# Patient Record
Sex: MALE | Race: WHITE | Employment: OTHER | URBAN - METROPOLITAN AREA
[De-identification: names, ages, dates, MRNs, and addresses within clinical notes are randomized per-mention and may not be internally consistent; named-entity substitution may affect disease eponyms.]

---

## 2018-06-13 ENCOUNTER — HOSPITAL ENCOUNTER (OUTPATIENT)
Dept: PREADMISSION TESTING | Age: 78
Discharge: HOME | End: 2018-06-13
Payer: MEDICARE

## 2018-06-13 VITALS — BODY MASS INDEX: 38.8 KG/M2 | HEIGHT: 70 IN | WEIGHT: 271 LBS

## 2018-06-13 LAB
ANION GAP SERPL CALC-SCNC: 10 MMOL/L (ref 3–18)
ATRIAL RATE: 61 BPM
BUN SERPL-MCNC: 26 MG/DL (ref 7–18)
BUN/CREAT SERPL: 13 (ref 12–20)
CALCIUM SERPL-MCNC: 8.5 MG/DL (ref 8.5–10.1)
CALCULATED P AXIS, ECG09: 59 DEGREES
CALCULATED R AXIS, ECG10: 67 DEGREES
CALCULATED T AXIS, ECG11: 44 DEGREES
CHLORIDE SERPL-SCNC: 110 MMOL/L (ref 100–108)
CO2 SERPL-SCNC: 24 MMOL/L (ref 21–32)
CREAT SERPL-MCNC: 2 MG/DL (ref 0.6–1.3)
DIAGNOSIS, 93000: NORMAL
GLUCOSE SERPL-MCNC: 88 MG/DL (ref 74–99)
HCT VFR BLD AUTO: 37.5 % (ref 36–48)
HGB BLD-MCNC: 12 G/DL (ref 13–16)
P-R INTERVAL, ECG05: 192 MS
POTASSIUM SERPL-SCNC: 4.5 MMOL/L (ref 3.5–5.5)
Q-T INTERVAL, ECG07: 440 MS
QRS DURATION, ECG06: 138 MS
QTC CALCULATION (BEZET), ECG08: 442 MS
SODIUM SERPL-SCNC: 144 MMOL/L (ref 136–145)
VENTRICULAR RATE, ECG03: 61 BPM

## 2018-06-13 PROCEDURE — 93005 ELECTROCARDIOGRAM TRACING: CPT

## 2018-06-13 PROCEDURE — 80048 BASIC METABOLIC PNL TOTAL CA: CPT | Performed by: UROLOGY

## 2018-06-13 PROCEDURE — 85018 HEMOGLOBIN: CPT | Performed by: UROLOGY

## 2018-06-13 RX ORDER — LEVOFLOXACIN 5 MG/ML
500 INJECTION, SOLUTION INTRAVENOUS ONCE
Status: CANCELLED | OUTPATIENT
Start: 2018-06-13 | End: 2018-06-13

## 2018-06-13 RX ORDER — SODIUM CHLORIDE, SODIUM LACTATE, POTASSIUM CHLORIDE, CALCIUM CHLORIDE 600; 310; 30; 20 MG/100ML; MG/100ML; MG/100ML; MG/100ML
125 INJECTION, SOLUTION INTRAVENOUS CONTINUOUS
Status: CANCELLED | OUTPATIENT
Start: 2018-06-13

## 2018-06-20 NOTE — H&P
A    Urology [address], .. [address], [address] [facility]  phone: [phone]  fax: [phone]          Patient: Zunilda Akers  YOB: 1940  Date: 06/05/2018 1:30 PM   Visit Type: Office Visit      Assessment/Plan  # Detail Type Description    1. Assessment Enlarged prostate w/ LUTS (N40.1), Symptomatic. Patient Plan Mr. Rohith Akbar has a hx of urinary difficulties he underwent a Greenlight laser of the prostate in 2015 and had done very well. We use 250,000 jewels at that time and removed a large bladder stone. His sx's have recurred I performed a cysto today he has regowth of some prostate tissue. A uroflow study revealed obstructive pattern with a peak flow of 10 ml per sec and a post void residual of over 200ml. I reviewed options with the pt I recommended he undergo a laser of the residual prostatic tissue and he agreed. All risks including pain, infection, bleeding, need for catheter were discussed. He understands and agrees. 2. Assessment Frequency of micturition (R35.0). 3. Assessment Urgency of urination (R39.15). 4. Assessment Urge incontinence (N39.41). 5. Assessment Nocturia (R35.1). This [de-identified]year old male presents for BPH. History of Present Illness:  1. BPH      Onset was gradual. It occurs intermittently. The problem is improving. Associated symptoms include nocturia (3 times per night), urgency and urinary frequency. Pertinent negatives include chills, constipation, fever, pressure and urinary incontinence. Additional information: Pt underwent Greenlight laser of Prostate 8/20/15 using 250k joules and laser removal 1.7cm bladder stone. He underwent BOTOX 9/15/16 which helped. He is here to undergo cystoscopy and uroflow. Nahomi Erickson                   PAST MEDICAL/SURGICAL HISTORY   (Reviewed, updated)    Disease/disorder Onset Date Management Date Comments   Crohn's disease  colonoscopy with biopsy 06/05/2013 Kidney Stone         Lumbar Fusion       hemorrhoidectomy       Prostate biopsy       excision of rectal polyps       lithotripsy       partial colectomy       Green light PVP 08/20/2015      DIAGNOSTICS HISTORY:  Test Ordered Interpretation Result completed   *Echocardiography Trans Complete  see detail EF 0.55 (55%). 01/30/2017   Echo/MUGA - Ejection Fraction 01/30/2017  EF 0.55 (55%) 02/20/2017     Test Ordered Ordering Comments Modifier   *Echocardiography Trans Complete  Mild aortic stenosis    Echo/MUGA - Ejection Fraction 01/30/2017         PROBLEM LIST:  Problem Description Onset Date   Benign essential hypertension 09/21/2012   Vitamin B deficiency 09/21/2012   Vitamin B deficiency 09/21/2012   Crohn's disease 09/21/2012   Vitamin B deficiency 09/21/2012   Vitamin B deficiency 09/21/2012   Benign prostatic hyperplasia 09/21/2012   Anemia 09/21/2012   Chronic renal impairment 09/21/2012     Medication Reconciliation  Medications reconciled today.   Medication Reviewed  Adherence Medication Name Sig Desc Elsewhere Status   taking as directed Fish Oil 1,000 mg capsule  N Verified   taking as directed iron  mg (45 mg iron) tablet,extended release  N Verified   taking as directed Vitamin D3 1,000 unit tablet one po daily Y Verified   taking as directed magnesium oxide 400 mg tablet take 1 tablet by oral route  every 3 days Y Verified   taking as directed Botox 100 unit injection inject by injection route 100 units of botox in 30ml of presertive free saline N Verified   taking as directed cholestyramine (with sugar) 4 gram oral powder TAKE 1 SCOOP BY ORAL ROUTE EVERY DAY DISSOLVED IN 2 TO 6 OUNCES OF WATER OR NONCARBONATED BEVERAGE N Verified   taking as directed METOPROLOL SUCCINATE ER 25 MG TABLET,EXTENDED RELEASE 24 HR TAKE 1.5 TABLET (37.5MG)  BY ORAL ROUTE  EVERY DAY N Verified   taking as directed DOXAZOSIN 4 MG TABLET TAKE 2 TABLETS EVERY DAY FOR ENLARGED PROSTATE WITH LOWER URINARY TRACT SYMPTOMS N Verified   taking as directed ALLOPURINOL 100 MG TABLET TAKE 1 TABLET BY ORAL ROUTE  EVERY DAY N Verified   taking as directed Clarinex 5 mg tablet take 1 tablet by oral route  every day N Verified   taking as directed CYANOCOBALAMIN (VIT B-12) 1,000 MCG/ML INJECTION SOLUTION INJECT 1 MILLILITER BY INTRAMUSCULAR ROUTE  EVERY 3 WEEKS N Verified     Allergies  Ingredient Reaction Medication Name Comment   NO KNOWN ALLERGIES        (Reviewed, no changes.)  Family History:  (Reviewed, updated)  Relationship Family Member Name  Age at Death Condition Onset Age Cause of Death   Father  Y       Father  Y  Cancer  Y   Mother  Y       Mother  Y  Coronary artery disease  Y         Social History:  (Reviewed, updated)  Tobacco use reviewed. Preferred language is Other/not Defined. MARITAL STATUS/FAMILY/SOCIAL SUPPORT  Currently . Tobacco use status: Occasional cigarette smoker. Smoking status: Former smoker. SMOKING STATUS  Use Status Type Smoking Status Usage Per Day Years Used Total Pack Years   yes Cigarette Former smoker          No passive smoke exposure. ALCOHOL  There is no history of alcohol use. consumed rarely. CAFFEINE  The patient uses caffeine: tea and soda. - 2 cups a day. LIFESTYLE  Sedentary activity level. Never exercises. DIET  high calorie. Restorationist/SPIRITUAL  Patient agrees to transfusion. HOME ENVIRONMENT/SAFETY  The home has smoke detectors. Carbon monoxide detector at home. Uses seat belts. Advance Directives:  STATUS  Last reviewed on:     DIRECTIVES  Transfusion: Patient agrees to transfusion. Review of Systems  System Neg/Pos Details   Constitutional Negative Chills and fever. ENMT Negative Ear infections and sore throat. Eyes Negative Blurred vision, double vision and eye pain. Respiratory Negative Asthma, chronic cough, dyspnea and wheezing. Cardio Negative Chest pain.    GI Negative Constipation, decreased appetite, diarrhea, nausea and vomiting.  Positive Nocturia, Urgency, Urinary frequency.  Negative Pressure and urinary incontinence. Endocrine Negative Cold intolerance, heat intolerance, increased thirst and weight loss. Neuro Negative Headache and tremors. Psych Negative Anxiety and depression. Integumentary Negative Itching skin and rash. MS Negative Back pain and joint pain. Hema/Lymph Negative Easy bleeding. Reproductive Negative Sexual dysfunction. Physical Exam  Exam Findings Details   Constitutional Normal Well developed. Neck Exam Normal Inspection - Normal.   Respiratory Normal Inspection - Normal.   Extremity Normal No edema. Neurological Normal Alert and oriented to person, place and time. Cranial nerves intact. No motor or sensory deficits. Psychiatric Normal Oriented to time, place, person and situation. Appropriate mood and affect. Procedures:  Cystoscopy indication:  Bladder. Patient consent:  Consent was obtained. The procedure and risks were explained in detail. Questions were encouraged and answered. The patient was prepped and draped in the usual sterile fashion. Procedure:  A diagnostic cystourethroscopy was performed using a 16 Danish flexible cystoscope  Anesthesia:  Lidocaine 1%  Lidocaine Jelly 2%  ativan 1ml  Patient position:  Supine. Patient response:  Patient tolerated procedure well. Patient was given instructions. Patient was discharged in stable condition. Findings:  Anterior urethra normal in appearance. Prostatic urethra bilobar prostatic enlargement with the median lobe present. Ureteral orifices normal in appearance. Antibiotics:  No antibiotics given. Impression:  Enlarged prostate w/ LUTS N40.1. Uroflow:  Feeling of incomplete bladder emptying R39.14. Consent was obtained. The procedure and risks were explained in detail. Questions were encouraged and answered. Patient was prepped and draped in the usual fashion. Procedure: Total volume: 291ml. Flow time: 54sec. Peak flow: 10ml. Void time: 82sec. Average flow: 5m/sec. Time to peak: 6sec. Sonographic residual urine: 218mL. Time after void: 2 Minutes. Comments:. Physician: Casandra Light MD. Date: 06/05/2018. Time: 2:01 PM.  Post procedure: Instructions: Jessee Sheffield Medications (added, continued, or stopped today)  Started Medication Directions PRN Status PRN Reason Instruction Stopped   12/15/2017 ALLOPURINOL 100 MG TABLET TAKE 1 TABLET BY ORAL ROUTE  EVERY DAY N      09/14/2016 Botox 100 unit injection inject by injection route 100 units of botox in 30ml of presertive free saline N      11/22/2017 cholestyramine (with sugar) 4 gram oral powder TAKE 1 SCOOP BY ORAL ROUTE EVERY DAY DISSOLVED IN 2 TO 6 OUNCES OF WATER OR NONCARBONATED BEVERAGE N      01/15/2018 Clarinex 5 mg tablet take 1 tablet by oral route  every day N      02/21/2018 CYANOCOBALAMIN (VIT B-12) 1,000 MCG/ML INJECTION SOLUTION INJECT 1 MILLILITER BY INTRAMUSCULAR ROUTE  EVERY 3 WEEKS N      12/15/2017 DOXAZOSIN 4 MG TABLET TAKE 2 TABLETS EVERY DAY FOR ENLARGED PROSTATE WITH LOWER URINARY TRACT SYMPTOMS N      06/09/2015 Fish Oil 1,000 mg capsule  N      12/21/2015 iron  mg (45 mg iron) tablet,extended release  N      06/07/2016 magnesium oxide 400 mg tablet take 1 tablet by oral route  every 3 days N      12/15/2017 METOPROLOL SUCCINATE ER 25 MG TABLET,EXTENDED RELEASE 24 HR TAKE 1.5 TABLET (37.5MG)  BY ORAL ROUTE  EVERY DAY N      02/10/2016 Vitamin D3 1,000 unit tablet one po daily N      Completed by:        Maxx Gonzales  06/09/2018 1:52 PM   Document generated by:  Linda Naranjo 06/09/2018 01:52 PM      -----------------------------------------------------------------------------------------------------------    Electronically signed by Casandra Light MD on 06/10/2018 09:33 PM

## 2018-06-21 ENCOUNTER — ANESTHESIA (OUTPATIENT)
Dept: SURGERY | Age: 78
End: 2018-06-21
Payer: MEDICARE

## 2018-06-21 ENCOUNTER — HOSPITAL ENCOUNTER (OUTPATIENT)
Age: 78
Setting detail: OUTPATIENT SURGERY
Discharge: HOME | End: 2018-06-21
Attending: UROLOGY | Admitting: UROLOGY
Payer: MEDICARE

## 2018-06-21 ENCOUNTER — ANESTHESIA EVENT (OUTPATIENT)
Dept: SURGERY | Age: 78
End: 2018-06-21
Payer: MEDICARE

## 2018-06-21 VITALS
SYSTOLIC BLOOD PRESSURE: 136 MMHG | TEMPERATURE: 98.6 F | DIASTOLIC BLOOD PRESSURE: 75 MMHG | HEART RATE: 68 BPM | RESPIRATION RATE: 16 BRPM | WEIGHT: 270 LBS | OXYGEN SATURATION: 98 % | HEIGHT: 71 IN | BODY MASS INDEX: 37.8 KG/M2

## 2018-06-21 PROCEDURE — 74011250636 HC RX REV CODE- 250/636: Performed by: UROLOGY

## 2018-06-21 PROCEDURE — 76210000006 HC OR PH I REC 0.5 TO 1 HR: Performed by: UROLOGY

## 2018-06-21 PROCEDURE — 77030018846 HC SOL IRR STRL H20 ICUM -A: Performed by: UROLOGY

## 2018-06-21 PROCEDURE — 74011250636 HC RX REV CODE- 250/636

## 2018-06-21 PROCEDURE — 76060000032 HC ANESTHESIA 0.5 TO 1 HR: Performed by: UROLOGY

## 2018-06-21 PROCEDURE — 77030020782 HC GWN BAIR PAWS FLX 3M -B: Performed by: UROLOGY

## 2018-06-21 PROCEDURE — 77030018836 HC SOL IRR NACL ICUM -A: Performed by: UROLOGY

## 2018-06-21 PROCEDURE — 74011000272 HC RX REV CODE- 272: Performed by: UROLOGY

## 2018-06-21 PROCEDURE — 76010000160 HC OR TIME 0.5 TO 1 HR INTENSV-TIER 1: Performed by: UROLOGY

## 2018-06-21 PROCEDURE — 74011250637 HC RX REV CODE- 250/637: Performed by: UROLOGY

## 2018-06-21 PROCEDURE — 74011000250 HC RX REV CODE- 250

## 2018-06-21 PROCEDURE — 76210000021 HC REC RM PH II 0.5 TO 1 HR: Performed by: UROLOGY

## 2018-06-21 PROCEDURE — 77030018831 HC SOL IRR H20 BAXT -A: Performed by: UROLOGY

## 2018-06-21 PROCEDURE — 77030012508 HC MSK AIRWY LMA AMBU -A: Performed by: ANESTHESIOLOGY

## 2018-06-21 PROCEDURE — 77030034696 HC CATH URETH FOL 2W BARD -A: Performed by: UROLOGY

## 2018-06-21 RX ORDER — ONDANSETRON 2 MG/ML
INJECTION INTRAMUSCULAR; INTRAVENOUS AS NEEDED
Status: DISCONTINUED | OUTPATIENT
Start: 2018-06-21 | End: 2018-06-21 | Stop reason: HOSPADM

## 2018-06-21 RX ORDER — INSULIN LISPRO 100 [IU]/ML
INJECTION, SOLUTION INTRAVENOUS; SUBCUTANEOUS ONCE
Status: DISCONTINUED | OUTPATIENT
Start: 2018-06-21 | End: 2018-06-21 | Stop reason: HOSPADM

## 2018-06-21 RX ORDER — PROPOFOL 10 MG/ML
INJECTION, EMULSION INTRAVENOUS AS NEEDED
Status: DISCONTINUED | OUTPATIENT
Start: 2018-06-21 | End: 2018-06-21 | Stop reason: HOSPADM

## 2018-06-21 RX ORDER — LEVOFLOXACIN 5 MG/ML
500 INJECTION, SOLUTION INTRAVENOUS ONCE
Status: COMPLETED | OUTPATIENT
Start: 2018-06-21 | End: 2018-06-21

## 2018-06-21 RX ORDER — SODIUM CHLORIDE 0.9 % (FLUSH) 0.9 %
5-10 SYRINGE (ML) INJECTION AS NEEDED
Status: DISCONTINUED | OUTPATIENT
Start: 2018-06-21 | End: 2018-06-21 | Stop reason: HOSPADM

## 2018-06-21 RX ORDER — ATROPA BELLADONNA AND OPIUM 16.2; 3 MG/1; MG/1
SUPPOSITORY RECTAL AS NEEDED
Status: DISCONTINUED | OUTPATIENT
Start: 2018-06-21 | End: 2018-06-21 | Stop reason: HOSPADM

## 2018-06-21 RX ORDER — DEXTROSE 50 % IN WATER (D50W) INTRAVENOUS SYRINGE
25-50 AS NEEDED
Status: DISCONTINUED | OUTPATIENT
Start: 2018-06-21 | End: 2018-06-21 | Stop reason: HOSPADM

## 2018-06-21 RX ORDER — FUROSEMIDE 10 MG/ML
INJECTION INTRAMUSCULAR; INTRAVENOUS AS NEEDED
Status: DISCONTINUED | OUTPATIENT
Start: 2018-06-21 | End: 2018-06-21 | Stop reason: HOSPADM

## 2018-06-21 RX ORDER — FLUMAZENIL 0.1 MG/ML
0.2 INJECTION INTRAVENOUS
Status: DISCONTINUED | OUTPATIENT
Start: 2018-06-21 | End: 2018-06-21 | Stop reason: HOSPADM

## 2018-06-21 RX ORDER — SODIUM CHLORIDE 0.9 % (FLUSH) 0.9 %
5-10 SYRINGE (ML) INJECTION EVERY 8 HOURS
Status: CANCELLED | OUTPATIENT
Start: 2018-06-21

## 2018-06-21 RX ORDER — FENTANYL CITRATE 50 UG/ML
50 INJECTION, SOLUTION INTRAMUSCULAR; INTRAVENOUS
Status: DISCONTINUED | OUTPATIENT
Start: 2018-06-21 | End: 2018-06-21 | Stop reason: HOSPADM

## 2018-06-21 RX ORDER — LIDOCAINE HYDROCHLORIDE 20 MG/ML
INJECTION, SOLUTION EPIDURAL; INFILTRATION; INTRACAUDAL; PERINEURAL AS NEEDED
Status: DISCONTINUED | OUTPATIENT
Start: 2018-06-21 | End: 2018-06-21 | Stop reason: HOSPADM

## 2018-06-21 RX ORDER — SODIUM CHLORIDE, SODIUM LACTATE, POTASSIUM CHLORIDE, CALCIUM CHLORIDE 600; 310; 30; 20 MG/100ML; MG/100ML; MG/100ML; MG/100ML
1000 INJECTION, SOLUTION INTRAVENOUS CONTINUOUS
Status: DISCONTINUED | OUTPATIENT
Start: 2018-06-21 | End: 2018-06-21 | Stop reason: HOSPADM

## 2018-06-21 RX ORDER — SODIUM CHLORIDE, SODIUM LACTATE, POTASSIUM CHLORIDE, CALCIUM CHLORIDE 600; 310; 30; 20 MG/100ML; MG/100ML; MG/100ML; MG/100ML
125 INJECTION, SOLUTION INTRAVENOUS CONTINUOUS
Status: DISCONTINUED | OUTPATIENT
Start: 2018-06-21 | End: 2018-06-21 | Stop reason: HOSPADM

## 2018-06-21 RX ORDER — FENTANYL CITRATE 50 UG/ML
INJECTION, SOLUTION INTRAMUSCULAR; INTRAVENOUS AS NEEDED
Status: DISCONTINUED | OUTPATIENT
Start: 2018-06-21 | End: 2018-06-21 | Stop reason: HOSPADM

## 2018-06-21 RX ORDER — MAGNESIUM SULFATE 100 %
4 CRYSTALS MISCELLANEOUS AS NEEDED
Status: DISCONTINUED | OUTPATIENT
Start: 2018-06-21 | End: 2018-06-21 | Stop reason: HOSPADM

## 2018-06-21 RX ORDER — SODIUM CHLORIDE 0.9 % (FLUSH) 0.9 %
5-10 SYRINGE (ML) INJECTION AS NEEDED
Status: CANCELLED | OUTPATIENT
Start: 2018-06-21

## 2018-06-21 RX ORDER — NALOXONE HYDROCHLORIDE 0.4 MG/ML
0.1 INJECTION, SOLUTION INTRAMUSCULAR; INTRAVENOUS; SUBCUTANEOUS AS NEEDED
Status: DISCONTINUED | OUTPATIENT
Start: 2018-06-21 | End: 2018-06-21 | Stop reason: HOSPADM

## 2018-06-21 RX ORDER — GLYCOPYRROLATE 0.2 MG/ML
INJECTION INTRAMUSCULAR; INTRAVENOUS AS NEEDED
Status: DISCONTINUED | OUTPATIENT
Start: 2018-06-21 | End: 2018-06-21 | Stop reason: HOSPADM

## 2018-06-21 RX ADMIN — LEVOFLOXACIN 500 MG: 5 INJECTION, SOLUTION INTRAVENOUS at 13:40

## 2018-06-21 RX ADMIN — LIDOCAINE HYDROCHLORIDE 80 MG: 20 INJECTION, SOLUTION EPIDURAL; INFILTRATION; INTRACAUDAL; PERINEURAL at 13:46

## 2018-06-21 RX ADMIN — GLYCOPYRROLATE 0.2 MG: 0.2 INJECTION INTRAMUSCULAR; INTRAVENOUS at 13:37

## 2018-06-21 RX ADMIN — SODIUM CHLORIDE, SODIUM LACTATE, POTASSIUM CHLORIDE, AND CALCIUM CHLORIDE: 600; 310; 30; 20 INJECTION, SOLUTION INTRAVENOUS at 14:10

## 2018-06-21 RX ADMIN — FUROSEMIDE 10 MG: 10 INJECTION INTRAMUSCULAR; INTRAVENOUS at 14:03

## 2018-06-21 RX ADMIN — SODIUM CHLORIDE, SODIUM LACTATE, POTASSIUM CHLORIDE, AND CALCIUM CHLORIDE 125 ML/HR: 600; 310; 30; 20 INJECTION, SOLUTION INTRAVENOUS at 12:38

## 2018-06-21 RX ADMIN — PROPOFOL 200 MG: 10 INJECTION, EMULSION INTRAVENOUS at 13:46

## 2018-06-21 RX ADMIN — FENTANYL CITRATE 100 MCG: 50 INJECTION, SOLUTION INTRAMUSCULAR; INTRAVENOUS at 13:46

## 2018-06-21 RX ADMIN — ONDANSETRON 4 MG: 2 INJECTION INTRAMUSCULAR; INTRAVENOUS at 13:48

## 2018-06-21 NOTE — PERIOP NOTES
Pt left stable with Family . Folder with discharge instruction and prescription given. Arm band shredded. IV access discontinue. No further questions. Given instruction on how to empty alcocer. End of PeriOp care.

## 2018-06-21 NOTE — IP AVS SNAPSHOT
[address] [address] 
[phone] Patient: Perez Grove. MRN: [id number] BPQ:7/92/2006 About your hospitalization You were admitted on:  June 21, 2018 You last received care in the:  [facility] PHASE 2 RECOVERY You were discharged on:  June 21, 2018 Why you were hospitalized Your primary diagnosis was:  Not on File Follow-up Information Follow up With Details Comments Contact Info Talib Ellis MD   [address] [facility] [address] [id number] 
[phone] Lyric Shirley MD Call in 1 day(s)  111 [facility] 107 [address]00 [facility] 
[phone] Discharge Orders None A check rodrigo indicates which time of day the medication should be taken. My Medications CONTINUE taking these medications Instructions Each Dose to Equal  
 Morning Noon Evening Bedtime  
 allopurinol 100 mg tablet Commonly known as:  Almaz Horowitz Your last dose was: Your next dose is: Take 100 mg by mouth nightly. Indications: GOUT  
 100 mg  
    
   
   
   
  
 cholestyramine-aspartame 4 gram packet Commonly known as:  Zane Sicard Your last dose was: Your next dose is: Take 4 g by mouth once. 4 g  
    
   
   
   
  
 cyanocobalamin 1,000 mcg/mL injection Commonly known as:  VITAMIN B12 Your last dose was: Your next dose is:    
   
   
 1,000 mcg by IntraMUSCular route every month. 1000 mcg  
    
   
   
   
  
 desloratadine 5 mg tablet Commonly known as:  CLARINEX Your last dose was: Your next dose is: Take 5 mg by mouth nightly. Indications: ALLERGIC RHINITIS  
 5 mg  
    
   
   
   
  
 doxazosin 4 mg tablet Commonly known as:  CARDURA Your last dose was: Your next dose is: Take 4 mg by mouth nightly. Indications: HYPERTENSION  
 4 mg FISH OIL 1,000 mg Cap Generic drug:  omega-3 fatty acids-vitamin e Your last dose was: Your next dose is: Take 1 Cap by mouth daily. Pt. Instructed to stop herbal medications 7 days prior. 1 Cap Iron 325 mg (65 mg iron) tablet Generic drug:  ferrous sulfate Your last dose was: Your next dose is: Take  by mouth Daily (before breakfast). magnesium oxide 400 mg tablet Commonly known as:  MAG-OX Your last dose was: Your next dose is: Take 400 mg by mouth nightly. 400 mg METOPROLOL SUCCINATE PO Your last dose was: Your next dose is: Take 37.5 mg by mouth every evening. 37.5 mg  
    
   
   
   
  
 VITAMIN D3 1,000 unit Cap Generic drug:  cholecalciferol Your last dose was: Your next dose is: Take 1,000 Units by mouth nightly. Indications: VITAMIN D DEFICIENCY  
 1000 Units Discharge Instructions Arely Bhat. Phoenix Juarez M.D. [place] [address] [address] Office: [phone] Fax:    [phone] PROCEDURE: Procedure(s): 
GREENLIGHT LASER OF PROSTATE RESIDUAL TISSUE **SPEC POP** Notify [facility] IMMEDIATELY if any of the following occur: ? You are unable to urinate. Urgency to urinate is not uncommon. ? You find yourself urinating small frequent amounts associated with severe lower abdominal discomfort. ? Bright red blood with clots in the urine. Some reddish urine is not uncommon and should be treated with increasing the amount of fluids you drink. ? Temperature above 101.5° and / or chills. ? You are nauseous and / or vomiting and you cannot hold down any fluids. ? Your pain is not controlled with the pain medication prescribed. Special Considerations: ? Do not drive for at least 24 hours after the procedure and until you are no longer taking narcotic pain medication and you are able to move and react without hesitation. MEDICATIONS: 
Pain   []  Norco®   []  Percocet® []  Dilaudid®    [x]  Tramadol Antibiotics   []  Cipro   []  Keflex    [x] Levaquin   []  Bactrim DS® Urination   []  Vesicare®   []  Flomax Burning   []  Pyridium®   []  Wolfgang Stalls Nausea   []  Zofran®   []  Phenergan® Miscellaneous   []        
 
[] Prescriptions Written on Chart 
 
[] Prescriptions sent Electronically Our office will call you tomorrow to schedule your first follow-up appointment. Please contact [place] [address] Urology at [phone] or go to the nearest Emergency Department / Urgent Care facility for any other medical questions or concerns. DISCHARGE SUMMARY from Nurse PATIENT INSTRUCTIONS: 
 
 
F-face looks uneven A-arms unable to move or move unevenly S-speech slurred or non-existent T-time-call 911 as soon as signs and symptoms begin-DO NOT go Back to bed or wait to see if you get better-TIME IS BRAIN. Warning Signs of HEART ATTACK Call 911 if you have these symptoms: 
? Chest discomfort. Most heart attacks involve discomfort in the center of the chest that lasts more than a few minutes, or that goes away and comes back. It can feel like uncomfortable pressure, squeezing, fullness, or pain. ? Discomfort in other areas of the upper body. Symptoms can include pain or discomfort in one or both arms, the back, neck, jaw, or stomach. ? Shortness of breath with or without chest discomfort. ? Other signs may include breaking out in a cold sweat, nausea, or lightheadedness. Don't wait more than five minutes to call [address]! Fast action can save your life. Calling 911 is almost always the fastest way to get lifesaving treatment. Emergency Medical Services staff can begin treatment when they arrive  up to an hour sooner than if someone gets to the hospital by car. The discharge information has been reviewed with the patient and caregiver. The patient and caregiver verbalized understanding. Discharge medications reviewed with the patient and caregiver and appropriate educational materials and side effects teaching were provided. ____________________________________ Patient armband removed and shredded_______________________________________________________________________________________________ Introducing [facility] & HEALTH SERVICES! [employer] introduces [facility] medical patient portal. Now you can access parts of your medical record, email your doctor's office, and request medication refills online. 1. In your internet browser, go to [email or web address] 2. Click on the First Time User? Click Here link in the Sign In box. You will see the New Member Sign Up page. 3. Enter your [facility] medical Access Code exactly as it appears below. You will not need to use this code after youve completed the sign-up process. If you do not sign up before the expiration date, you must request a new code. · [facility] medical Access Code: [email or web address] Expires: 9/5/2018  5:02 PM 
 
4. Enter the last four digits of your Social Security Number (xxxx) and Date of Birth (mm/dd/yyyy) as indicated and click Submit. You will be taken to the next sign-up page. 5. Create a [employer] ID. This will be your MyChart login ID and cannot be changed, so think of one that is secure and easy to remember. 6. Create a [facility] medical password. You can change your password at any time. 7. Enter your Password Reset Question and Answer. This can be used at a later time if you forget your password. 8. Enter your e-mail address. You will receive e-mail notification when new information is available in [address] 9. Click Sign Up. You can now view and download portions of your medical record. 10. Click the Download Summary menu link to download a portable copy of your medical information. If you have questions, please visit the Frequently Asked Questions section of the [employer] website. Remember, [employer] is NOT to be used for urgent needs. For medical emergencies, dial 911. Now available from your iPhone and Android! Introducing Gus Otto As a [facility] patient, I wanted to make you aware of our electronic visit tool called Gus Danieljuan manuelyamileth. [facility] 24/7 allows you to connect within minutes with a medical provider 24 hours a day, seven days a week via a mobile device or tablet or logging into a secure website from your computer. You can access Gus Farrfin from anywhere in the [place]. A virtual visit might be right for you when you have a simple condition and feel like you just dont want to get out of bed, or cant get away from work for an appointment, when your regular [facility] provider is not available (evenings, weekends or holidays), or when youre out of town and need minor care. Electronic visits cost only $49 and if the [facility] [employer]/7 provider determines a prescription is needed to treat your condition, one can be electronically transmitted to a nearby pharmacy*. Please take a moment to enroll today if you have not already done so. The enrollment process is free and takes just a few minutes. To enroll, please download the [facility] 24/IQcard nida to your tablet or phone, or visit [email or web address] to enroll on your computer.    
And, as an [address] [place] [address] patient with a [employer] account, the results of your visits will be scanned into your electronic medical record and your primary care provider will be able to view the scanned results. We urge you to continue to see your regular [facility] provider for your ongoing medical care. And while your primary care provider may not be the one available when you seek a Gus Farrfin virtual visit, the peace of mind you get from getting a real diagnosis real time can be priceless. For more information on Gus 2Duchejuan manuelfin, view our Frequently Asked Questions (FAQs) at [email or web address]. Sincerely, 
 
Maura Betancourt MD 
Chief Medical Officer [address] *:  certain medications cannot be prescribed via [employer] Providers Seen During Your Hospitalization Provider Specialty Primary office phone Yomaira Rene MD Urology [phone] Your Primary Care Physician (PCP) Primary Care Physician Office Phone Office Fax Serjio [place] [phone] You are allergic to the following No active allergies Recent Documentation Height Weight BMI Smoking Status 1.803 m 122.5 kg 37.66 kg/m2 Former Smoker Emergency Contacts Name Discharge Info Relation Home Work Mobile Valencia Perez [facility] DISCHARGE CAREGIVER [3] Spouse [3] [phone] Patient Belongings The following personal items are in your possession at time of discharge: 
  Dental Appliances: None         Home Medications: None   Jewelry: None  Clothing: Pants, Shirt, Socks, Footwear, Undergarments (and cane in locker #9)    Other Valuables: None Please provide this summary of care documentation to your next provider. Signatures-by signing, you are acknowledging that this After Visit Summary has been reviewed with you and you have received a copy.   
  
 
  
    
    
 Patient Signature: ____________________________________________________________ Date:  ____________________________________________________________  
  
Claudene Born Provider Signature:  ____________________________________________________________ Date:  ____________________________________________________________

## 2018-06-21 NOTE — INTERVAL H&P NOTE
H&P Update:  Adolfo Maxwell. was seen and examined. History and physical has been reviewed. The patient has been examined.  There have been no significant clinical changes since the completion of the originally dated History and Physical.    Signed By: Shashank Willis MD     June 21, 2018 12:18 PM

## 2018-06-21 NOTE — ANESTHESIA PREPROCEDURE EVALUATION
Anesthetic History   No history of anesthetic complications            Review of Systems / Medical History  Patient summary reviewed, nursing notes reviewed and pertinent labs reviewed    Pulmonary  Within defined limits                 Neuro/Psych   Within defined limits           Cardiovascular    Hypertension: well controlled              Exercise tolerance: >4 METS     GI/Hepatic/Renal  Within defined limits              Endo/Other  Within defined limits           Other Findings            Physical Exam    Airway  Mallampati: II  TM Distance: 4 - 6 cm  Neck ROM: normal range of motion   Mouth opening: Normal     Cardiovascular    Rhythm: regular  Rate: normal         Dental      Comments: Missing lower side teeth   Pulmonary  Breath sounds clear to auscultation               Abdominal  GI exam deferred       Other Findings            Anesthetic Plan    ASA: 2  Anesthesia type: general          Induction: Intravenous  Anesthetic plan and risks discussed with: Patient

## 2018-06-21 NOTE — PERIOP NOTES
TRANSFER - IN REPORT:    Verbal report received from ORN on [facility].  being received from OR for routine post - op      Report consisted of patients Situation, Background, Assessment and   Recommendations(SBAR). Information from the following report(s) SBAR was reviewed with the receiving nurse. Opportunity for questions and clarification was provided. Assessment completed upon patients arrival to unit and care assumed.

## 2018-06-21 NOTE — PERIOP NOTES
Reviewed PTA medication list with patient/caregiver and patient/caregiver denies any additional medications. Patient admits to having a responsible adult care for them for at least 24 hours after surgery.     Dual skin assessment completed by Cinthya Jacobson RN and JORDON  [employer]

## 2018-06-21 NOTE — OP NOTES
OPERATIVE NOTE - GREEN LIGHT    Patient: Tasneem Trevino MRN: [id number]  SSN: [id number]    YOB: 1940  Age: [de-identified] y.o. Sex: male      Date of Procedure:  6/21/2018   Preoperative Diagnosis:  BENIGN PROSTATIC HYPERTROPHY W/OBSTRUCTION  Postoperative Diagnosis:  BENIGN PROSTATIC HYPERTROPHY W/OBSTRUCTION    Procedure:  Procedure(s):  GREENLIGHT LASER OF PROSTATE RESIDUAL TISSUE **SPEC POP**  Surgeon:  Surgeon(s) and Role:     * Eduardo Vivas MD - Primary  Anesthesia:  General   Estimated Blood Loss:  Minimal  Specimens:  * No specimens in log *   Implants:  * No implants in log *    Findings:  Regrowth of prostatic tissue from previous Greenlight laser surgery 1610  Complications:  none    Procedure Details:    After informed consent was obtained, the patient was taken to the operating room, and he underwent laryngeal mask anesthesia in the supine position. He was then prepped and draped in the usual surgical fashion after being placed in the dorsal lithotomy position. A 23-Armenian laser cystoscope was inserted with visual obturator per urethra into the bladder. The ureteral orifices were identified. The verumontanum was identified. Next, the visual obturator was exchanged for a laser guide. The greenlight laser fiber was inserted through the laser guide. Lasering was begun at the bladder neck from 5 o'clock to the 7 o'clock position. Once the bladder neck was taken down, my attention then turned towards the right lobe from the bladder neck towards the verumontanum. The left lobe was taken down in a similar fashion. The remainder of the excess prostatic adenoma was removed with the laser. The bladder was redrained, refilled at low-volume. There was no active bleeding. 10 ml of Lasix was given IV to promote post op diuresis. The procedure ended at this point. [id number] joules were used. Both ureteral orifices and the verumontanum were intact at the end of the procedure. The bladder was filled.  The laser was placed on standby and the cystoscope was removed. A 22-Slovenian, 5 mL two-way Gillis catheter was inserted per urethra into the bladder draining clear urine. Ten mL of sterile water was placed in the balloon and the catheter was connected to a leg bag. The patient was then washed off, dried, and placed in the supine position. He was awakened from anesthesia and then transferred to the post anaesthesia care unit.            Eduardo Vivas MD  6/21/2018  2:15 PM

## 2018-06-21 NOTE — ANESTHESIA POSTPROCEDURE EVALUATION
Post-Anesthesia Evaluation & Assessment    Visit Vitals    /65    Pulse (!) 56    Temp 36.6 °C (97.8 °F)    Resp 16    Ht 5' 11\" (1.803 m)    Wt 122.5 kg (270 lb)    SpO2 97%    BMI 37.66 kg/m2       No untreated/active PONV    Post-operative hydration adequate. Adequate post-operative analgesia per PACU discharge criteria    Mental status & level of consciousness: alert and oriented x 3    Respiratory status: patent unassisted airway     No apparent anesthetic complications requiring additional post-anesthetic care    Patient has met all discharge requirements.             Leah Lesch, MD

## 2018-06-21 NOTE — DISCHARGE INSTRUCTIONS
Gary Lo. Paulina Cordero M.D. [facility]  [address], [address] [address], [address] [facility]  Office: [phone]  Fax:    [phone]    PROCEDURE: Procedure(s):  GREENLIGHT LASER OF PROSTATE RESIDUAL TISSUE **SPEC POP**    Notify [facility] IMMEDIATELY if any of the following occur:     You are unable to urinate. Urgency to urinate is not uncommon.  You find yourself urinating small frequent amounts associated with severe lower abdominal discomfort.  Bright red blood with clots in the urine. Some reddish urine is not uncommon and should be treated with increasing the amount of fluids you drink.  Temperature above 101.5° and / or chills.  You are nauseous and / or vomiting and you cannot hold down any fluids.  Your pain is not controlled with the pain medication prescribed. Special Considerations:      Do not drive for at least 24 hours after the procedure and until you are no longer taking narcotic pain medication and you are able to move and react without hesitation. MEDICATIONS:  Pain   []  Norco®   []  Percocet® []  Dilaudid®    [x]  Tramadol   Antibiotics   []  Cipro   []  Keflex    [x] Levaquin   []  Bactrim DS®       Urination   []  Vesicare®   []  Flomax     Burning   []  Pyridium®   []  UribelTM     Nausea   []  Zofran®   []  Phenergan®     Miscellaneous   []           [] Prescriptions Written on Chart    [] Prescriptions sent Electronically           Our office will call you tomorrow to schedule your first follow-up appointment. Please contact [place] [address] [facility] at [phone] or go to the nearest Emergency Department / Urgent Care facility for any other medical questions or concerns.       DISCHARGE SUMMARY from Nurse    PATIENT INSTRUCTIONS:    After general anesthesia or intravenous sedation, for 24 hours or while taking prescription Narcotics:  · Limit your activities  · Do not drive and operate hazardous machinery  · Do not make important personal or business decisions  · Do  not drink alcoholic beverages  · If you have not urinated within 8 hours after discharge, please contact your surgeon on call. Report the following to your surgeon:  · Excessive pain, swelling, redness or odor of or around the surgical area  · Temperature over 100.5  · Nausea and vomiting lasting longer than 4 hours or if unable to take medications  · Any signs of decreased circulation or nerve impairment to extremity: change in color, persistent  numbness, tingling, coldness or increase pain  · Any questions    What to do at Home:  Recommended activity: Ambulate in house,     If you experience any of the following symptoms Chills, Fever, Nausea & Vomiting, pain that is not relieved by Pain medications, excessive blood in the urine please follow up with Dr. Pushpa Jain. *  Please give a list of your current medications to your Primary Care Provider. *  Please update this list whenever your medications are discontinued, doses are      changed, or new medications (including over-the-counter products) are added. *  Please carry medication information at all times in case of emergency situations. These are general instructions for a healthy lifestyle:    No smoking/ No tobacco products/ Avoid exposure to second hand smoke  Surgeon General's Warning:  Quitting smoking now greatly reduces serious risk to your health. Obesity, smoking, and sedentary lifestyle greatly increases your risk for illness    A healthy diet, regular physical exercise & weight monitoring are important for maintaining a healthy lifestyle    You may be retaining fluid if you have a history of heart failure or if you experience any of the following symptoms:  Weight gain of 3 pounds or more overnight or 5 pounds in a week, increased swelling in our hands or feet or shortness of breath while lying flat in bed.   Please call your doctor as soon as you notice any of these symptoms; do not wait until your next office visit. Recognize signs and symptoms of STROKE:    F-face looks uneven    A-arms unable to move or move unevenly    S-speech slurred or non-existent    T-time-call 911 as soon as signs and symptoms begin-DO NOT go       Back to bed or wait to see if you get better-TIME IS BRAIN. Warning Signs of HEART ATTACK     Call 911 if you have these symptoms:   Chest discomfort. Most heart attacks involve discomfort in the center of the chest that lasts more than a few minutes, or that goes away and comes back. It can feel like uncomfortable pressure, squeezing, fullness, or pain.  Discomfort in other areas of the upper body. Symptoms can include pain or discomfort in one or both arms, the back, neck, jaw, or stomach.  Shortness of breath with or without chest discomfort.  Other signs may include breaking out in a cold sweat, nausea, or lightheadedness. Don't wait more than five minutes to call 911 - MINUTES MATTER! Fast action can save your life. Calling 911 is almost always the fastest way to get lifesaving treatment. Emergency Medical Services staff can begin treatment when they arrive -- up to an hour sooner than if someone gets to the hospital by car. The discharge information has been reviewed with the patient and caregiver. The patient and caregiver verbalized understanding. Discharge medications reviewed with the patient and caregiver and appropriate educational materials and side effects teaching were provided.   ____________________________________  Patient armband removed and shredded_______________________________________________________________________________________________

## 2022-01-01 ENCOUNTER — HOSPICE ADMISSION (OUTPATIENT)
Dept: HOSPICE | Facility: HOSPICE | Age: 82
End: 2022-01-01
Payer: MEDICARE

## 2022-01-01 ENCOUNTER — HOME CARE VISIT (OUTPATIENT)
Dept: SCHEDULING | Facility: HOME HEALTH | Age: 82
End: 2022-01-01
Payer: MEDICARE

## 2022-01-01 VITALS
HEART RATE: 83 BPM | RESPIRATION RATE: 20 BRPM | DIASTOLIC BLOOD PRESSURE: 86 MMHG | TEMPERATURE: 97.2 F | SYSTOLIC BLOOD PRESSURE: 135 MMHG

## 2022-01-01 PROCEDURE — 0651 HSPC ROUTINE HOME CARE

## 2022-01-01 PROCEDURE — G0299 HHS/HOSPICE OF RN EA 15 MIN: HCPCS

## 2022-01-01 PROCEDURE — 3331090004 HSPC SERVICE INTENSITY ADD-ON

## 2022-09-06 ENCOUNTER — APPOINTMENT (OUTPATIENT)
Dept: GENERAL RADIOLOGY | Age: 82
End: 2022-09-06
Attending: EMERGENCY MEDICINE
Payer: MEDICARE

## 2022-09-06 ENCOUNTER — HOSPITAL ENCOUNTER (EMERGENCY)
Age: 82
Discharge: HOME | End: 2022-09-06
Attending: EMERGENCY MEDICINE
Payer: MEDICARE

## 2022-09-06 VITALS
DIASTOLIC BLOOD PRESSURE: 76 MMHG | WEIGHT: 250 LBS | OXYGEN SATURATION: 96 % | HEIGHT: 72 IN | SYSTOLIC BLOOD PRESSURE: 172 MMHG | BODY MASS INDEX: 33.86 KG/M2 | RESPIRATION RATE: 13 BRPM | HEART RATE: 74 BPM | TEMPERATURE: 98.5 F

## 2022-09-06 DIAGNOSIS — N18.30 STAGE 3 CHRONIC KIDNEY DISEASE, UNSPECIFIED WHETHER STAGE 3A OR 3B CKD (HCC): ICD-10-CM

## 2022-09-06 DIAGNOSIS — R60.0 PEDAL EDEMA: Primary | ICD-10-CM

## 2022-09-06 DIAGNOSIS — J81.0 ACUTE PULMONARY EDEMA (HCC): ICD-10-CM

## 2022-09-06 LAB
ALBUMIN SERPL-MCNC: 2.7 G/DL (ref 3.4–5)
ALBUMIN/GLOB SERPL: 0.7 {RATIO} (ref 0.8–1.7)
ALP SERPL-CCNC: 88 U/L (ref 45–117)
ALT SERPL-CCNC: 25 U/L (ref 16–61)
ANION GAP SERPL CALC-SCNC: 6 MMOL/L (ref 3–18)
APPEARANCE UR: CLEAR
APTT PPP: 40.5 SEC (ref 23–36.4)
AST SERPL-CCNC: 22 U/L (ref 10–38)
ATRIAL RATE: 73 BPM
BACTERIA URNS QL MICRO: ABNORMAL /HPF
BASOPHILS # BLD: 0 K/UL (ref 0–0.1)
BASOPHILS NFR BLD: 1 % (ref 0–2)
BILIRUB SERPL-MCNC: 1 MG/DL (ref 0.2–1)
BILIRUB UR QL: NEGATIVE
BNP SERPL-MCNC: 1311 PG/ML (ref 0–1800)
BUN SERPL-MCNC: 28 MG/DL (ref 7–18)
BUN/CREAT SERPL: 14 (ref 12–20)
CALCIUM SERPL-MCNC: 8.6 MG/DL (ref 8.5–10.1)
CALCULATED R AXIS, ECG10: 34 DEGREES
CALCULATED T AXIS, ECG11: 6 DEGREES
CHLORIDE SERPL-SCNC: 116 MMOL/L (ref 100–111)
CO2 SERPL-SCNC: 22 MMOL/L (ref 21–32)
COLOR UR: YELLOW
CREAT SERPL-MCNC: 2.02 MG/DL (ref 0.6–1.3)
DIAGNOSIS, 93000: NORMAL
DIFFERENTIAL METHOD BLD: ABNORMAL
EOSINOPHIL # BLD: 0.1 K/UL (ref 0–0.4)
EOSINOPHIL NFR BLD: 5 % (ref 0–5)
EPITH CASTS URNS QL MICRO: ABNORMAL /LPF (ref 0–5)
ERYTHROCYTE [DISTWIDTH] IN BLOOD BY AUTOMATED COUNT: 12.7 % (ref 11.6–14.5)
GLOBULIN SER CALC-MCNC: 3.9 G/DL (ref 2–4)
GLUCOSE SERPL-MCNC: 109 MG/DL (ref 74–99)
GLUCOSE UR STRIP.AUTO-MCNC: NEGATIVE MG/DL
HCT VFR BLD AUTO: 34.3 % (ref 36–48)
HGB BLD-MCNC: 10.8 G/DL (ref 13–16)
HGB UR QL STRIP: ABNORMAL
IMM GRANULOCYTES # BLD AUTO: 0 K/UL (ref 0–0.04)
IMM GRANULOCYTES NFR BLD AUTO: 0 % (ref 0–0.5)
INR PPP: 1.5 (ref 0.8–1.2)
KETONES UR QL STRIP.AUTO: NEGATIVE MG/DL
LEUKOCYTE ESTERASE UR QL STRIP.AUTO: ABNORMAL
LYMPHOCYTES # BLD: 0.5 K/UL (ref 0.9–3.6)
LYMPHOCYTES NFR BLD: 21 % (ref 21–52)
MCH RBC QN AUTO: 33.5 PG (ref 24–34)
MCHC RBC AUTO-ENTMCNC: 31.5 G/DL (ref 31–37)
MCV RBC AUTO: 106.5 FL (ref 78–100)
MONOCYTES # BLD: 0.2 K/UL (ref 0.05–1.2)
MONOCYTES NFR BLD: 9 % (ref 3–10)
NEUTS SEG # BLD: 1.4 K/UL (ref 1.8–8)
NEUTS SEG NFR BLD: 64 % (ref 40–73)
NITRITE UR QL STRIP.AUTO: NEGATIVE
NRBC # BLD: 0 K/UL (ref 0–0.01)
NRBC BLD-RTO: 0 PER 100 WBC
PH UR STRIP: 5.5 [PH] (ref 5–8)
PLATELET # BLD AUTO: 48 K/UL (ref 135–420)
PLATELET COMMENTS,PCOM: ABNORMAL
PMV BLD AUTO: 11.5 FL (ref 9.2–11.8)
POTASSIUM SERPL-SCNC: 3.9 MMOL/L (ref 3.5–5.5)
PROT SERPL-MCNC: 6.6 G/DL (ref 6.4–8.2)
PROT UR STRIP-MCNC: NEGATIVE MG/DL
PROTHROMBIN TIME: 18.4 SEC (ref 11.5–15.2)
Q-T INTERVAL, ECG07: 450 MS
QRS DURATION, ECG06: 138 MS
QTC CALCULATION (BEZET), ECG08: 492 MS
RBC # BLD AUTO: 3.22 M/UL (ref 4.35–5.65)
RBC #/AREA URNS HPF: ABNORMAL /HPF (ref 0–5)
RBC MORPH BLD: ABNORMAL
SODIUM SERPL-SCNC: 144 MMOL/L (ref 136–145)
SP GR UR REFRACTOMETRY: 1.01 (ref 1–1.03)
TROPONIN-HIGH SENSITIVITY: 23 NG/L (ref 0–78)
UROBILINOGEN UR QL STRIP.AUTO: 0.2 EU/DL (ref 0.2–1)
VENTRICULAR RATE, ECG03: 72 BPM
WBC # BLD AUTO: 2.2 K/UL (ref 4.6–13.2)
WBC URNS QL MICRO: ABNORMAL /HPF (ref 0–5)

## 2022-09-06 PROCEDURE — 93005 ELECTROCARDIOGRAM TRACING: CPT

## 2022-09-06 PROCEDURE — 85610 PROTHROMBIN TIME: CPT

## 2022-09-06 PROCEDURE — 99285 EMERGENCY DEPT VISIT HI MDM: CPT

## 2022-09-06 PROCEDURE — 96365 THER/PROPH/DIAG IV INF INIT: CPT

## 2022-09-06 PROCEDURE — 80053 COMPREHEN METABOLIC PANEL: CPT

## 2022-09-06 PROCEDURE — 74011250636 HC RX REV CODE- 250/636: Performed by: EMERGENCY MEDICINE

## 2022-09-06 PROCEDURE — 81001 URINALYSIS AUTO W/SCOPE: CPT

## 2022-09-06 PROCEDURE — P9047 ALBUMIN (HUMAN), 25%, 50ML: HCPCS | Performed by: EMERGENCY MEDICINE

## 2022-09-06 PROCEDURE — 94762 N-INVAS EAR/PLS OXIMTRY CONT: CPT

## 2022-09-06 PROCEDURE — 85025 COMPLETE CBC W/AUTO DIFF WBC: CPT

## 2022-09-06 PROCEDURE — 83880 ASSAY OF NATRIURETIC PEPTIDE: CPT

## 2022-09-06 PROCEDURE — 84484 ASSAY OF TROPONIN QUANT: CPT

## 2022-09-06 PROCEDURE — 71046 X-RAY EXAM CHEST 2 VIEWS: CPT

## 2022-09-06 PROCEDURE — 85730 THROMBOPLASTIN TIME PARTIAL: CPT

## 2022-09-06 PROCEDURE — 74011250637 HC RX REV CODE- 250/637: Performed by: EMERGENCY MEDICINE

## 2022-09-06 PROCEDURE — 96375 TX/PRO/DX INJ NEW DRUG ADDON: CPT

## 2022-09-06 RX ORDER — FUROSEMIDE 10 MG/ML
80 INJECTION INTRAMUSCULAR; INTRAVENOUS
Status: COMPLETED | OUTPATIENT
Start: 2022-09-06 | End: 2022-09-06

## 2022-09-06 RX ORDER — TAMSULOSIN HYDROCHLORIDE 0.4 MG/1
0.4 CAPSULE ORAL
Status: COMPLETED | OUTPATIENT
Start: 2022-09-06 | End: 2022-09-06

## 2022-09-06 RX ORDER — BUMETANIDE 1 MG/1
2 TABLET ORAL 2 TIMES DAILY
Qty: 120 TABLET | Refills: 0 | Status: ON HOLD | OUTPATIENT
Start: 2022-09-06 | End: 2022-10-07

## 2022-09-06 RX ORDER — DOXAZOSIN 4 MG/1
4 TABLET ORAL
Qty: 20 TABLET | Refills: 0 | Status: SHIPPED | OUTPATIENT
Start: 2022-09-06

## 2022-09-06 RX ORDER — BUMETANIDE 1 MG/1
1 TABLET ORAL 2 TIMES DAILY
COMMUNITY
End: 2022-09-06

## 2022-09-06 RX ORDER — ALBUMIN HUMAN 250 G/1000ML
12.5 SOLUTION INTRAVENOUS ONCE
Status: COMPLETED | OUTPATIENT
Start: 2022-09-06 | End: 2022-09-06

## 2022-09-06 RX ADMIN — TAMSULOSIN HYDROCHLORIDE 0.4 MG: 0.4 CAPSULE ORAL at 21:26

## 2022-09-06 RX ADMIN — FUROSEMIDE 80 MG: 10 INJECTION, SOLUTION INTRAMUSCULAR; INTRAVENOUS at 19:43

## 2022-09-06 RX ADMIN — ALBUMIN (HUMAN) 12.5 G: 0.25 INJECTION, SOLUTION INTRAVENOUS at 21:25

## 2022-09-10 NOTE — ED PROVIDER NOTES
EMERGENCY DEPARTMENT HISTORY AND PHYSICAL EXAM    Date: 9/6/2022  Patient Name: Elma Torre. History of Presenting Illness     Chief Complaint   Patient presents with    Foot Swelling           Abdominal Pain         History Provided By: Patient    Additional History (Context):   9:15 PM  Elma Figueroa is a 78 y.o. male with PMHX of hypertension, Crohn's disease with chronic diarrhea, BPH, CKD 3, partial bowel resection who presents to the emergency department C/O of swelling to his feet and body. Patient denies any pain but has noticed a swelling to his findings. He has some dyspnea with exertion but this is extremely is worsening. He has no headache no chest pain or abdominal pain. H she e denies any changes in urinary output or urine cultures. Social History  Denies smoking drinking or drugs. She quit smoking many years ago. Family History  Positive for high blood pressure. PCP: Alicia Song MD    Current Outpatient Medications   Medication Sig Dispense Refill    bumetanide (BUMEX) 1 mg tablet Take 2 Tablets by mouth two (2) times a day for 30 days. 120 Tablet 0    doxazosin (CARDURA) 4 mg tablet Take 1 Tablet by mouth nightly. Indications: enlarged prostate with urination problem, high blood pressure 20 Tablet 0    allopurinol (ZYLOPRIM) 100 mg tablet Take 100 mg by mouth nightly. Indications: GOUT      magnesium oxide (MAG-OX) 400 mg tablet Take 400 mg by mouth nightly. cholecalciferol (VITAMIN D3) 1,000 unit cap Take 1,000 Units by mouth nightly. Indications: VITAMIN D DEFICIENCY      cyanocobalamin (VITAMIN B12) 1,000 mcg/mL injection 1,000 mcg by IntraMUSCular route every month. cholestyramine light (QUESTRAN LITE) 4 gram packet Take 4 g by mouth once. omega-3 fatty acids-vitamin e (FISH OIL) 1,000 mg cap Take 1 Cap by mouth daily. Pt. Instructed to stop herbal medications 7 days prior. desloratadine (CLARINEX) 5 mg tablet Take 5 mg by mouth nightly. Indications: ALLERGIC RHINITIS      METOPROLOL SUCCINATE PO Take 37.5 mg by mouth every evening. ferrous sulfate (IRON) 325 mg (65 mg iron) tablet Take  by mouth Daily (before breakfast). Past History     Past Medical History:  Past Medical History:   Diagnosis Date    Bladder calculus     Chronic kidney disease     H/O kidney stones    Crohn disease ([facility] [address]     Foot drop     left    Hypertension     Kidney calculi        Past Surgical History:  Past Surgical History:   Procedure Laterality Date    HX BACK SURGERY      3 back surgery nola and screw    HX CATARACT REMOVAL Bilateral     HX GI      bowel [id number]    HX GI      hemorrhoidectomy    HX UROLOGICAL      kidney stone removal       Family History:  No family history on file. Social History:  Social History     Tobacco Use    Smoking status: Former     Types: Cigarettes     Quit date: 2000     Years since quittin.0    Smokeless tobacco: Never   Substance Use Topics    Alcohol use: No    Drug use: No       Allergies:  No Known Allergies      Review of Systems   Review of Systems   Constitutional:  Positive for unexpected weight change (wgt gain). Respiratory:  Positive for shortness of breath. Cardiovascular:  Positive for leg swelling. All other systems reviewed and are negative. Physical Exam     Vitals:    22 2149 22 2150 22 2151 22 2152   BP:       Pulse: 75 74 73 74   Resp: 18 18 22 13   Temp:       SpO2: 97% 96% 96% 96%   Weight:       Height:         Physical Exam  Vitals and nursing note reviewed. Constitutional:       General: He is not in acute distress. Appearance: He is well-developed and overweight. He is not diaphoretic. HENT:      Head: Normocephalic and atraumatic. Eyes:      General: No scleral icterus. Extraocular Movements:      Right eye: Normal extraocular motion. Left eye: Normal extraocular motion.       Conjunctiva/sclera: Conjunctivae normal. Pupils: Pupils are equal, round, and reactive to light. Neck:      Trachea: No tracheal deviation. Cardiovascular:      Rate and Rhythm: Normal rate and regular rhythm. Heart sounds: Normal heart sounds. Pulmonary:      Effort: Pulmonary effort is normal. No respiratory distress. Breath sounds: Normal breath sounds. No stridor. Abdominal:      General: Bowel sounds are normal. There is no distension. Palpations: Abdomen is soft. Tenderness: There is no abdominal tenderness. There is no rebound. Musculoskeletal:         General: No tenderness. Normal range of motion. Cervical back: Normal range of motion and neck supple. Right lower le+ Pitting Edema present. Left lower le+ Pitting Edema present. Comments: Grossly unremarkable without abnormalities   Skin:     General: Skin is warm and dry. Capillary Refill: Capillary refill takes less than 2 seconds. Findings: No erythema or rash. Neurological:      Mental Status: He is alert and oriented to person, place, and time. GCS: GCS eye subscore is 4. GCS verbal subscore is 5. GCS motor subscore is 6. Cranial Nerves: No cranial nerve deficit. Motor: No weakness. Psychiatric:         Mood and Affect: Mood normal.         Behavior: Behavior normal.         Thought Content:  Thought content normal.         Judgment: Judgment normal.     Diagnostic Study Results     Labs -  Lab Results         Contains abnormal data URINALYSIS W/ RFLX MICROSCOPIC (Final result)   Component (Lab Inquiry)  Collection Time Result Time COLOR APPRN SPGRU FIDEL PROTU GLUCU KETU BILU BLDU UROU   22 20:56:00 22 21:27:27 YELLOW CLEAR 1.011 5.5 Negative Negative Negative Negative MODERATE Abnormal  0.2       Collection Time Result Time DONNELL LEUKU   22 20:56:00 22 21:27:27 Negative TRACE Abnormal          Final result                           Contains abnormal data URINE MICROSCOPIC ONLY (Final result)   Component (Lab Inquiry)  Collection Time Result Time WBCU RBCU EPSU BACTU   09/06/22 20:56:00 09/06/22 21:27:27 6 to 10 1 to 3 FEW 1+ Abnormal          Final result                           Contains abnormal data CBC WITH AUTOMATED DIFF (Final result)   Component (Lab Inquiry)  Collection Time Result Time WBC RBC HGB HCT MCV MCH MCHC RDW PLT MPLV   09/06/22 18:00:00 09/06/22 19:16:41 2.2 Low  3.22 Low  10.8 Low  34.3 Low  106.5 High  33.5 31.5 12.7 48 Low  11.5   Previous Results   06/13/18 09:45:00 06/13/18 12:25:41   12.0 Low  37.5         09/01/16 13:30:00 09/01/16 17:02:39   13.0 40.0         08/14/15 08:16:00 08/14/15 09:02:40   13.5 39.7         03/24/14 11:15:00 03/24/14 12:18:22   13.5 39.9         02/07/14 09:15:00 02/07/14 12:14:13   13.2 40.1             Collection Time Result Time NRBC ANRBC GRANS LYMPH MONOS EOS BASOS IG ABG ABL   09/06/22 18:00:00 09/06/22 19:16:41 0.0 0.00 64 21 9 5 1 0 1.4 Low  0.5 Low    Previous Results   06/13/18 09:45:00 06/13/18 12:25:41             09/01/16 13:30:00 09/01/16 17:02:39             08/14/15 08:16:00 08/14/15 09:02:40             03/24/14 11:15:00 03/24/14 12:18:22             02/07/14 09:15:00 02/07/14 12:14:13                 Collection Time Result Time ABM NERY ABB AIG DF PCOM RCOM   09/06/22 18:00:00 09/06/22 19:16:41 0.2 0.1 0.0 0.0 AUTOMATED DECREASED PLATELETS NORMOCYTIC, NORMOCHROMIC   Previous Results   06/13/18 09:45:00 06/13/18 12:25:41          09/01/16 13:30:00 09/01/16 17:02:39          08/14/15 08:16:00 08/14/15 09:02:40          03/24/14 11:15:00 03/24/14 12:18:22          02/07/14 09:15:00 02/07/14 12:14:13                Final result                           Contains abnormal data METABOLIC PANEL, COMPREHENSIVE (Final result)   Component (Lab Inquiry)  Collection Time Result Time NA K CL CO2 AGAP GLU BUN CREA BUCR GFRAA   09/06/22 18:00:00 09/06/22 18:51:55 144 3.9 116 High  22 6 109 High  28 High  2.02 High  14 38 Low    Previous Results   06/13/18 09:45:00 06/13/18 12:46:58 144 4.5 110 High  24 10 88 26 High  2.00 High  13 39 Low    09/01/16 13:30:00 09/01/16 17:06:08 144 5.5 108 25 11 128 High  28 High  2.04 High  14 38 Low    08/14/15 08:16:00 08/14/15 09:30:27 143 3.9 110 High  23 10 114 High  23 High  1.84 High  13 46 Low    03/24/14 11:15:00 03/24/14 12:31:39 142 4.5 106 28 8 87 23 High  1.69 High  14 51 Low    02/07/14 09:15:00 02/07/14 12:37:39 145 4.4 110 High  25 10 121 High  19 High  1.68 High  11 Low  51 Low        Collection Time Result Time GFRNA CA TBIL GPT SGOT AP TP ALB GLOB AGRAT   09/06/22 18:00:00 09/06/22 18:51:55 32   (NOTE)   Estimated GFR . .. Low  8.6 1.0 25 22 88 6.6 2.7 Low  3.9 0.7 Low    Previous Results   06/13/18 09:45:00 06/13/18 12:46:58 32   (NOTE)   Estimated GFR . .. Low  8.5           09/01/16 13:30:00 09/01/16 17:06:08 32   (NOTE)   Estimated GFR . .. Low  8.7           08/14/15 08:16:00 08/14/15 09:30:27 38   (NOTE)   Estimated GFR . .. Low  8.7           03/24/14 11:15:00 03/24/14 12:31:39 42   (NOTE) Estimated GFR i... Low  8.8           02/07/14 09:15:00 02/07/14 12:37:39 43   (NOTE) Estimated GFR i... Low  8.6                 Final result                          TROPONIN-HIGH SENSITIVITY (Final result)   Component (Lab Inquiry)  Collection Time Result Time TROPHS   09/06/22 18:00:00 09/06/22 18:54:47 23   A HS troponin value ch. .. Final result                          NT-PRO BNP (Final result)   Component (Lab Inquiry)  Collection Time Result Time PBNP   09/06/22 18:00:00 09/06/22 18:51:55 1,311              For patients. .. Final result                           Contains abnormal data PROTHROMBIN TIME + INR (Final result)   Component (Lab Inquiry)  Collection Time Result Time PTP INR   09/06/22 18:00:00 09/06/22 19:47:48 18.4 High  1.5              INR Therape. ..  High          Final result                           Contains abnormal data PTT (Final result)   Component (Lab Inquiry)  Collection Time Result Time APTT   09/06/22 18:00:00 09/06/22 19:47:48 40.5 High          Final result                         Radiologic Studies -   XR CHEST PA LAT   Final Result      Mild pulmonary edema        CT Results  (Last 48 hours)      None          CXR Results  (Last 48 hours)      None            Medications given in the ED-  Medications   furosemide (LASIX) injection 80 mg (80 mg IntraVENous Given 9/6/22 1943)   albumin human 25% (BUMINATE) solution 12.5 g (0 g IntraVENous IV Completed 9/6/22 2219)   tamsulosin (FLOMAX) capsule 0.4 mg (0.4 mg Oral Given 9/6/22 2126)         Medical Decision Making   I am the first provider for this patient. I reviewed the vital signs, available nursing notes, past medical history, past surgical history, family history and social history. Vital Signs-Reviewed the patient's vital signs. Pulse Oximetry Analysis - 100% on room air    Cardiac Monitor:  Rate: 70 bpm  Rhythm: Sinus rhythm    EKG interpretation: (Preliminary)  5:17 PM   Sinus rhythm with right bundle branch block, nonspecific ST changes, QTC is 400. EKG read by Jewell Jaffe MD      Records Reviewed: NURSING NOTES AND PREVIOUS MEDICAL RECORDS    Provider Notes (Medical Decision Making):   74 generalized body edema, suspicious for anasarca nephrotic syndrome changes in renal function CHF. Also consider PE. On x-ray he has trace no pulmonary edema, was given Lasix with improvement. Interestingly, his blood work shows no change in his chronic condition. However only clarification permission to continue to treat her symptoms recommend outpatient follow-up with Dr. Ashwini Kimbrough    Procedures:  Procedures    ED Course:   7:11 PM: Initial assessment performed. The patients presenting problems have been discussed, and they are in agreement with the care plan formulated and outlined with them. I have encouraged them to ask questions as they arise throughout their visit.     Diagnosis and Disposition       DISCHARGE NOTE:  9: 15 p.m. Carlos Birmingham Jr.'s  results have been reviewed with him. He has been counseled regarding his diagnosis, treatment, and plan. He verbally conveys understanding and agreement of the signs, symptoms, diagnosis, treatment and prognosis and additionally agrees to follow up as discussed. He also agrees with the care-plan and conveys that all of his questions have been answered. I have also provided discharge instructions for him that include: educational information regarding their diagnosis and treatment, and list of reasons why they would want to return to the ED prior to their follow-up appointment, should his condition change. He has been provided with education for proper emergency department utilization. CLINICAL IMPRESSION:    1. Pedal edema    2. Stage 3 chronic kidney disease, unspecified whether stage 3a or 3b CKD ([facility] [address]    3. Acute pulmonary edema (HCC)        PLAN:  1. D/C Home  2. Discharge Medication List as of 9/6/2022  9:19 PM        CONTINUE these medications which have CHANGED    Details   bumetanide (BUMEX) 1 mg tablet Take 2 Tablets by mouth two (2) times a day for 30 days. , Normal, Disp-120 Tablet, R-0      doxazosin (CARDURA) 4 mg tablet Take 1 Tablet by mouth nightly. Indications: enlarged prostate with urination problem, high blood pressure, Normal, Disp-20 Tablet, R-0           CONTINUE these medications which have NOT CHANGED    Details   allopurinol (ZYLOPRIM) 100 mg tablet Take 100 mg by mouth nightly. Indications: GOUT, Historical Med      magnesium oxide (MAG-OX) 400 mg tablet Take 400 mg by mouth nightly., Historical Med      cholecalciferol (VITAMIN D3) 1,000 unit cap Take 1,000 Units by mouth nightly.  Indications: VITAMIN D DEFICIENCY, Historical Med      cyanocobalamin (VITAMIN B12) 1,000 mcg/mL injection 1,000 mcg by IntraMUSCular route every month., Historical Med      cholestyramine light (QUESTRAN LITE) 4 gram packet Take 4 g by mouth once., Historical Med      omega-3 fatty acids-vitamin e (FISH OIL) 1,000 mg cap Take 1 Cap by mouth daily. Pt. Instructed to stop herbal medications 7 days prior. , Historical Med      desloratadine (CLARINEX) 5 mg tablet Take 5 mg by mouth nightly. Indications: ALLERGIC RHINITIS, Historical Med      METOPROLOL SUCCINATE PO Take 37.5 mg by mouth every evening., Historical Med      ferrous sulfate (IRON) 325 mg (65 mg iron) tablet Take  by mouth Daily (before breakfast). , Historical Med           3. Follow-up Information       Follow up With Specialties Details Why [address] [facility], [facility],  Nephrology, Internal Medicine Physician In 1 week  [address] [place] [address]      Stoney Montiel MD Family Medicine In 1 week  [address] [facility]      Bonilla Abreu MD Urology In 1 week  [address]  [phone]            _______________________________    This note was partially transcribed via voice recognition software. Although efforts have been made to catch any discrepancies, it may contain sound alike words, grammatical errors, or nonsensical words.

## 2022-09-15 ENCOUNTER — TRANSCRIBE ORDER (OUTPATIENT)
Dept: REGISTRATION | Age: 82
End: 2022-09-15

## 2022-09-15 ENCOUNTER — HOSPITAL ENCOUNTER (OUTPATIENT)
Dept: LAB | Age: 82
Discharge: HOME | End: 2022-09-15
Payer: MEDICARE

## 2022-09-15 DIAGNOSIS — R06.02 SHORTNESS OF BREATH: Primary | ICD-10-CM

## 2022-09-15 LAB
ALBUMIN SERPL-MCNC: 2.7 G/DL (ref 3.4–5)
ALBUMIN/GLOB SERPL: 0.7 {RATIO} (ref 0.8–1.7)
ALP SERPL-CCNC: 81 U/L (ref 45–117)
ALT SERPL-CCNC: 26 U/L (ref 16–61)
ANION GAP SERPL CALC-SCNC: 6 MMOL/L (ref 3–18)
AST SERPL-CCNC: 28 U/L (ref 10–38)
BILIRUB SERPL-MCNC: 1.2 MG/DL (ref 0.2–1)
BUN SERPL-MCNC: 32 MG/DL (ref 7–18)
BUN/CREAT SERPL: 15 (ref 12–20)
CALCIUM SERPL-MCNC: 8.1 MG/DL (ref 8.5–10.1)
CHLORIDE SERPL-SCNC: 110 MMOL/L (ref 100–111)
CO2 SERPL-SCNC: 26 MMOL/L (ref 21–32)
CREAT SERPL-MCNC: 2.15 MG/DL (ref 0.6–1.3)
GLOBULIN SER CALC-MCNC: 3.8 G/DL (ref 2–4)
GLUCOSE SERPL-MCNC: 129 MG/DL (ref 74–99)
MAGNESIUM SERPL-MCNC: 1.8 MG/DL (ref 1.6–2.6)
POTASSIUM SERPL-SCNC: 3.5 MMOL/L (ref 3.5–5.5)
PROT SERPL-MCNC: 6.5 G/DL (ref 6.4–8.2)
SODIUM SERPL-SCNC: 142 MMOL/L (ref 136–145)

## 2022-09-15 PROCEDURE — 83735 ASSAY OF MAGNESIUM: CPT

## 2022-09-15 PROCEDURE — 36415 COLL VENOUS BLD VENIPUNCTURE: CPT

## 2022-09-15 PROCEDURE — 80053 COMPREHEN METABOLIC PANEL: CPT

## 2022-09-16 LAB
FAX TO INFO,FAXT: NORMAL
FAX TO NUMBER,FAXN: NORMAL

## 2022-10-06 ENCOUNTER — HOSPITAL ENCOUNTER (INPATIENT)
Age: 82
LOS: 1 days | Discharge: HOME | DRG: 071 | End: 2022-10-07
Attending: STUDENT IN AN ORGANIZED HEALTH CARE EDUCATION/TRAINING PROGRAM | Admitting: STUDENT IN AN ORGANIZED HEALTH CARE EDUCATION/TRAINING PROGRAM
Payer: MEDICARE

## 2022-10-06 ENCOUNTER — APPOINTMENT (OUTPATIENT)
Dept: CT IMAGING | Age: 82
DRG: 071 | End: 2022-10-06
Attending: EMERGENCY MEDICINE
Payer: MEDICARE

## 2022-10-06 DIAGNOSIS — R41.82 ALTERED MENTAL STATUS, UNSPECIFIED ALTERED MENTAL STATUS TYPE: Primary | ICD-10-CM

## 2022-10-06 DIAGNOSIS — R60.0 BILATERAL LEG EDEMA: ICD-10-CM

## 2022-10-06 LAB
ANION GAP SERPL CALC-SCNC: 7 MMOL/L (ref 3–18)
BASOPHILS # BLD: 0 K/UL (ref 0–0.1)
BASOPHILS NFR BLD: 0 % (ref 0–2)
BUN SERPL-MCNC: 48 MG/DL (ref 7–18)
BUN/CREAT SERPL: 17 (ref 12–20)
CALCIUM SERPL-MCNC: 8.4 MG/DL (ref 8.5–10.1)
CHLORIDE SERPL-SCNC: 111 MMOL/L (ref 100–111)
CO2 SERPL-SCNC: 25 MMOL/L (ref 21–32)
CREAT SERPL-MCNC: 2.78 MG/DL (ref 0.6–1.3)
DIFFERENTIAL METHOD BLD: ABNORMAL
EOSINOPHIL # BLD: 0 K/UL (ref 0–0.4)
EOSINOPHIL NFR BLD: 0 % (ref 0–5)
ERYTHROCYTE [DISTWIDTH] IN BLOOD BY AUTOMATED COUNT: 13.4 % (ref 11.6–14.5)
GLUCOSE BLD STRIP.AUTO-MCNC: 232 MG/DL (ref 70–110)
GLUCOSE SERPL-MCNC: 243 MG/DL (ref 74–99)
HCT VFR BLD AUTO: 37.2 % (ref 36–48)
HGB BLD-MCNC: 11.9 G/DL (ref 13–16)
IMM GRANULOCYTES # BLD AUTO: 0 K/UL (ref 0–0.04)
IMM GRANULOCYTES NFR BLD AUTO: 1 % (ref 0–0.5)
LYMPHOCYTES # BLD: 0.3 K/UL (ref 0.9–3.6)
LYMPHOCYTES NFR BLD: 14 % (ref 21–52)
MCH RBC QN AUTO: 33.1 PG (ref 24–34)
MCHC RBC AUTO-ENTMCNC: 32 G/DL (ref 31–37)
MCV RBC AUTO: 103.6 FL (ref 78–100)
MONOCYTES # BLD: 0.1 K/UL (ref 0.05–1.2)
MONOCYTES NFR BLD: 4 % (ref 3–10)
NEUTS SEG # BLD: 1.4 K/UL (ref 1.8–8)
NEUTS SEG NFR BLD: 81 % (ref 40–73)
NRBC # BLD: 0 K/UL (ref 0–0.01)
NRBC BLD-RTO: 0 PER 100 WBC
PLATELET # BLD AUTO: 35 K/UL (ref 135–420)
PLATELET COMMENTS,PCOM: ABNORMAL
PMV BLD AUTO: 12.6 FL (ref 9.2–11.8)
POTASSIUM SERPL-SCNC: 3.6 MMOL/L (ref 3.5–5.5)
RBC # BLD AUTO: 3.59 M/UL (ref 4.35–5.65)
RBC MORPH BLD: ABNORMAL
SODIUM SERPL-SCNC: 143 MMOL/L (ref 136–145)
WBC # BLD AUTO: 1.8 K/UL (ref 4.6–13.2)

## 2022-10-06 PROCEDURE — 74011250637 HC RX REV CODE- 250/637: Performed by: FAMILY MEDICINE

## 2022-10-06 PROCEDURE — 70450 CT HEAD/BRAIN W/O DYE: CPT

## 2022-10-06 PROCEDURE — 85025 COMPLETE CBC W/AUTO DIFF WBC: CPT

## 2022-10-06 PROCEDURE — 80048 BASIC METABOLIC PNL TOTAL CA: CPT

## 2022-10-06 PROCEDURE — 65270000046 HC RM TELEMETRY

## 2022-10-06 PROCEDURE — 99285 EMERGENCY DEPT VISIT HI MDM: CPT

## 2022-10-06 PROCEDURE — 74011250636 HC RX REV CODE- 250/636: Performed by: FAMILY MEDICINE

## 2022-10-06 PROCEDURE — 82962 GLUCOSE BLOOD TEST: CPT

## 2022-10-06 PROCEDURE — 77030040361 HC SLV COMPR DVT MDII -B

## 2022-10-06 RX ORDER — PREDNISONE 20 MG/1
TABLET ORAL
COMMUNITY
Start: 2022-10-03

## 2022-10-06 RX ORDER — ONDANSETRON 2 MG/ML
4 INJECTION INTRAMUSCULAR; INTRAVENOUS
Status: DISCONTINUED | OUTPATIENT
Start: 2022-10-06 | End: 2022-10-07 | Stop reason: HOSPADM

## 2022-10-06 RX ORDER — ATORVASTATIN CALCIUM 20 MG/1
40 TABLET, FILM COATED ORAL
Status: DISCONTINUED | OUTPATIENT
Start: 2022-10-06 | End: 2022-10-07 | Stop reason: HOSPADM

## 2022-10-06 RX ORDER — SODIUM CHLORIDE 9 MG/ML
100 INJECTION, SOLUTION INTRAVENOUS CONTINUOUS
Status: DISCONTINUED | OUTPATIENT
Start: 2022-10-06 | End: 2022-10-07 | Stop reason: HOSPADM

## 2022-10-06 RX ORDER — LABETALOL HCL 20 MG/4 ML
5 SYRINGE (ML) INTRAVENOUS
Status: DISCONTINUED | OUTPATIENT
Start: 2022-10-06 | End: 2022-10-07 | Stop reason: HOSPADM

## 2022-10-06 RX ORDER — ACETAMINOPHEN 325 MG/1
650 TABLET ORAL
Status: DISCONTINUED | OUTPATIENT
Start: 2022-10-06 | End: 2022-10-07 | Stop reason: HOSPADM

## 2022-10-06 RX ORDER — POLYETHYLENE GLYCOL 3350 17 G/17G
17 POWDER, FOR SOLUTION ORAL DAILY PRN
Status: DISCONTINUED | OUTPATIENT
Start: 2022-10-06 | End: 2022-10-07 | Stop reason: HOSPADM

## 2022-10-06 RX ORDER — FAMOTIDINE 20 MG/1
20 TABLET, FILM COATED ORAL EVERY 12 HOURS
Status: DISCONTINUED | OUTPATIENT
Start: 2022-10-06 | End: 2022-10-07 | Stop reason: HOSPADM

## 2022-10-06 RX ADMIN — SODIUM CHLORIDE 100 ML/HR: 9 INJECTION, SOLUTION INTRAVENOUS at 20:25

## 2022-10-06 RX ADMIN — ATORVASTATIN CALCIUM 40 MG: 20 TABLET, FILM COATED ORAL at 21:22

## 2022-10-06 RX ADMIN — FAMOTIDINE 20 MG: 20 TABLET, FILM COATED ORAL at 20:25

## 2022-10-06 NOTE — ED PROVIDER NOTES
EMERGENCY DEPARTMENT HISTORY AND PHYSICAL EXAM      Date: 10/6/2022  Patient Name: Wilda Thomas. History of Presenting Illness     Chief Complaint   Patient presents with    Altered mental status       Location/Duration/Severity/Modifying factors   Wilda Gamino is a 78 y.o. male with a PMH Hypertension, Crohn's, CKD presents to the ER with confusion. As per family, patient started having episodes of confusion 7 days ago, went to primary care provider and was diagnosed with a UTI for which he was prescribed Bactrim. Patient completed his course of medications, as per family his confusion improved for a few days and then yesterday evening started having gradually worsening confusion again. Today he went to his primary care provider where a UA was repeated, urine noted to be clear of infection. At that time his primary care provider suggested he come to the ER for evaluation for his altered mental status. As per patient's family members, patient also started prednisone approximately 13 days ago for thrombocytopenia. No recent trauma/falls, no other medical complaints. Altered mental status   Functional status baseline:  [EPIC#1537^NOTE}     There are no other complaints, changes, or physical findings at this time.     PCP: Cali Hayes NP    Current Facility-Administered Medications   Medication Dose Route Frequency Provider Last Rate Last Admin    0.9% sodium chloride infusion  100 mL/hr IntraVENous CONTINUOUS Karol Jackson  mL/hr at 10/06/22 2025 100 mL/hr at 10/06/22 2025    ondansetron (ZOFRAN) injection 4 mg  4 mg IntraVENous Q6H PRN Karol Jackson MD        atorvastatin (LIPITOR) tablet 40 mg  40 mg Oral QHS Karol Jackson MD   40 mg at 10/06/22 2122    acetaminophen (TYLENOL) tablet 650 mg  650 mg Oral Q4H PRN Karol Jackson MD        labetaloL (NORMODYNE;TRANDATE) 20 mg/4 mL (5 mg/mL) injection 5 mg  5 mg IntraVENous Q10MIN PRN Manuel Juli Dasilva MD        polyethylene glycol (MIRALAX) packet 17 g  17 g Oral DAILY PRN Juli Jackson MD        famotidine (PEPCID) tablet 20 mg  20 mg Oral Q12H Juli Jackson MD   20 mg at 10/06/22 2025       Past History     Past Medical History:  Past Medical History:   Diagnosis Date    Bladder calculus     Chronic kidney disease     H/O kidney stones    Crohn disease ([address]     Foot drop     left    Hypertension     Kidney calculi        Past Surgical History:  Past Surgical History:   Procedure Laterality Date    HX BACK SURGERY      3 back surgery nola and screw    HX CATARACT REMOVAL Bilateral     HX GI      bowel [id number]    HX GI      hemorrhoidectomy    HX UROLOGICAL      kidney stone removal       Family History:  History reviewed. No pertinent family history. Social History:  Social History     Tobacco Use    Smoking status: Former     Types: Cigarettes     Quit date: 2000     Years since quittin.1    Smokeless tobacco: Never   Vaping Use    Vaping Use: Never used   Substance Use Topics    Alcohol use: No    Drug use: No       Allergies:  No Known Allergies      Review of Systems     Review of Systems   Unable to perform ROS: Mental status change     Physical Exam     Physical Exam  Vitals and nursing note reviewed. Constitutional:       General: He is not in acute distress. Appearance: Normal appearance. He is well-developed. He is not ill-appearing or toxic-appearing. HENT:      Head: Normocephalic and atraumatic. Right Ear: External ear normal.      Left Ear: External ear normal.      Nose: Nose normal.   Eyes:      Extraocular Movements: Extraocular movements intact. Conjunctiva/sclera: Conjunctivae normal.      Pupils: Pupils are equal, round, and reactive to light. Cardiovascular:      Rate and Rhythm: Regular rhythm. Bradycardia present. Pulses: Normal pulses. Heart sounds: Normal heart sounds.    Pulmonary:      Effort: Pulmonary effort is normal.      Breath sounds: Normal breath sounds. Abdominal:      General: Abdomen is flat. Palpations: Abdomen is soft. Tenderness: There is no abdominal tenderness. There is no guarding. Musculoskeletal:         General: No deformity or signs of injury. Normal range of motion. Cervical back: Neck supple. Skin:     General: Skin is warm and dry. Capillary Refill: Capillary refill takes less than 2 seconds. Neurological:      General: No focal deficit present. Mental Status: He is alert. He is disoriented. GCS: GCS eye subscore is 4. GCS verbal subscore is 5. GCS motor subscore is 6. Cranial Nerves: Cranial nerves 2-12 are intact. Sensory: Sensation is intact. Motor: Motor function is intact. Comments: Patient is alert and oriented to person only. At baseline he usually is alert and oriented x4.    Psychiatric:         Mood and Affect: Mood normal.       Lab and Diagnostic Study Results     Diagnostic Study Results     Labs -     Recent Results (from the past 12 hour(s))   GLUCOSE, POC    Collection Time: 10/06/22  4:44 PM   Result Value Ref Range    Glucose (POC) 232 (H) 70 - 409 mg/dL   METABOLIC PANEL, BASIC    Collection Time: 10/06/22  4:45 PM   Result Value Ref Range    Sodium 143 136 - 145 mmol/L    Potassium 3.6 3.5 - 5.5 mmol/L    Chloride 111 100 - 111 mmol/L    CO2 25 21 - 32 mmol/L    Anion gap 7 3.0 - 18 mmol/L    Glucose 243 (H) 74 - 99 mg/dL    BUN 48 (H) 7.0 - 18 MG/DL    Creatinine 2.78 (H) 0.6 - 1.3 MG/DL    BUN/Creatinine ratio 17 12 - 20      eGFR 22 (L) >60 ml/min/1.73m2    Calcium 8.4 (L) 8.5 - 10.1 MG/DL   CBC WITH AUTOMATED DIFF    Collection Time: 10/06/22  4:45 PM   Result Value Ref Range    WBC 1.8 (L) 4.6 - 13.2 K/uL    RBC 3.59 (L) 4.35 - 5.65 M/uL    HGB 11.9 (L) 13.0 - 16.0 g/dL    HCT 37.2 36.0 - 48.0 %    .6 (H) 78.0 - 100.0 FL    MCH 33.1 24.0 - 34.0 PG    MCHC 32.0 31.0 - 37.0 g/dL    RDW 13.4 11.6 - 14.5 % PLATELET 35 (L) 359 - 420 K/uL    MPV 12.6 (H) 9.2 - 11.8 FL    NRBC 0.0 0  WBC    ABSOLUTE NRBC 0.00 0.00 - 0.01 K/uL    NEUTROPHILS 81 (H) 40 - 73 %    LYMPHOCYTES 14 (L) 21 - 52 %    MONOCYTES 4 3 - 10 %    EOSINOPHILS 0 0 - 5 %    BASOPHILS 0 0 - 2 %    IMMATURE GRANULOCYTES 1 (H) 0.0 - 0.5 %    ABS. NEUTROPHILS 1.4 (L) 1.8 - 8.0 K/UL    ABS. LYMPHOCYTES 0.3 (L) 0.9 - 3.6 K/UL    ABS. MONOCYTES 0.1 0.05 - 1.2 K/UL    ABS. EOSINOPHILS 0.0 0.0 - 0.4 K/UL    ABS. BASOPHILS 0.0 0.0 - 0.1 K/UL    ABS. IMM. GRANS. 0.0 0.00 - 0.04 K/UL    DF AUTOMATED      PLATELET COMMENTS PLATELET COUNT VERIFIED BY SMEAR REVIEW     RBC COMMENTS NORMOCYTIC, NORMOCHROMIC         Radiologic Studies -  CT HEAD WO CONT   Final Result      1. No acute intracranial pathology. Above code S stroke alert results discussed with patient's ER provider 5:03 PM   10/6/2022. MRI BRAIN WO CONT    (Results Pending)     CT Results  (Last 48 hours)                 10/06/22 1656  CT HEAD WO CONT Final result    Impression:      1. No acute intracranial pathology. Above code S stroke alert results discussed with patient's ER provider 5:03 PM   10/6/2022. Narrative:  EXAMINATION:  CT head noncontrast       COMPARISON: None       HISTORY: Confusion       TECHNIQUE: Noncontrasted axial images were obtained through the patient's brain   from the vertex of the skull through the skull base. Bone and soft tissue   windows were reviewed. One or more dose reduction techniques were used on this   CT: automated exposure control, adjustment of the mAs and/or kVp according to   patient size, and iterative reconstruction techniques. The specific techniques   used on this CT exam have been documented in the patient's electronic medical   record.   Digital Imaging and Communications in Medicine (DICOM) format image   data are available to nonaffiliated external healthcare facilities or entities   on a secure, media free, reciprocally searchable basis with patient   authorization for at least a 12-month period after this study. Gilda Burt FINDINGS: Brain architecture is normal. No mass effect, midline shift or   hemorrhage. Ventricles are normal in size, position and configuration. No   abnormal extra-axial fluid collections are seen. Scattered and confluent foci of   decreased attenuation noted of the periventricular white matter, nonspecific,   but most likely sequelae of chronic microvascular disease. No territorial signs   of infarct. The bony calvarium appears intact without acute displaced fracture. The visualized paranasal sinuses and mastoid air cells are aerated. CXR Results  (Last 48 hours)      None            Medications given in the ED-  Medications   0.9% sodium chloride infusion (100 mL/hr IntraVENous New Bag 10/6/22 2025)   ondansetron (ZOFRAN) injection 4 mg (has no administration in time range)   atorvastatin (LIPITOR) tablet 40 mg (40 mg Oral Given 10/6/22 2122)   acetaminophen (TYLENOL) tablet 650 mg (has no administration in time range)   labetaloL (NORMODYNE;TRANDATE) 20 mg/4 mL (5 mg/mL) injection 5 mg (has no administration in time range)   polyethylene glycol (MIRALAX) packet 17 g (has no administration in time range)   famotidine (PEPCID) tablet 20 mg (20 mg Oral Given 10/6/22 2025)           Medical Decision Making and ED Course   - I am the first and primary provider for this patient AND AM THE PRIMARY PROVIDER OF RECORD. - I reviewed the vital signs, available nursing notes, past medical history, past surgical history, family history and social history. - Initial assessment performed. The patients presenting problems have been discussed, and the staff are in agreement with the care plan formulated and outlined with them. I have encouraged them to ask questions as they arise throughout their visit. Vital Signs-Reviewed the patient's vital signs.     Patient Vitals for the past 12 hrs:   Temp Pulse Resp BP SpO2   10/06/22 1936 97.6 °F (36.4 °C) (!) 57 14 (!) 137/53 98 %   10/06/22 1857 97.7 °F (36.5 °C) (!) 54 20 (!) 141/51 99 %   10/06/22 1833 97.7 °F (36.5 °C) (!) 53 12 (!) 144/63 97 %   10/06/22 1751 -- -- -- -- 96 %   10/06/22 1751 -- (!) 56 14 (!) 155/33 96 %   10/06/22 1740 -- (!) 54 12 135/60 99 %   10/06/22 1645 98 °F (36.7 °C) (!) 54 18 116/68 97 %     Records Reviewed: Nursing Notes        Provider Notes (Medical Decision Making):     MDM  Number of Diagnoses or Management Options  Altered mental status, unspecified altered mental status type  Diagnosis management comments: Well-appearing male in no acute distress presents to the ER for gradual change in mental status. Code stroke was called prior to my initial evaluation. Patient's fingerstick glucose was slightly above 200. CT of his head was within normal limits. Patient's change in mental status possibly from steroid psychosis versus infection versus CVA. Teleneurology consult appreciated. No tPA indicated at this time, no aspirin as patient is at an increased risk for bleeding due to his thrombocytopenia. Will admit to the hospitalist service for AMS and CVA work-up, MRI, and medical optimization. ED Course:   5:14 PM Initial assessment performed. The patients presenting problems have been discussed, and they are in agreement with the care plan formulated and outlined with them. I have encouraged them to ask questions as they arise throughout their visit. Consultations:       Consultations: - Tele-Neurology Consultant: Dr. Sol Savage: We have asked for emergent assistance with regard to this patient. We have discussed the acute and any chronic neurologic presentations of this patient with our Neurologist partner as well as the findings on the imaging and labs studies available thus far.   They are recommending admission to telemetry for CVA work-up, will hold aspirin due to thrombocytopenia, no indication for tPA at this time        Procedures and Critical Care       CRITICAL CARE NOTE :  5:14 PM    Critical Care Time:   I have spent 40 minutes of critical care time involved in lab review, consultations with specialist, family decision-making, and documentation. This time does not include time spent on separately billable procedures. During this entire length of time I was immediately available to the patient. Critical Care: The reason for providing this level of medical care for this critically ill patient was due a critical illness that impaired one or more vital organ systems such that there was a high probability of imminent or life threatening deterioration in the patients condition. This care involved high complexity decision making to assess, manipulate, and support vital system functions, to treat this degreee vital organ system failure and to prevent further life threatening deterioration of the patients condition. Jayme Johnson DO        Disposition     Admitted      Diagnosis     CLINICAL IMPRESSION:    1. Altered mental status, unspecified altered mental status type        PLAN:  1. Admit to telemetry  2. Current Discharge Medication List        3. Follow-up Information    None         Attestations:    Jayme Johnson DO    Please note that this dictation was completed with [employer], the computer voice recognition software. Quite often unanticipated grammatical, syntax, homophones, and other interpretive errors are inadvertently transcribed by the computer software. Please disregard these errors. Please excuse any errors that have escaped final proofreading. Thank you.

## 2022-10-06 NOTE — ED NOTES
TRANSFER - OUT REPORT:    Verbal report given to Tele (name) on [facility].  being transferred to [facility](unit) for routine progression of care       Report consisted of patients Situation, Background, Assessment and   Recommendations(SBAR). Information from the following report(s) SBAR, Kardex, ED Summary and Cardiac Rhythm Sinus jose  was reviewed with the receiving nurse. Lines:   Peripheral IV 10/06/22 Left Antecubital (Active)        Opportunity for questions and clarification was provided.       Patient transported with:   Registered Nurse

## 2022-10-06 NOTE — H&P
History & Physical    Patient: Ramez Jensen MRN: [id number]  CSN: [id number]    YOB: 1940  Age: 78 y.o. Sex: male      DOA: 10/6/2022  Primary Care Provider:  Janiya Heath, NP      Assessment/Plan   Ramze Jensen is a 78 y.o. male with a h/o Hypertension, Crohn's, CKD and aortoc stenosis who presents to the ER with confusion. Admitted for acute metabolic encephalopathy and stroke evaluation. Admit to telemetry  Cardiac monitoring  Avoid aspirin due to due to profound thrombocytopenia  Neurochecks per stroke protocol  Permissive hypertension, labetalol as needed blood pressure greater than 200/100  IV normal saline continuous infusion 100 cc/h  Euglycemia with sliding scale insulin  Euthermia with acetaminophen 650 mg every 6 hours as needed fever  Avoid urinary catheters please  Echocardiogram with bubble study  Lipid panel, hemoglobin A1c  SCDs and DVT prophylaxis  Pepcid and GI prophylaxis  PT/OT and SLP consults  MRI brain without contrast  Neurology consulted by ED, Dr. Kandice Kearns following  CT scan head unremarkable  CT angiogram of brain and neck unremarkable    Thrombocytopenia-  Platelet count 95,070  Followed  by hematology as outpatient  Resume home dose of prednisone 80 mg daily  Daily CBC  Monitor for signs of bleeding    History of aortic stenosis-  Followed by Dr. Nereyda Pathak as outpatient  Bumex, home medication    CKD-  Creatinine 2.78, on 9/15/2022 was 2.1  Avoid nephrotoxic agents, renally dose medications as appropriate  Strict I's and O's, daily weights  Daily BMP  Followed by Dr. Brenna Roblero as OP    History of Crohn's disease-  Supportive care    DVT and GI prophylaxis ordered      Patient Active Problem List   Diagnosis Code    Altered mental status G66.74    Acute metabolic encephalopathy R26.08    Thrombocytopenia ([facility] [address] D69.6    Aortic stenosis I35.0    CKD (chronic kidney disease) N18.9       Estimated length of stay :3-4 days    CC:  Altered mental status       HPI:     Roger Howard Michelle Jimenez. is a 78 y.o. male with a h/o Hypertension, Crohn's, CKD and aortoc stenosis who presents to the ER with confusion. As per family, patient started having episodes of confusion 7 days ago, went to his hematologist and was diagnosed with a UTI for which he was prescribed Bactrim. Patient completed his course of medications, as per family , his confusion improved for a few days and then yesterday evening started having gradually worsening confusion again. Today he went to his primary care provider where a UA was repeated, urine noted to be clear of infection. At that time his primary care provider suggested he come to the ER for evaluation for his altered mental status. As per patient's family members, patient also started prednisone approximately 13 days ago for thrombocytopenia. No recent trauma/falls, no other medical complaints. Past Medical History:   Diagnosis Date    Bladder calculus     Chronic kidney disease     H/O kidney stones    Crohn disease ([address]     Foot drop     left    Hypertension     Kidney calculi        Past Surgical History:   Procedure Laterality Date    HX BACK SURGERY      3 back surgery nola and screw    HX CATARACT REMOVAL Bilateral     HX GI      bowel [id number]    HX GI      hemorrhoidectomy    HX UROLOGICAL      kidney stone removal       History reviewed. No pertinent family history.     Social History     Socioeconomic History    Marital status:      Spouse name: Shelby Sibley   Tobacco Use    Smoking status: Former     Types: Cigarettes     Quit date: 2000     Years since quittin.1    Smokeless tobacco: Never   Vaping Use    Vaping Use: Never used   Substance and Sexual Activity    Alcohol use: No    Drug use: No    Sexual activity: Never   Other Topics Concern     Service Yes     Comment: [employer] yrs [employer]    Blood Transfusions No    Caffeine Concern No    Occupational Exposure No    Hobby Hazards No    Sleep Concern No    Stress Concern No Weight Concern No    Special Diet No    Back Care No    Exercise Yes    Bike Helmet Yes    Seat Belt Yes    Self-Exams No       Prior to Admission medications    Medication Sig Start Date End Date Taking? Authorizing Provider   predniSONE (DELTASONE) 20 mg tablet TAKE 4 TABLETS BY MOUTH DAILY. TAKE WITH FOOD TO PREVENT STOMACH UPSET 10/3/22   Other, MD Yaritza   bumetanide (BUMEX) 1 mg tablet Take 2 Tablets by mouth two (2) times a day for 30 days. 9/6/22 10/6/22  Mariana Wilkerson MD   doxazosin (CARDURA) 4 mg tablet Take 1 Tablet by mouth nightly. Indications: enlarged prostate with urination problem, high blood pressure 22   Mariana Wilkerson MD   magnesium oxide (MAG-OX) 400 mg tablet Take 400 mg by mouth nightly. Provider, Historical   cholestyramine light (QUESTRAN LITE) 4 gram packet Take 4 g by mouth once. Provider, Historical   desloratadine (CLARINEX) 5 mg tablet Take 5 mg by mouth nightly. Indications: ALLERGIC RHINITIS    Provider, Historical   METOPROLOL SUCCINATE PO Take 25 mg by mouth every evening. Provider, Historical   ferrous sulfate 325 mg (65 mg iron) tablet Take  by mouth Daily (before breakfast). Provider, Historical       No Known Allergies    Review of Systems  UTO due to mental state      Physical Exam:     Physical Exam:  Visit Vitals  BP (!) 154/75 (BP 1 Location: Right upper arm, BP Patient Position: Sitting)   Pulse (!) 57   Temp 97.9 °F (36.6 °C)   Resp 17   Ht 6' (1.829 m)   Wt 113.4 kg (250 lb)   SpO2 99%   BMI 33.91 kg/m²      O2 Device: None (Room air)    Temp (24hrs), Av.8 °F (36.6 °C), Min:97.6 °F (36.4 °C), Max:98 °F (36.7 °C)    10/06 1901 - 10/07 0700  In: 351.7 [P.O.:150; I.V.:201.7]  Out: 200 [Urine:200]   No intake/output data recorded. General:  Awake, cooperative, no distress, alert and oriented to self, otherwise very confused   Head:  Normocephalic, without obvious abnormality, atraumatic.    Eyes:  Conjunctivae/corneas clear, sclera anicteric, PERRL, EOMs intact. Nose: Nares normal. No drainage or sinus tenderness. Throat: Lips, mucosa, and tongue normal.    Neck: Supple, symmetrical, trachea midline, no adenopathy. Lungs:   Clear to auscultation bilaterally. Heart:  Regular rate and rhythm, S1, S2 normal, no murmur, click, rub or gallop. Abdomen: Soft, non-tender. Bowel sounds normal. No masses,  No organomegaly. Extremities: Extremities normal, atraumatic, no cyanosis or edema. Capillary refill normal.   Pulses: 2+ and symmetric all extremities. Skin: Skin color pink, turgor normal. No rashes or lesions   Neurologic: CNII-XII intact. No focal motor or sensory deficit. Labs Reviewed:    Lab results reviewed. For significant abnormal values and values requiring intervention, see assessment and plan.   Recent Results (from the past 24 hour(s))   GLUCOSE, POC    Collection Time: 10/06/22  4:44 PM   Result Value Ref Range    Glucose (POC) 232 (H) 70 - 575 mg/dL   METABOLIC PANEL, BASIC    Collection Time: 10/06/22  4:45 PM   Result Value Ref Range    Sodium 143 136 - 145 mmol/L    Potassium 3.6 3.5 - 5.5 mmol/L    Chloride 111 100 - 111 mmol/L    CO2 25 21 - 32 mmol/L    Anion gap 7 3.0 - 18 mmol/L    Glucose 243 (H) 74 - 99 mg/dL    BUN 48 (H) 7.0 - 18 MG/DL    Creatinine 2.78 (H) 0.6 - 1.3 MG/DL    BUN/Creatinine ratio 17 12 - 20      eGFR 22 (L) >60 ml/min/1.73m2    Calcium 8.4 (L) 8.5 - 10.1 MG/DL   CBC WITH AUTOMATED DIFF    Collection Time: 10/06/22  4:45 PM   Result Value Ref Range    WBC 1.8 (L) 4.6 - 13.2 K/uL    RBC 3.59 (L) 4.35 - 5.65 M/uL    HGB 11.9 (L) 13.0 - 16.0 g/dL    HCT 37.2 36.0 - 48.0 %    .6 (H) 78.0 - 100.0 FL    MCH 33.1 24.0 - 34.0 PG    MCHC 32.0 31.0 - 37.0 g/dL    RDW 13.4 11.6 - 14.5 %    PLATELET 35 (L) 140 - 420 K/uL    MPV 12.6 (H) 9.2 - 11.8 FL    NRBC 0.0 0  WBC    ABSOLUTE NRBC 0.00 0.00 - 0.01 K/uL    NEUTROPHILS 81 (H) 40 - 73 %    LYMPHOCYTES 14 (L) 21 - 52 %    MONOCYTES 4 3 - 10 %    EOSINOPHILS 0 0 - 5 %    BASOPHILS 0 0 - 2 %    IMMATURE GRANULOCYTES 1 (H) 0.0 - 0.5 %    ABS. NEUTROPHILS 1.4 (L) 1.8 - 8.0 K/UL    ABS. LYMPHOCYTES 0.3 (L) 0.9 - 3.6 K/UL    ABS. MONOCYTES 0.1 0.05 - 1.2 K/UL    ABS. EOSINOPHILS 0.0 0.0 - 0.4 K/UL    ABS. BASOPHILS 0.0 0.0 - 0.1 K/UL    ABS. IMM.  GRANS. 0.0 0.00 - 0.04 K/UL    DF AUTOMATED      PLATELET COMMENTS PLATELET COUNT VERIFIED BY SMEAR REVIEW     RBC COMMENTS NORMOCYTIC, NORMOCHROMIC         Procedures/imaging: see electronic medical records for all procedures/Xrays and details which were not copied into this note but were reviewed prior to creation of Plan        CC: Joann Celeste NP

## 2022-10-06 NOTE — ED TRIAGE NOTES
Per wife he woke this AM and is altered \" We took him to his PCP and she sent us here to have a CT Scan. He is very unsteady on his feet and does follow directions for the most part. \"

## 2022-10-07 ENCOUNTER — APPOINTMENT (OUTPATIENT)
Dept: NON INVASIVE DIAGNOSTICS | Age: 82
DRG: 071 | End: 2022-10-07
Attending: FAMILY MEDICINE
Payer: MEDICARE

## 2022-10-07 ENCOUNTER — APPOINTMENT (OUTPATIENT)
Dept: MRI IMAGING | Age: 82
DRG: 071 | End: 2022-10-07
Attending: FAMILY MEDICINE
Payer: MEDICARE

## 2022-10-07 VITALS
RESPIRATION RATE: 16 BRPM | OXYGEN SATURATION: 97 % | HEIGHT: 72 IN | SYSTOLIC BLOOD PRESSURE: 132 MMHG | BODY MASS INDEX: 36.7 KG/M2 | TEMPERATURE: 97.7 F | WEIGHT: 271 LBS | DIASTOLIC BLOOD PRESSURE: 62 MMHG | HEART RATE: 61 BPM

## 2022-10-07 PROBLEM — G93.41 ACUTE METABOLIC ENCEPHALOPATHY: Status: ACTIVE | Noted: 2022-01-01

## 2022-10-07 PROBLEM — I35.0 AORTIC STENOSIS: Status: ACTIVE | Noted: 2022-10-07

## 2022-10-07 PROBLEM — N17.9 ACUTE KIDNEY INJURY SUPERIMPOSED ON CHRONIC KIDNEY DISEASE (HCC): Status: ACTIVE | Noted: 2022-10-07

## 2022-10-07 PROBLEM — N18.9 ACUTE KIDNEY INJURY SUPERIMPOSED ON CHRONIC KIDNEY DISEASE (HCC): Status: ACTIVE | Noted: 2022-10-07

## 2022-10-07 PROBLEM — N18.9 CKD (CHRONIC KIDNEY DISEASE): Status: ACTIVE | Noted: 2022-01-01

## 2022-10-07 PROBLEM — D69.6 THROMBOCYTOPENIA (HCC): Status: ACTIVE | Noted: 2022-10-07

## 2022-10-07 LAB
AMPHET UR QL SCN: NEGATIVE
ANION GAP SERPL CALC-SCNC: 7 MMOL/L (ref 3–18)
APPEARANCE UR: CLEAR
BARBITURATES UR QL SCN: NEGATIVE
BENZODIAZ UR QL: NEGATIVE
BILIRUB UR QL: NEGATIVE
BUN SERPL-MCNC: 47 MG/DL (ref 7–18)
BUN/CREAT SERPL: 18 (ref 12–20)
CALCIUM SERPL-MCNC: 8.4 MG/DL (ref 8.5–10.1)
CANNABINOIDS UR QL SCN: NEGATIVE
CHLORIDE SERPL-SCNC: 112 MMOL/L (ref 100–111)
CHOLEST SERPL-MCNC: 102 MG/DL
CO2 SERPL-SCNC: 25 MMOL/L (ref 21–32)
COCAINE UR QL SCN: NEGATIVE
COLOR UR: YELLOW
CREAT SERPL-MCNC: 2.57 MG/DL (ref 0.6–1.3)
ECHO AO ROOT DIAM: 3.9 CM
ECHO AO ROOT INDEX: 1.61 CM/M2
ECHO AV AREA PEAK VELOCITY: 1.3 CM2
ECHO AV AREA VTI: 1.5 CM2
ECHO AV AREA/BSA PEAK VELOCITY: 0.5 CM2/M2
ECHO AV AREA/BSA VTI: 0.6 CM2/M2
ECHO AV MEAN GRADIENT: 30 MMHG
ECHO AV MEAN VELOCITY: 2.6 M/S
ECHO AV PEAK GRADIENT: 49 MMHG
ECHO AV PEAK VELOCITY: 3.5 M/S
ECHO AV VELOCITY RATIO: 0.34
ECHO AV VTI: 85.7 CM
ECHO LA DIAMETER INDEX: 1.65 CM/M2
ECHO LA DIAMETER: 4 CM
ECHO LA TO AORTIC ROOT RATIO: 1.03
ECHO LA VOL 2C: 82 ML (ref 18–58)
ECHO LA VOL 4C: 128 ML (ref 18–58)
ECHO LA VOL BP: 105 ML (ref 18–58)
ECHO LA VOL/BSA BIPLANE: 43 ML/M2 (ref 16–34)
ECHO LA VOLUME AREA LENGTH: 109 ML
ECHO LA VOLUME INDEX A2C: 34 ML/M2 (ref 16–34)
ECHO LA VOLUME INDEX A4C: 53 ML/M2 (ref 16–34)
ECHO LA VOLUME INDEX AREA LENGTH: 45 ML/M2 (ref 16–34)
ECHO LV E' LATERAL VELOCITY: 8 CM/S
ECHO LV E' SEPTAL VELOCITY: 6 CM/S
ECHO LV EDV A2C: 137 ML
ECHO LV EDV A4C: 121 ML
ECHO LV EDV BP: 130 ML (ref 67–155)
ECHO LV EDV INDEX A4C: 50 ML/M2
ECHO LV EDV INDEX BP: 54 ML/M2
ECHO LV EDV NDEX A2C: 57 ML/M2
ECHO LV EJECTION FRACTION A2C: 70 %
ECHO LV EJECTION FRACTION A4C: 74 %
ECHO LV EJECTION FRACTION BIPLANE: 72 % (ref 55–100)
ECHO LV ESV A2C: 41 ML
ECHO LV ESV A4C: 32 ML
ECHO LV ESV BP: 36 ML (ref 22–58)
ECHO LV ESV INDEX A2C: 17 ML/M2
ECHO LV ESV INDEX A4C: 13 ML/M2
ECHO LV ESV INDEX BP: 15 ML/M2
ECHO LV FRACTIONAL SHORTENING: 29 % (ref 28–44)
ECHO LV INTERNAL DIMENSION DIASTOLE INDEX: 2.11 CM/M2
ECHO LV INTERNAL DIMENSION DIASTOLIC: 5.1 CM (ref 4.2–5.9)
ECHO LV INTERNAL DIMENSION SYSTOLIC INDEX: 1.49 CM/M2
ECHO LV INTERNAL DIMENSION SYSTOLIC: 3.6 CM
ECHO LV IVSD: 1.4 CM (ref 0.6–1)
ECHO LV MASS 2D: 255.5 G (ref 88–224)
ECHO LV MASS INDEX 2D: 105.6 G/M2 (ref 49–115)
ECHO LV POSTERIOR WALL DIASTOLIC: 1.1 CM (ref 0.6–1)
ECHO LV RELATIVE WALL THICKNESS RATIO: 0.43
ECHO LVOT AREA: 3.8 CM2
ECHO LVOT AV VTI INDEX: 0.38
ECHO LVOT DIAM: 2.2 CM
ECHO LVOT MEAN GRADIENT: 3 MMHG
ECHO LVOT PEAK GRADIENT: 5 MMHG
ECHO LVOT PEAK VELOCITY: 1.2 M/S
ECHO LVOT STROKE VOLUME INDEX: 51.7 ML/M2
ECHO LVOT SV: 125 ML
ECHO LVOT VTI: 32.9 CM
ECHO MV A VELOCITY: 1.18 M/S
ECHO MV E DECELERATION TIME (DT): 438.2 MS
ECHO MV E VELOCITY: 1.37 M/S
ECHO MV E/A RATIO: 1.16
ECHO MV E/E' LATERAL: 17.13
ECHO MV E/E' RATIO (AVERAGED): 19.98
ECHO MV E/E' SEPTAL: 22.83
ECHO PV ACCELERATION TIME (AT): 111.3 MS
ECHO PV MAX VELOCITY: 1.1 M/S
ECHO PV PEAK GRADIENT: 5 MMHG
ECHO RV FREE WALL PEAK S': 13 CM/S
ECHO RV INTERNAL DIMENSION: 4.1 CM
ECHO RV TAPSE: 2.1 CM (ref 1.7–?)
ECHO TV REGURGITANT MAX VELOCITY: 2.44 M/S
ECHO TV REGURGITANT PEAK GRADIENT: 24 MMHG
ERYTHROCYTE [DISTWIDTH] IN BLOOD BY AUTOMATED COUNT: 13.3 % (ref 11.6–14.5)
EST. AVERAGE GLUCOSE BLD GHB EST-MCNC: 114 MG/DL
GLUCOSE SERPL-MCNC: 181 MG/DL (ref 74–99)
GLUCOSE UR STRIP.AUTO-MCNC: NEGATIVE MG/DL
HBA1C MFR BLD: 5.6 % (ref 4.2–5.6)
HCT VFR BLD AUTO: 36.8 % (ref 36–48)
HDLC SERPL-MCNC: 42 MG/DL (ref 40–60)
HDLC SERPL: 2.4 {RATIO} (ref 0–5)
HDSCOM,HDSCOM: NORMAL
HGB BLD-MCNC: 12.2 G/DL (ref 13–16)
HGB UR QL STRIP: NEGATIVE
KETONES UR QL STRIP.AUTO: NEGATIVE MG/DL
LDLC SERPL CALC-MCNC: 47.6 MG/DL (ref 0–100)
LEUKOCYTE ESTERASE UR QL STRIP.AUTO: NEGATIVE
LIPID PROFILE,FLP: NORMAL
MCH RBC QN AUTO: 34.2 PG (ref 24–34)
MCHC RBC AUTO-ENTMCNC: 33.2 G/DL (ref 31–37)
MCV RBC AUTO: 103.1 FL (ref 78–100)
METHADONE UR QL: NEGATIVE
NITRITE UR QL STRIP.AUTO: NEGATIVE
NRBC # BLD: 0 K/UL (ref 0–0.01)
NRBC BLD-RTO: 0 PER 100 WBC
OPIATES UR QL: NEGATIVE
PCP UR QL: NEGATIVE
PH UR STRIP: 6 [PH] (ref 5–8)
PLATELET # BLD AUTO: 33 K/UL (ref 135–420)
PMV BLD AUTO: 11.5 FL (ref 9.2–11.8)
POTASSIUM SERPL-SCNC: 3.8 MMOL/L (ref 3.5–5.5)
PROT UR STRIP-MCNC: NEGATIVE MG/DL
RBC # BLD AUTO: 3.57 M/UL (ref 4.35–5.65)
SODIUM SERPL-SCNC: 144 MMOL/L (ref 136–145)
SP GR UR REFRACTOMETRY: 1.01 (ref 1–1.03)
TRIGL SERPL-MCNC: 62 MG/DL (ref ?–150)
UROBILINOGEN UR QL STRIP.AUTO: 0.2 EU/DL (ref 0.2–1)
VLDLC SERPL CALC-MCNC: 12.4 MG/DL
WBC # BLD AUTO: 2 K/UL (ref 4.6–13.2)

## 2022-10-07 PROCEDURE — 97116 GAIT TRAINING THERAPY: CPT

## 2022-10-07 PROCEDURE — 92526 ORAL FUNCTION THERAPY: CPT

## 2022-10-07 PROCEDURE — 74011250636 HC RX REV CODE- 250/636: Performed by: FAMILY MEDICINE

## 2022-10-07 PROCEDURE — 85027 COMPLETE CBC AUTOMATED: CPT

## 2022-10-07 PROCEDURE — 93306 TTE W/DOPPLER COMPLETE: CPT

## 2022-10-07 PROCEDURE — 92610 EVALUATE SWALLOWING FUNCTION: CPT

## 2022-10-07 PROCEDURE — 74011250637 HC RX REV CODE- 250/637: Performed by: FAMILY MEDICINE

## 2022-10-07 PROCEDURE — 80061 LIPID PANEL: CPT

## 2022-10-07 PROCEDURE — 80048 BASIC METABOLIC PNL TOTAL CA: CPT

## 2022-10-07 PROCEDURE — 36415 COLL VENOUS BLD VENIPUNCTURE: CPT

## 2022-10-07 PROCEDURE — 80307 DRUG TEST PRSMV CHEM ANLYZR: CPT

## 2022-10-07 PROCEDURE — 74011636637 HC RX REV CODE- 636/637: Performed by: FAMILY MEDICINE

## 2022-10-07 PROCEDURE — 74011000250 HC RX REV CODE- 250: Performed by: FAMILY MEDICINE

## 2022-10-07 PROCEDURE — 97530 THERAPEUTIC ACTIVITIES: CPT

## 2022-10-07 PROCEDURE — 81003 URINALYSIS AUTO W/O SCOPE: CPT

## 2022-10-07 PROCEDURE — 83036 HEMOGLOBIN GLYCOSYLATED A1C: CPT

## 2022-10-07 PROCEDURE — 97162 PT EVAL MOD COMPLEX 30 MIN: CPT

## 2022-10-07 PROCEDURE — 70551 MRI BRAIN STEM W/O DYE: CPT

## 2022-10-07 RX ORDER — BUMETANIDE 1 MG/1
2 TABLET ORAL 2 TIMES DAILY
Qty: 1 TABLET | Refills: 0 | Status: SHIPPED
Start: 2022-10-07 | End: 2022-11-06

## 2022-10-07 RX ORDER — PREDNISONE 20 MG/1
80 TABLET ORAL
Status: DISCONTINUED | OUTPATIENT
Start: 2022-10-07 | End: 2022-10-07 | Stop reason: HOSPADM

## 2022-10-07 RX ORDER — BUMETANIDE 0.25 MG/ML
1 INJECTION INTRAMUSCULAR; INTRAVENOUS EVERY 12 HOURS
Status: DISCONTINUED | OUTPATIENT
Start: 2022-10-07 | End: 2022-10-07 | Stop reason: HOSPADM

## 2022-10-07 RX ADMIN — BUMETANIDE 1 MG: 0.25 INJECTION INTRAMUSCULAR; INTRAVENOUS at 01:19

## 2022-10-07 RX ADMIN — BUMETANIDE 1 MG: 0.25 INJECTION INTRAMUSCULAR; INTRAVENOUS at 09:31

## 2022-10-07 RX ADMIN — FAMOTIDINE 20 MG: 20 TABLET, FILM COATED ORAL at 09:32

## 2022-10-07 RX ADMIN — SODIUM CHLORIDE 100 ML/HR: 9 INJECTION, SOLUTION INTRAVENOUS at 06:22

## 2022-10-07 RX ADMIN — PREDNISONE 80 MG: 20 TABLET ORAL at 09:32

## 2022-10-07 NOTE — PROGRESS NOTES
Occupational Therapy Evaluation Attempt     OT eval orders received. Chart reviewed. Attempted Occupational Therapy Evaluation, however, patient unable to be seen due to:  []  Nausea/vomiting  []  Eating  []  Pain  []  Patient too lethargic  [x]  Off Unit for testing/procedure  []  Dialysis treatment in progress  []  Telemetry Results  []  Other:      Will f/u later as patient's schedule allows.   Mariella Turner, OTR/L

## 2022-10-07 NOTE — DISCHARGE INSTRUCTIONS
DISCHARGE SUMMARY from Nurse    PATIENT INSTRUCTIONS:    After general anesthesia or intravenous sedation, for 24 hours or while taking prescription Narcotics:  Limit your activities  Do not drive and operate hazardous machinery  Do not make important personal or business decisions  Do  not drink alcoholic beverages  If you have not urinated within 8 hours after discharge, please contact your surgeon on call. Report the following to your surgeon:  Excessive pain, swelling, redness or odor of or around the surgical area  Temperature over 100.5  Nausea and vomiting lasting longer than 4 hours or if unable to take medications  Any signs of decreased circulation or nerve impairment to extremity: change in color, persistent  numbness, tingling, coldness or increase pain  Any questions    What to do at Home:  Recommended activity: Activity as tolerated,     If you experience any of the following symptoms , please follow up with . *  Please give a list of your current medications to your Primary Care Provider. *  Please update this list whenever your medications are discontinued, doses are      changed, or new medications (including over-the-counter products) are added. *  Please carry medication information at all times in case of emergency situations. These are general instructions for a healthy lifestyle:    No smoking/ No tobacco products/ Avoid exposure to second hand smoke  Surgeon General's Warning:  Quitting smoking now greatly reduces serious risk to your health.     Obesity, smoking, and sedentary lifestyle greatly increases your risk for illness    A healthy diet, regular physical exercise & weight monitoring are important for maintaining a healthy lifestyle    You may be retaining fluid if you have a history of heart failure or if you experience any of the following symptoms:  Weight gain of 3 pounds or more overnight or 5 pounds in a week, increased swelling in our hands or feet or shortness of breath while lying flat in bed. Please call your doctor as soon as you notice any of these symptoms; do not wait until your next office visit. The discharge information has been reviewed with the patient and spouse. The patient and spouse verbalized understanding. Discharge medications reviewed with the patient and spouse and appropriate educational materials and side effects teaching were provided. ___________________________________________________________________________________________________________________________________    Patient armband removed and shredded.

## 2022-10-07 NOTE — PROGRESS NOTES
Brain MRI is negative for a stroke. No further work up is indicated from neuro perspective. I will sign off. Let me know if I can be of any assistance.     Leopoldo Memos

## 2022-10-07 NOTE — PROGRESS NOTES
Physician Progress Note      Francisco J Hanks  [facility] #:                  [id number]  :                       1940  ADMIT DATE:       10/6/2022 4:37 PM  [address] [place] DATE:  RESPONDING  PROVIDER #:        Britton Espinoza MD        QUERY TEXT:    Stage of Chronic Kidney Disease: Please provide further specificity, if known. Clinical indicators include: ckd, bun, creatinine, bun/creatinine, ckd (chronic kidney disease, chronic kidney disease  Options provided:  -- Chronic kidney disease stage 1  -- Chronic kidney disease stage 2  -- Chronic kidney disease stage 3  -- Chronic kidney disease stage 3a  -- Chronic kidney disease stage 3b  -- Chronic kidney disease stage 4  -- Chronic kidney disease stage 5  -- Chronic kidney disease stage 5, requiring dialysis  -- End stage renal disease  -- Other - I will add my own diagnosis  -- Disagree - Not applicable / Not valid  -- Disagree - Clinically Unable to determine / Unknown        PROVIDER RESPONSE TEXT:    The patient has chronic kidney disease stage 3.       Electronically signed by:  Britton Espinoza MD 10/7/2022 10:04 AM

## 2022-10-07 NOTE — PROGRESS NOTES
D/C Plan: CM anticipates HH versus Home with family assistance    CM notes patient cleared of possible stroke and discharged. Reason for Admission:  acute metabolic encephalopathy                     RUR Score:   15%                  Plan for utilizing home health:   possible depending upon recommendations from therapy       PCP: First and Last name:  Gallo Kim NP     Name of Practice:    Are you a current patient: Yes/No:    Approximate date of last visit:    Can you participate in a virtual visit with your PCP:                     Current Advanced Directive/Advance Care Plan: Full Code      Healthcare Decision Maker:   Click here to complete [address] including selection of the Healthcare Decision Maker Relationship (ie \"Primary\")             Primary Decision Maker: Luiza Rodriguez - Spouse - [phone]                  Transition of Care Plan:   Home with family assistance and physician follow up       CM spoke with patient and spouse at bedside. Patient verified facesheet and PCP. Patient states he is independent at home and does have a walker. The patient states he drives himself and his wife will be able to give him a ride home. Wife is at bedside and confirmed she could give him a ride. The patient denies being active with [facility] [address] and has not been to a SNF. Care Management Interventions  PCP Verified by CM: Yes  Last Visit to PCP: 10/06/22  Mode of Transport at Discharge:  Other (see comment) (family to transport)  Discharge Durable Medical Equipment: No  Health Maintenance Reviewed: Yes  Physical Therapy Consult: Yes  Occupational Therapy Consult: Yes  Speech Therapy Consult: Yes  Support Systems: Spouse/Significant Other  Confirm Follow Up Transport: Family  The Plan for Transition of Care is Related to the Following Treatment Goals : home with family assistance  The Patient and/or Patient Representative was Provided with a Choice of Provider and Agrees with the Discharge Plan?: Yes  Discharge Location  Patient Expects to be Discharged to[de-identified] Home with family assistance

## 2022-10-07 NOTE — PROGRESS NOTES
Hospitalist Progress Note    Patient: Diane Penny MRN: [id number]  CSN: [id number]    YOB: 1940  Age: 78 y.o. Sex: male    DOA: 10/6/2022 LOS:  LOS: 1 day          Chief Complaint:    acute metabolic encephalopathy    Assessment/Plan     Diane Penny is a 78 y.o. male with a h/o Hypertension, Crohn's, CKD and aortoc stenosis who presents to the ER with confusion.   Admitted for acute metabolic encephalopathy and stroke evaluation    Toxic/metabolic encephalopathy -  Possibly related to recent UTI  Has waxing and waning characteristics   Improving mental status   No stroke found, MRI brain negative   Appreciate neurology expertise -Dr. Yadira Beard      CT scan head unremarkable  CT angiogram of brain and neck unremarkable     Thrombocytopenia-  Platelet count 20,464  Followed  by hematology as outpatient  Resume home dose of prednisone 80 mg daily  Daily CBC  Monitor for signs of bleeding     History of aortic stenosis-  Followed by Dr. Paulo Milligan as outpatient  Bumex, home medication     NAPOLEON superimposed on CKD-  Improving      History of Crohn's disease-  Supportive care      Disposition : DC home   Patient Active Problem List   Diagnosis Code    Altered mental status D45.66    Acute metabolic encephalopathy D81.39    Thrombocytopenia (HCC) D69.6    Aortic stenosis I35.0    CKD (chronic kidney disease) N18.9    Acute kidney injury superimposed on chronic kidney disease ([facility] [address] N17.9, N18.9       Subjective:    Much less confused   Significant improvement in mental status overnight     Review of systems:    Constitutional: denies fevers, chills, myalgias  Respiratory: denies SOB, cough  Cardiovascular: denies chest pain, palpitations  Gastrointestinal: denies nausea, vomiting, diarrhea      Vital signs/Intake and Output:  Visit Vitals  /62 (BP 1 Location: Left upper arm, BP Patient Position: Lying)   Pulse 61   Temp 97.7 °F (36.5 °C)   Resp 16   Ht 6' (1.829 m)   Wt 122.9 kg (271 lb)   SpO2 97% BMI 36.75 kg/m²     Current Shift:  10/07 0701 - 10/07 1900  In: -   Out: 5213 [CPLCN:0961]  Last three shifts:  10/05 1901 - 10/07 0700  In: 1200 [P.O.:150; I.V.:1050]  Out: 1050 [Urine:1050]    Exam:    General: Well developed, alert, NAD, OX3  Head/Neck: NCAT, supple, No masses, No lymphadenopathy  CVS:Regular rate and rhythm, no M/R/G, S1/S2 heard, no thrill  Lungs:Clear to auscultation bilaterally, no wheezes, rhonchi, or rales  Abdomen: Soft, Nontender, No distention, Normal Bowel sounds, No hepatomegaly  Extremities: No C/C/E, pulses palpable 2+  Skin:normal texture and turgor, no rashes, no lesions  Neuro:grossly normal , follows commands  Psych:appropriate                Labs: Results:       Chemistry Recent Labs     10/07/22  0345 10/06/22  1645   * 243*    143   K 3.8 3.6   * 111   CO2 25 25   BUN 47* 48*   CREA 2.57* 2.78*   CA 8.4* 8.4*   AGAP 7 7   BUCR 18 17      CBC w/Diff Recent Labs     10/07/22  0345 10/06/22  1645   WBC 2.0* 1.8*   RBC 3.57* 3.59*   HGB 12.2* 11.9*   HCT 36.8 37.2   PLT 33* 35*   GRANS  --  81*   LYMPH  --  14*   EOS  --  0      Cardiac Enzymes No results for input(s): CPK, CKND1, CESAR in the last 72 hours. No lab exists for component: CKRMB, TROIP   Coagulation No results for input(s): PTP, INR, APTT, INREXT, INREXT in the last 72 hours. Lipid Panel Lab Results   Component Value Date/Time    Cholesterol, total 102 10/07/2022 03:45 AM    HDL Cholesterol 42 10/07/2022 03:45 AM    LDL, calculated 47.6 10/07/2022 03:45 AM    VLDL, calculated 12.4 10/07/2022 03:45 AM    Triglyceride 62 10/07/2022 03:45 AM    CHOL/HDL Ratio 2.4 10/07/2022 03:45 AM      BNP No results for input(s): BNPP in the last 72 hours. Liver Enzymes No results for input(s): TP, ALB, TBIL, AP in the last 72 hours.     No lab exists for component: SGOT, GPT, DBIL   Thyroid Studies No results found for: T4, T3U, TSH, TSHEXT, TSHEXT     Procedures/imaging: see electronic medical records for all procedures/Xrays and details which were not copied into this note but were reviewed prior to creation of Ragini Snider MD

## 2022-10-07 NOTE — CONSULTS
NEUROLOGY CONSULTATION NOTE    Patient: Tata Garcia MRN: [id number]  [facility]: [id number]    YOB: 1940  Age: 78 y.o. Sex: male    DOA: 10/6/2022 LOS:  LOS: 1 day        Requesting Physician: Dr. Yao Preciado  Reason for Consultation: Altered mental status               HISTORY OF PRESENT ILLNESS:   Tata Garcia is a 78 y.o. right-handed male with history of hypertension, Crohn's disease, CKD and aortic stenosis, who presented with confusional state yesterday. The patient had an episode of confusional state a week ago and was found to have UTI. He was prescribed Bactrim. After the treatment, he was feeling better. Yesterday, he presented to hospital again with another confusional state. The patient is feeling much better today. He denies lateralized weakness or numbness. He uses a walker while walking due to arthritic issues and radiculopathy. His wife, Luz Maria Lerma, state that he did not have any seizures. He does not have any memory problems. PAST MEDICAL HISTORY:  Past Medical History:   Diagnosis Date    Bladder calculus     Chronic kidney disease     H/O kidney stones    Crohn disease ([address]     Foot drop     left    Hypertension     Kidney calculi      PAST SURGICAL HISTORY:  Past Surgical History:   Procedure Laterality Date    HX BACK SURGERY      3 back surgery nola and screw    HX CATARACT REMOVAL Bilateral     HX GI      bowel [id number]    HX GI      hemorrhoidectomy    HX UROLOGICAL      kidney stone removal     FAMILY HISTORY:  History reviewed. No pertinent family history.   SOCIAL HISTORY:  Social History     Socioeconomic History    Marital status:      Spouse name: Augusto Dawson   Tobacco Use    Smoking status: Former     Types: Cigarettes     Quit date: 2000     Years since quittin.1    Smokeless tobacco: Never   Vaping Use    Vaping Use: Never used   Substance and Sexual Activity    Alcohol use: No    Drug use: No    Sexual activity: Never   Other Topics Concern  Service Yes     Comment: [employer] yrs [employer]    Blood Transfusions No    Caffeine Concern No    Occupational Exposure No    Hobby Hazards No    Sleep Concern No    Stress Concern No    Weight Concern No    Special Diet No    Back Care No    Exercise Yes    Bike Helmet Yes    Seat Belt Yes    Self-Exams No     MEDICATIONS:  Current Facility-Administered Medications   Medication Dose Route Frequency    predniSONE (DELTASONE) tablet 80 mg  80 mg Oral DAILY WITH BREAKFAST    bumetanide (BUMEX) injection 1 mg  1 mg IntraVENous Q12H    0.9% sodium chloride infusion  100 mL/hr IntraVENous CONTINUOUS    ondansetron (ZOFRAN) injection 4 mg  4 mg IntraVENous Q6H PRN    atorvastatin (LIPITOR) tablet 40 mg  40 mg Oral QHS    acetaminophen (TYLENOL) tablet 650 mg  650 mg Oral Q4H PRN    labetaloL (NORMODYNE;TRANDATE) 20 mg/4 mL (5 mg/mL) injection 5 mg  5 mg IntraVENous Q10MIN PRN    polyethylene glycol (MIRALAX) packet 17 g  17 g Oral DAILY PRN    famotidine (PEPCID) tablet 20 mg  20 mg Oral Q12H     Prior to Admission medications    Medication Sig Start Date End Date Taking? Authorizing Provider   predniSONE (DELTASONE) 20 mg tablet TAKE 4 TABLETS BY MOUTH DAILY. TAKE WITH FOOD TO PREVENT STOMACH UPSET 10/3/22   Other, MD Yaritza   bumetanide (BUMEX) 1 mg tablet Take 2 Tablets by mouth two (2) times a day for 30 days. 9/6/22 10/6/22  Cuba Mcnair MD   doxazosin (CARDURA) 4 mg tablet Take 1 Tablet by mouth nightly. Indications: enlarged prostate with urination problem, high blood pressure 9/6/22   Cuba Mcnair MD   magnesium oxide (MAG-OX) 400 mg tablet Take 400 mg by mouth nightly. Provider, Historical   cholestyramine light (QUESTRAN LITE) 4 gram packet Take 4 g by mouth once. Provider, Historical   desloratadine (CLARINEX) 5 mg tablet Take 5 mg by mouth nightly. Indications: ALLERGIC RHINITIS    Provider, Historical   METOPROLOL SUCCINATE PO Take 25 mg by mouth every evening.     Provider, Historical ferrous sulfate 325 mg (65 mg iron) tablet Take  by mouth Daily (before breakfast). Provider, Historical       ALLERGIES:  No Known Allergies    Review of Systems  GENERAL: No fevers or chills. HEENT: No change in vision, earache, tinnitus, sore throat or sinus congestion. NECK: No pain or stiffness. CARDIOVASCULAR: No chest pain or pressure. No palpitations. PULMONARY: No shortness of breath, cough or wheeze. GASTROINTESTINAL: No abdominal pain, nausea, vomiting or diarrhea. GENITOURINARY: No urinary frequency or dysuria. MUSCULOSKELETAL: No joint or muscle pain, no back pain, no recent trauma. DERMATOLOGIC: No rash, no itching, no lesions. ENDOCRINE: No heat or cold intolerance. HEMATOLOGICAL: No anemia or easy bruising or bleeding. NEUROLOGIC: No headache, seizures, numbness, tingling. Confusional state. PHYSICAL EXAMINATION:   Visit Vitals  BP (!) 109/59 (BP 1 Location: Left upper arm, BP Patient Position: Lying)   Pulse (!) 56   Temp 98.3 °F (36.8 °C)   Resp 16   Ht 6' (1.829 m)   Wt 123.3 kg (271 lb 13.2 oz)   SpO2 100%   BMI 36.87 kg/m²      O2 Device: None (Room air)  GENERAL: Pleasant, in no apparent distress. HEENT: Moist mucous membranes, sclerae anicteric, scalp is atraumatic. CVS: Regular rate and rhythm, no murmurs or gallops. No carotid bruits. PULMONARY: Clear to auscultation bilaterally. No rales or rhonchi. No wheezing. EXTREMITIES: Normal range of motion at all sites. No deformities. ABDOMEN: Soft, nontender. SKIN: No rashes. He has ecchymosis on upper extremities. NEUROLOGIC: Alert and oriented to self, hospital and October. Speech is fluent without any aphasia or dysarthria. Cranial nerves: Face is symmetric with symmetric smile. Facial sensation is intact. Extraocular movements are intact with no nystagmus. Visual fields are full to confrontation. PERRL. Tongue is midline. Palate elevates symmetrically. Hearing intact to speech. Sternocleidomastoid and trapezius strengths are full bilaterally. Motor: Normal tone and normal bulk on all four extremities. Strength is full on all four segmentally. There is no pronator drift or orbiting. Sensory: Intact to pinprick and touch on all four. Normal vibratory sensation on toes bilaterally. Coordination: Intact coordination with finger-nose-finger bilaterally. Normal fine movements. No bradykinesia detected. Deep tendon reflexes: Decreased at all sites. No  Gait assessment: Able to stand with a walker. Labs: Results:       Chemistry Recent Labs     10/07/22  0345 10/06/22  1645   * 243*    143   K 3.8 3.6   * 111   CO2 25 25   BUN 47* 48*   CREA 2.57* 2.78*   CA 8.4* 8.4*   AGAP 7 7   BUCR 18 17      CBC w/Diff Recent Labs     10/07/22  0345 10/06/22  1645   WBC 2.0* 1.8*   RBC 3.57* 3.59*   HGB 12.2* 11.9*   HCT 36.8 37.2   PLT 33* 35*   GRANS  --  81*   LYMPH  --  14*   EOS  --  0      Cardiac Enzymes No results for input(s): CPK, CKND1, CESAR in the last 72 hours. No lab exists for component: CKRMB, TROIP   Coagulation No results for input(s): PTP, INR, APTT, INREXT in the last 72 hours. Lipid Panel Lab Results   Component Value Date/Time    Cholesterol, total 102 10/07/2022 03:45 AM    HDL Cholesterol 42 10/07/2022 03:45 AM    LDL, calculated 47.6 10/07/2022 03:45 AM    VLDL, calculated 12.4 10/07/2022 03:45 AM    Triglyceride 62 10/07/2022 03:45 AM    CHOL/HDL Ratio 2.4 10/07/2022 03:45 AM      BNP No results for input(s): BNPP in the last 72 hours. Liver Enzymes No results for input(s): TP, ALB, TBIL, AP in the last 72 hours.     No lab exists for component: SGOT, GPT, DBIL   Thyroid Studies No results found for: T4, T3U, TSH, TSHEXT       Radiology:  CT HEAD WO CONT    Result Date: 10/6/2022  EXAMINATION:  CT head noncontrast COMPARISON: None HISTORY: Confusion TECHNIQUE: Noncontrasted axial images were obtained through the patient's brain from the vertex of the skull through the skull base. Bone and soft tissue windows were reviewed. One or more dose reduction techniques were used on this CT: automated exposure control, adjustment of the mAs and/or kVp according to patient size, and iterative reconstruction techniques. The specific techniques used on this CT exam have been documented in the patient's electronic medical record. Digital Imaging and Communications in Medicine (DICOM) format image data are available to nonaffiliated external healthcare facilities or entities on a secure, media free, reciprocally searchable basis with patient authorization for at least a 12-month period after this studyCamilo Olson FINDINGS: Brain architecture is normal. No mass effect, midline shift or hemorrhage. Ventricles are normal in size, position and configuration. No abnormal extra-axial fluid collections are seen. Scattered and confluent foci of decreased attenuation noted of the periventricular white matter, nonspecific, but most likely sequelae of chronic microvascular disease. No territorial signs of infarct. The bony calvarium appears intact without acute displaced fracture. The visualized paranasal sinuses and mastoid air cells are aerated. 1. No acute intracranial pathology. Above code S stroke alert results discussed with patient's ER provider 5:03 PM 10/6/2022. ASSESSMENT/IMPRESSION:  The clinical presentation is highly suggestive of toxic/metabolic encephalopathy in the setting of UTI. It would be reasonable to rule out a stroke with a brain MRI. RECOMMENDATIONS:  1. Brain MRI without contrast.  2.  Treatment of underlying toxic/metabolic etiologies. 3.  Further work-up may be indicated after brain MRI.     Please do not hesitate to return with any questions.    ------------------------------------  Tahir Corey MD  10/7/2022  1:08 PM

## 2022-10-07 NOTE — PROGRESS NOTES
Problem: Mobility Impaired (Adult and Pediatric)  Goal: *Acute Goals and Plan of Care (Insert Text)  Description: Physical Therapy Goals  Initiated 10/7/2022 and to be accomplished within 7 day(s)  1. Patient will move from supine to sit and sit to supine  in bed with supervision/set-up. 2.  Patient will transfer from bed to chair and chair to bed with supervision/set-up using the least restrictive device. 3.  Patient will perform sit to stand with supervision/set-up. 4.  Patient will ambulate with supervision/set-up for 150 feet with the least restrictive device. Note:   PHYSICAL THERAPY EVALUATION    Patient: Kimberly Verdugo (78 y.o. male)  Date: 10/7/2022  Primary Diagnosis: Altered mental status [R41.82]  Precautions:   Fall    ASSESSMENT :  Based on the objective data described below, the patient presents with lower extremity weakness, decreased gait quality and endurance, impaired bed mobility and transfers, and impaired balance. Pt alert and oriented this session. Wife present throughout. Pt performed supine to sit with CGA and extra time from [facility] elevated, sit to stand with Klaus. Patient ambulated 15 feet with RW, GB applied, Klaus. Pt performed transfer to/from bedside commode x3 and required assist for pericare. Pt tolerated session well as evidenced by no c/o increased pain, no lightheadedness or dizziness. Patient would benefit from skilled inpatient physical therapy to address deficits, progress as tolerated to achieve long term goals and allow safe discharge. Patient will benefit from skilled intervention to address the above impairments.   Patient's rehabilitation potential is considered to be Good  Factors which may influence rehabilitation potential include:   []         None noted  []         Mental ability/status  [x]         Medical condition  []         Home/family situation and support systems  []         Safety awareness  []         Pain tolerance/management  []         Other: PLAN :  Recommendations and Planned Interventions:   [x]           Bed Mobility Training             []    Neuromuscular Re-Education  [x]           Transfer Training                   []    Orthotic/Prosthetic Training  [x]           Gait Training                          []    Modalities  [x]           Therapeutic Exercises           []    Edema Management/Control  [x]           Therapeutic Activities            [x]    Family Training/Education  [x]           Patient Education  []           Other (comment):    Frequency/Duration: Patient will be followed by physical therapy 1-2 times per day/4-7 days per week to address goals. Discharge Recommendations: Macario Lewis pending progress  Further Equipment Recommendations for Discharge: N/A    AMPAC: 15/20    This AMPAC score should be considered in conjunction with interdisciplinary team recommendations to determine the most appropriate discharge setting. Patient's social support, diagnosis, medical stability, and prior level of function should also be taken into consideration. SUBJECTIVE:   Patient stated I feel better after having a bowel movement.     OBJECTIVE DATA SUMMARY:     Past Medical History:   Diagnosis Date    Bladder calculus 2015    Chronic kidney disease     H/O kidney stones    Crohn disease ([facility] [address]     Foot drop     left    Hypertension     Kidney calculi      Past Surgical History:   Procedure Laterality Date    HX BACK SURGERY      3 back surgery nola and screw    HX CATARACT REMOVAL Bilateral     HX GI      bowel [id number]    HX GI      hemorrhoidectomy    HX UROLOGICAL      kidney stone removal     Barriers to Learning/Limitations: yes;  altered mental status (i.e.Sedation, Confusion)  Compensate with: Visual Cues and Verbal Cues  Home Situation:  Home Situation  Home Environment: Private residence  # Steps to Enter: 0  One/Two Story Residence: One story  Living Alone: No  Support Systems: Spouse/Significant Other  Current DME Used/Available at Home: Walker, rolling  Critical Behavior:  Neurologic State: Alert  Orientation Level: Oriented to person;Oriented to place;Oriented to situation;Disoriented to time  Cognition: Follows commands  Safety/Judgement: Awareness of environment; Fall prevention  Psychosocial  Purposeful Interaction: Yes  Pt Identified Daily Priority: Clinical issues (comment)  Caritas Process: Nurture loving kindness;Establish trust;Teaching/learning; Attend basic human needs  Caring Interventions: Reassure; Therapeutic modalities  Reassure: Therapeutic listening; Informing;Support family; Sit with patient  Therapeutic Modalities: Intentional therapeutic touch;Humor  Strength:    Strength: Generally decreased, functional  Tone & Sensation:   Tone: Normal  Sensation: Impaired  Range Of Motion:  AROM: Generally decreased, functional  Functional Mobility:  Bed Mobility:  Rolling: Contact guard assistance;Minimum assistance  Supine to Sit: Contact guard assistance  Transfers:  Sit to Stand: Minimum assistance  Stand to Sit: Contact guard assistance  Balance:   Sitting: Intact  Standing: With support; Impaired  Standing - Static: Fair  Standing - Dynamic : Fair  Ambulation/Gait Training:  Distance (ft): 15 Feet (ft)  Assistive Device: Gait belt;Walker, rolling  Ambulation - Level of Assistance: Minimal assistance   Gait Description (WDL): Exceptions to WDL  Gait Abnormalities: Decreased step clearance; Foot drop (on L)  Base of Support: Widened   Speed/Sandra: Slow;Shuffled  Step Length: Right shortened;Left shortened  Swing Pattern: Left asymmetrical;Right asymmetrical  Pain:  Pain level pre-treatment: 0/10   Pain level post-treatment: 0/10     Activity Tolerance:   Good  Please refer to the flowsheet for vital signs taken during this treatment.   After treatment:   []         Patient left in no apparent distress sitting up in chair  [x]         Patient left in no apparent distress in bed  [x]         Call bell left within reach  [x]         Nursing notified  [x]         SLP present  [x]         Bed alarm activated  []         SCDs applied    COMMUNICATION/EDUCATION:   [x]         Role of Physical Therapy in the acute care setting. [x]         Fall prevention education was provided and the patient/caregiver indicated understanding. [x]         Patient/family have participated as able in goal setting and plan of care. [x]         Patient/family agree to work toward stated goals and plan of care. []         Patient understands intent and goals of therapy, but is neutral about his/her participation. []         Patient is unable to participate in goal setting/plan of care: ongoing with therapy staff.  []         Other: Thank you for this referral.  Kevin Thomas   Time Calculation: 48 mins      Eval Complexity: History: MEDIUM  Complexity : 1-2 comorbidities / personal factors will impact the outcome/ POC Exam:LOW Complexity : 1-2 Standardized tests and measures addressing body structure, function, activity limitation and / or participation in recreation  Presentation: LOW Complexity : Stable, uncomplicated  Clinical Decision Making:Low Complexity    Overall Complexity:LOW     [address] [facility] [address] [address] AM-PAC® Basic Mobility Inpatient Short Form (6-Clicks) Version 2    How much HELP from another person does the patient currently need    (If the patient hasn't done an activity recently, how much help from another person do you think he/she would need if he/she tried?)   Total (Total A or Dep)   A Lot  (Mod to Max A)   A Little (Sup or Min A)   None (Mod I to I)   Turning from your back to your side while in a flat bed without using bedrails? [] 1 [] 2 [x] 3 [] 4   2. Moving from lying on your back to sitting on the side of a flat bed without using bedrails? [] 1 [] 2 [x] 3 [] 4   3. Moving to and from a bed to a chair (including a wheelchair)? [] 1 [] 2 [x] 3 [] 4   4.  Standing up from a chair using your arms (e.g., wheelchair, or bedside chair)? [] 1 [] 2 [x] 3 [] 4   5. Walking in hospital room? [] 1 [] 2 [x] 3 [] 4   6. Climbing 3-5 steps with a railing?+   [] 1 [] 2 [] 3 [] 4   +If stair climbing cannot be assessed, skip item #6. Sum responses from items 1-5. Based on an AM-PAC score of 15/20 and their current functional mobility deficits, it is recommended that the patient have 3-5 sessions per week of Physical Therapy at d/c to increase the patient's independence.

## 2022-10-07 NOTE — DISCHARGE SUMMARY
Discharge Summary    Patient: Octaviano Salgado. MRN: [id number]  CSN: [id number]    YOB: 1940  Age: 78 y.o. Sex: male    DOA: 10/6/2022 LOS:  LOS: 1 day   Discharge Date:      Primary Care Provider:  Сергей Lemos NP    Admission Diagnoses: Altered mental status [R41.82]    Discharge Diagnoses:    Problem List as of 10/7/2022 Date Reviewed: 6/21/2018            Codes Class Noted - Resolved    * (Principal) Acute metabolic encephalopathy BHG-20-BB: G93.41  ICD-9-CM: 348.31  10/7/2022 - Present        Thrombocytopenia ([facility] ICD-10-CM: D69.6  ICD-9-CM: 287.5  10/7/2022 - Present        Aortic stenosis ICD-10-CM: I35.0  ICD-9-CM: 424.1  10/7/2022 - Present        CKD (chronic kidney disease) ICD-10-CM: N18.9  ICD-9-CM: 585.9  10/7/2022 - Present        Acute kidney injury superimposed on chronic kidney disease ([facility] ICD-10-CM: N17.9, N18.9  ICD-9-CM: 584.9, 585.9  10/7/2022 - Present        Altered mental status ICD-10-CM: R41.82  ICD-9-CM: 780.97  10/6/2022 - Present           Discharge Medications:     Current Discharge Medication List        CONTINUE these medications which have CHANGED    Details   bumetanide (BUMEX) 1 mg tablet Take 2 Tablets by mouth two (2) times a day for 30 days. Qty: 1 Tablet, Refills: 0  Start date: 10/7/2022, End date: 11/6/2022           CONTINUE these medications which have NOT CHANGED    Details   predniSONE (DELTASONE) 20 mg tablet TAKE 4 TABLETS BY MOUTH DAILY. TAKE WITH FOOD TO PREVENT STOMACH UPSET      doxazosin (CARDURA) 4 mg tablet Take 1 Tablet by mouth nightly. Indications: enlarged prostate with urination problem, high blood pressure  Qty: 20 Tablet, Refills: 0      magnesium oxide (MAG-OX) 400 mg tablet Take 400 mg by mouth nightly. cholestyramine light (QUESTRAN LITE) 4 gram packet Take 4 g by mouth once. desloratadine (CLARINEX) 5 mg tablet Take 5 mg by mouth nightly.  Indications: ALLERGIC RHINITIS      METOPROLOL SUCCINATE PO Take 25 mg by mouth every evening. ferrous sulfate 325 mg (65 mg iron) tablet Take  by mouth Daily (before breakfast). Discharge Condition: Stable    Procedures : none    Consults: Neurology      PHYSICAL EXAM   Visit Vitals  /62 (BP 1 Location: Left upper arm, BP Patient Position: Lying)   Pulse 61   Temp 97.7 °F (36.5 °C)   Resp 16   Ht 6' (1.829 m)   Wt 122.9 kg (271 lb)   SpO2 97%   BMI 36.75 kg/m²     General: Awake, cooperative, no acute distress    HEENT: NC, Atraumatic. PERRLA, EOMI. Anicteric sclerae. Lungs:  CTA Bilaterally. No Wheezing/Rhonchi/Rales. Heart:  Regular  rhythm,  No murmur, No Rubs, No Gallops  Abdomen: Soft, Non distended, Non tender. +Bowel sounds,   Extremities: No c/c/e  Psych:   Not anxious or agitated. Neurologic:  No acute neurological deficits. Admission HPI : Stevan Jain is a 78 y.o. male with a h/o Hypertension, Crohn's, CKD and aortoc stenosis who presents to the ER with confusion. As per family, patient started having episodes of confusion 7 days ago, went to his hematologist and was diagnosed with a UTI for which he was prescribed Bactrim. Patient completed his course of medications, as per family , his confusion improved for a few days and then yesterday evening started having gradually worsening confusion again. Today he went to his primary care provider where a UA was repeated, urine noted to be clear of infection. At that time his primary care provider suggested he come to the ER for evaluation for his altered mental status. As per patient's family members, patient also started prednisone approximately 13 days ago for thrombocytopenia. No recent trauma/falls, no other medical complaints. Hospital Course :   Stevan Jain is a 78 y.o. male with a h/o Hypertension, Crohn's, CKD and aortoc stenosis who presents to the ER with confusion.   Admitted for acute metabolic encephalopathy and stroke evaluation    Toxic/metabolic encephalopathy -  Possibly related to recent UTI  Has waxing and waning characteristics   Improving mental status   No stroke found, MRI brain negative   Appreciate neurology expertise -Dr. Neri Host      CT scan head unremarkable  CT angiogram of brain and neck unremarkable     Thrombocytopenia-  Platelet count 02,230  Followed  by hematology as outpatient  Resume home dose of prednisone 80 mg daily  Daily CBC  Monitor for signs of bleeding     History of aortic stenosis-  Followed by Dr. Shaun Vega as outpatient  Bumex, home medication     NAPOLEON superimposed on CKD-  Improving      History of Crohn's disease-  Supportive care      Activity: Activity as tolerated    Diet: Regular Diet    Follow-up: keep scheduled medical appointments     Disposition: home with wife    Minutes spent on discharge: 35 minutes       Labs: Results:       Chemistry Recent Labs     10/07/22  0345 10/06/22  1645   * 243*    143   K 3.8 3.6   * 111   CO2 25 25   BUN 47* 48*   CREA 2.57* 2.78*   CA 8.4* 8.4*   AGAP 7 7   BUCR 18 17      CBC w/Diff Recent Labs     10/07/22  0345 10/06/22  1645   WBC 2.0* 1.8*   RBC 3.57* 3.59*   HGB 12.2* 11.9*   HCT 36.8 37.2   PLT 33* 35*   GRANS  --  81*   LYMPH  --  14*   EOS  --  0      Cardiac Enzymes No results for input(s): CPK, CKND1, CESAR in the last 72 hours. No lab exists for component: CKRMB, TROIP   Coagulation No results for input(s): PTP, INR, APTT, INREXT, INREXT in the last 72 hours. Lipid Panel Lab Results   Component Value Date/Time    Cholesterol, total 102 10/07/2022 03:45 AM    HDL Cholesterol 42 10/07/2022 03:45 AM    LDL, calculated 47.6 10/07/2022 03:45 AM    VLDL, calculated 12.4 10/07/2022 03:45 AM    Triglyceride 62 10/07/2022 03:45 AM    CHOL/HDL Ratio 2.4 10/07/2022 03:45 AM      BNP No results for input(s): BNPP in the last 72 hours. Liver Enzymes No results for input(s): TP, ALB, TBIL, AP in the last 72 hours.     No lab exists for component: SGOT, GPT, DBIL   Thyroid Studies No results found for: T4, T3U, TSH, TSHEXT, TSHEXT         Significant Diagnostic Studies: MRI BRAIN WO CONT    Result Date: 10/7/2022  EXAM: MRI brain without contrast 10/7/2022. CLINICAL INDICATION/HISTORY: Confusion. COMPARISON: CT scan of the head from 10/6/2022 TECHNIQUE: Multiplanar multisequential images of the brain were obtained without contrast. _______________ FINDINGS: BRAIN AND POSTERIOR FOSSA: Mild to moderate atrophy and moderate chronic small vessel changes are present throughout the periventricular and subcortical white matter of both cerebral hemispheres. There is no intracranial hemorrhage, mass effect, or midline shift. There are no significant additional areas of abnormal parenchymal signal. There are no areas of restricted diffusion to suggest acute infarct. EXTRA-AXIAL SPACES AND MENINGES: There are no abnormal extra-axial fluid collections. Atiya Rast VASCULAR: The visualized portions of the intracranial carotid and vertebrobasilar systems demonstrate patent appearing flow voids. CALVARIA: Unremarkable. SINUSES: Clear, as visualized. CRANIOCERVICAL JUNCTION: Within normal limits for age. OTHER: None. _______________     1. Mild to moderate atrophy and moderate chronic small vessel changes. 2.  Otherwise, unremarkable evaluation. Specifically, no acute intracranial abnormalities are identified. CT HEAD WO CONT    Result Date: 10/6/2022  EXAMINATION:  CT head noncontrast COMPARISON: None HISTORY: Confusion TECHNIQUE: Noncontrasted axial images were obtained through the patient's brain from the vertex of the skull through the skull base. Bone and soft tissue windows were reviewed. One or more dose reduction techniques were used on this CT: automated exposure control, adjustment of the mAs and/or kVp according to patient size, and iterative reconstruction techniques.   The specific techniques used on this CT exam have been documented in the patient's electronic medical record. Digital Imaging and Communications in Medicine (DICOM) format image data are available to nonaffiliated external healthcare facilities or entities on a secure, media free, reciprocally searchable basis with patient authorization for at least a 12-month period after this study. Anirudh Amend FINDINGS: Brain architecture is normal. No mass effect, midline shift or hemorrhage. Ventricles are normal in size, position and configuration. No abnormal extra-axial fluid collections are seen. Scattered and confluent foci of decreased attenuation noted of the periventricular white matter, nonspecific, but most likely sequelae of chronic microvascular disease. No territorial signs of infarct. The bony calvarium appears intact without acute displaced fracture. The visualized paranasal sinuses and mastoid air cells are aerated. 1. No acute intracranial pathology. Above code S stroke alert results discussed with patient's ER provider 5:03 PM 10/6/2022. ECHO ADULT COMPLETE    Result Date: 10/7/2022    Left Ventricle: Normal left ventricular systolic function with a visually estimated EF of 60 - 65%. Left ventricle size is normal. Mildly increased wall thickness. Findings consistent with mild concentric hypertrophy. Normal wall motion. Grade I diastolic dysfunction present with normal LV EF. Left Atrium: Left atrium is mildly dilated. Aortic Valve: Tricuspid valve. Mildly calcified cusp. Mild regurgitation. Moderate stenosis of the aortic valve. Tricuspid Valve: Unable to assess RVSP due to inadequate or insignificant tricuspid regurgitation. Interatrial Septum: No interatrial shunt visualized with color Doppler. Suboptimal bubble study. Agitated saline study was negative with and without provocation. No results found for this or any previous visit.         CC: Joann Celeste NP

## 2022-10-07 NOTE — PROGRESS NOTES
Problem: Dysphagia (Adult)  Goal: *Acute Goals and Plan of Care (Insert Text)  Description: Patient will:  1. Tolerate PO trials with 0 s/s overt distress in 4/5 trials. 2. Utilize compensatory swallow strategies/maneuvers (decrease bite/sip, size/rate, alt. liq/sol) with min cues in 4/5 trials. 3. Perform oral-motor/laryngeal exercises to increase oropharyngeal swallow function with min cues.     Rec:     Regular, thin liquid diet  Pt may require assistance with meal set up  Aspiration precautions  HOB >45 during po intake, remain >30 for 30-45 minutes after po   Small bites/sips; alternate liquid/solid with slow feeding rate   Oral care TID  Meds per pt preference   Outcome: Progressing Towards Goal

## 2022-10-07 NOTE — PROGRESS NOTES
0845 Report received from [address] [facility], [address]. [phone] Attempted to reach pt spouse for screening questions for MRI. No answer. Left voice message.     1100 Screening form completed

## 2022-10-07 NOTE — PROGRESS NOTES
Problem: TIA/CVA Stroke: 0-24 hours  Goal: Nutrition/Diet  Outcome: Progressing Towards Goal     Problem: TIA/CVA Stroke: 0-24 hours  Goal: Medications  Outcome: Progressing Towards Goal     Problem: TIA/CVA Stroke: 0-24 hours  Goal: *Absence of Signs of Aspiration on Current Diet  Outcome: Progressing Towards Goal     Problem: TIA/CVA Stroke: 0-24 hours  Goal: *Stroke education started(Stroke Metric)  Outcome: Progressing Towards Goal     Problem: TIA/CVA Stroke: 0-24 hours  Goal: *Dysphagia screen performed(Stroke Metric)  Outcome: Progressing Towards Goal

## 2022-10-07 NOTE — DIABETES MGMT
Diabetes/ Glycemic Control Plan of Care  Recommendations: While receiving steroids recommend IP subcutaneous insulin orderset to include:   POC glucose monitoring (ACHS with active diet order or Q6 hours while NPO)   Corrective Humalog - normal sensitivity scale (ACHS with active diet order or Q6 hours while NPO)    Assessment: Patient has no known history of diabetes. HgbA1c resulted this admission at 5.6%. Upon admission,  mg/dl and lab BG this morning also elevated at 181 mg/dl. No insulin ordered at this time. Steroids initiated today. Patient Active Problem List   Diagnosis Code    Altered mental status N11.84    Acute metabolic encephalopathy L42.01    Thrombocytopenia (HCC) D69.6    Aortic stenosis I35.0    CKD (chronic kidney disease) N18.9     Steroids:   Rx Glucocorticoids (24h ago, onward)       Start     Dose Route Frequency Ordered Stop    10/07/22 0800  predniSONE (DELTASONE) tablet 80 mg         80 mg PO DAILY WITH BREAKFAST 10/07/22 0042 --                   Recent Glucose Results:   Lab Results   Component Value Date/Time     (H) 10/07/2022 03:45 AM     (H) 10/06/2022 04:45 PM    GLUCPOC 232 (H) 10/06/2022 04:44 PM        BG within target range (non-ICU: <180; -180):  [] Yes    [x] No   Current insulin orders: none  Total Daily Dose previous 24 hours = none  Current A1c:   Lab Results   Component Value Date/Time    Hemoglobin A1c 5.6 10/07/2022 03:45 AM      equivalent  to ave Blood Glucose of 114 mg/dl for 2-3 months prior to admission  Adequate glycemic control PTA: [x] Yes    [] No   Nutrition/Diet: ADULT DIET Regular; Low Fat/Low Chol/High Fiber/MATTHEW     Home diabetes medications:   Key Antihyperglycemic Medications       Patient is on no antihyperglycemic meds.           Plan/Goals:   Blood glucose will be within target of 70 - 180 mg/dl within 72 hours    Education:  [] Refer to Diabetes Education Record                       [x] Education not indicated at this time     Marta Copeland RD  Glycemic Control Team  [phone]    Monday-Friday   9 am - 3 pm

## 2022-10-07 NOTE — PROGRESS NOTES
Problem: Falls - Risk of  Goal: *Absence of Falls  Description: Document Sofia Fothergill Fall Risk and appropriate interventions in the flowsheet.   Outcome: Progressing Towards Goal  Note: Fall Risk Interventions:  Mobility Interventions: Bed/chair exit alarm         Medication Interventions: Patient to call before getting OOB                   Problem: TIA/CVA Stroke: 0-24 hours  Goal: Off Pathway (Use only if patient is Off Pathway)  Outcome: Progressing Towards Goal  Goal: Activity/Safety  Outcome: Progressing Towards Goal  Goal: Consults, if ordered  Outcome: Progressing Towards Goal  Goal: Diagnostic Test/Procedures  Outcome: Progressing Towards Goal  Goal: Nutrition/Diet  Outcome: Progressing Towards Goal  Goal: Discharge Planning  Outcome: Progressing Towards Goal  Goal: Medications  Outcome: Progressing Towards Goal  Goal: Respiratory  Outcome: Progressing Towards Goal  Goal: Treatments/Interventions/Procedures  Outcome: Progressing Towards Goal  Goal: Minimize risk of bleeding post-thrombolytic infusion  Outcome: Progressing Towards Goal  Goal: Monitor for complications post-thrombolytic infusion  Outcome: Progressing Towards Goal  Goal: Psychosocial  Outcome: Progressing Towards Goal  Goal: *Hemodynamically stable  Outcome: Progressing Towards Goal  Goal: *Neurologically stable  Description: Absence of additional neurological deficits    Outcome: Progressing Towards Goal  Goal: *Verbalizes anxiety and depression are reduced or absent  Outcome: Progressing Towards Goal  Goal: *Absence of Signs of Aspiration on Current Diet  Outcome: Progressing Towards Goal  Goal: *Absence of deep venous thrombosis signs and symptoms(Stroke Metric)  Outcome: Progressing Towards Goal  Goal: *Ability to perform ADLs and demonstrates progressive mobility and function  Outcome: Progressing Towards Goal  Goal: *Stroke education started(Stroke Metric)  Outcome: Progressing Towards Goal  Goal: *Dysphagia screen performed(Stroke Metric)  Outcome: Progressing Towards Goal

## 2022-10-07 NOTE — PROGRESS NOTES
SCD's placed on patient and education given per stroke protocol. Stroke education book given to patient. Patient stating he'll look at it later. NIHSS complete. All needs met at this time.

## 2022-10-07 NOTE — PROGRESS NOTES
Problem: Dysphagia (Adult)  Description: Patient will:  1. Tolerate PO trials with 0 s/s overt distress in 4/5 trials. 2. Utilize compensatory swallow strategies/maneuvers (decrease bite/sip, size/rate, alt. liq/sol) with min cues in 4/5 trials. 3. Perform oral-motor/laryngeal exercises to increase oropharyngeal swallow function with min cues. Rec:     Regular, thin liquid diet  Pt may require assistance with meal set up  Aspiration precautions  HOB >45 during po intake, remain >30 for 30-45 minutes after po   Small bites/sips; alternate liquid/solid with slow feeding rate   Oral care TID  Meds per pt preference   10/7/2022 1454 by Lj Bazzi, SLP  Outcome: Progressing Towards Goal      [address]   EVALUATION & TREATMENT     Patient: Kezia Lacy. (78 y.o. male)  Date: 10/7/2022  Primary Diagnosis: Altered mental status [R41.82]       Precautions: Aspiration, Fall     PLOF: Regular, thin    ASSESSMENT :  Based on the objective data described below, the patient presents with mild oropharyngeal dysphagia. Patient seen for bedside swallow evaluation per MD orders following admission for AMS. Patient seated upright at EOB following PT session. Patient is A/Ox3. Able to identify day of week when provided with verbal cue (yesterday was Thursday, today is _). OM examination significant for mildly reduced lingual strength; limited dentition. Patient reports that he is due for dental work. Wet vocal noted at baseline; resolved with hard throat clear and dry swallow. PO trials of thin, puree, and solid textures given. Patient demo single/ consecutive sips of thin liquid via straw. Wet vocal quality noted intermittently during thin liquid trials. Patient/spouse cued to listen for vocal quality and perform throat clear/dry swallow when noted. Patient tolerated puree and solid textures with no overt s/sx of aspiration or distress.  Rec patient remain on regular, thin liquid diet with safe swallowing guidelines/aspiration precautions. Education provided to patient/spouse about importance of slowing rate, taking small bites/sips, clearing throat/performing dry swallow when voice sounds wet. Patient also encouraged to stay upright 20 - 30 minutes after PO. Patient/spouse verbalized understanding. Patient required assistance with getting back into bed after [address] following; d/w nurse Sharita Lee. TREATMENT :  Skilled therapy initiated; Educated pt on aspiration precautions and importance of compensatory swallow techniques to decrease aspiration risk (decrease rate of intake & sip/bite size, upright @HOB for all po intake and ~30 minutes after po); verbalized comprehension. SLP to follow as indicated. Patient will benefit from skilled intervention to address the above impairments. Patient's rehabilitation potential is considered to be Fair  Factors which may influence rehabilitation potential include:   []            None noted  [x]            Mental ability/status  [x]            Medical condition  []            Home/family situation and support systems  []            Safety awareness  []            Pain tolerance/management  []            Other:      PLAN :  Recommendations and Planned Interventions:  See above  Frequency/Duration: Patient will be followed by speech-language pathology 3 times a week to address goals. Discharge Recommendations: To Be Determined     SUBJECTIVE:   Patient stated We have been  61 years.     OBJECTIVE:     Past Medical History:   Diagnosis Date    Bladder calculus 2015    Chronic kidney disease     H/O kidney stones    Crohn disease ([address]     Foot drop     left    Hypertension     Kidney calculi      Past Surgical History:   Procedure Laterality Date    HX BACK SURGERY      3 back surgery nola and screw    HX CATARACT REMOVAL Bilateral     HX GI      bowel [id number]    HX GI      hemorrhoidectomy    HX UROLOGICAL      kidney stone removal     Home Situation: did not assess     Diet prior to admission: Regular, thin  Current Diet:  Regular, thin     Cognitive and Communication Status:  Neurologic State: Alert  Orientation Level: Oriented to person, Oriented to place, Oriented to situation, Disoriented to time  Cognition: Follows commands  Perception: Appears intact  Perseveration: No perseveration noted  Safety/Judgement: Awareness of environment, Fall prevention    Oral Assessment:  Oral Assessment  Labial: No impairment  Dentition: Natural;Limited  Oral Hygiene:  (fair)  Lingual: Decreased strength  Velum: No impairment  Mandible: No impairment  Gag Reflex:  (did not test)    P.O. Trials:  Patient Position:  (upright EOB)  Vocal quality prior to P.O.: Wet  Consistency Presented: Thin liquid; Solid;Puree  How Presented: Self-fed/presented;SLP-fed/presented;Spoon;Straw;Successive swallows     Bolus Acceptance: No impairment  Bolus Formation/Control: Impaired  Type of Impairment: Piecemeal;Other (comment) (suspect premature spillage)  Propulsion: No impairment  Oral Residue: Lingual;Less than 10% of bolus  Initiation of Swallow: No impairment  Laryngeal Elevation: Functional  Aspiration Signs/Symptoms: Change vocal quality  Pharyngeal Phase Characteristics: Easily fatigued ; Suspected pharyngeal residue  Effective Modifications: Alternate liquids/solids; Double swallow;Small sips and bites  Cues for Modifications: Minimal-moderate     Oral Phase Severity: Mild  Pharyngeal Phase Severity : Mild    PAIN:  Pain level pre-treatment: 0/10   Pain level post-treatment: 0/10     After treatment:   []            Patient left in no apparent distress sitting up in chair  [x]            Patient left in no apparent distress in bed  [x]            Call bell left within reach  [x]            Nursing notified  []            Family present  []            Caregiver present  []            Bed alarm activated    COMMUNICATION/EDUCATION:   [x]            Aspiration precautions; swallow safety; compensatory techniques. []            Patient/family have participated as able in goal setting and plan of care. [x]            Patient/family agree to work toward stated goals and plan of care. []            Patient understands intent and goals of therapy; neutral about participation. []            Patient unable to participate in goal setting/plan of care; educ ongoing with interdisciplinary staff  []         Posted safety precautions in patient's room.     Thank you for this referral.  SOFIA Engel  Time Calculation: 23 mins  Evaluation Time: 15 minutes   Treatment Time: 8 minutes

## 2022-10-12 NOTE — ADT AUTH CERT NOTES
Significant Diagnostic Studies: MRI BRAIN WO CONT     Result Date: 10/7/2022  EXAM: MRI brain without contrast 10/7/2022. CLINICAL INDICATION/HISTORY: Confusion. COMPARISON: CT scan of the head from 10/6/2022 TECHNIQUE: Multiplanar multisequential images of the brain were obtained without contrast. _______________ FINDINGS: BRAIN AND POSTERIOR FOSSA: Mild to moderate atrophy and moderate chronic small vessel changes are present throughout the periventricular and subcortical white matter of both cerebral hemispheres. There is no intracranial hemorrhage, mass effect, or midline shift. There are no significant additional areas of abnormal parenchymal signal. There are no areas of restricted diffusion to suggest acute infarct. EXTRA-AXIAL SPACES AND MENINGES: There are no abnormal extra-axial fluid collections. Merlyttjamel ParkerHema VASCULAR: The visualized portions of the intracranial carotid and vertebrobasilar systems demonstrate patent appearing flow voids. CALVARIA: Unremarkable. SINUSES: Clear, as visualized. CRANIOCERVICAL JUNCTION: Within normal limits for age. OTHER: None. _______________      1. Mild to moderate atrophy and moderate chronic small vessel changes. 2.  Otherwise, unremarkable evaluation. Specifically, no acute intracranial abnormalities are identified. CT HEAD WO CONT     Result Date: 10/6/2022  EXAMINATION:  CT head noncontrast COMPARISON: None HISTORY: Confusion TECHNIQUE: Noncontrasted axial images were obtained through the patient's brain from the vertex of the skull through the skull base. Bone and soft tissue windows were reviewed. One or more dose reduction techniques were used on this CT: automated exposure control, adjustment of the mAs and/or kVp according to patient size, and iterative reconstruction techniques. The specific techniques used on this CT exam have been documented in the patient's electronic medical record.   Digital Imaging and Communications in Medicine (DICOM) format image data are available to nonaffiliated external healthcare facilities or entities on a secure, media free, reciprocally searchable basis with patient authorization for at least a 12-month period after this study. Atiya Luz FINDINGS: Brain architecture is normal. No mass effect, midline shift or hemorrhage. Ventricles are normal in size, position and configuration. No abnormal extra-axial fluid collections are seen. Scattered and confluent foci of decreased attenuation noted of the periventricular white matter, nonspecific, but most likely sequelae of chronic microvascular disease. No territorial signs of infarct. The bony calvarium appears intact without acute displaced fracture. The visualized paranasal sinuses and mastoid air cells are aerated. 1. No acute intracranial pathology. Above code S stroke alert results discussed with patient's ER provider 5:03 PM 10/6/2022. ECHO ADULT COMPLETE     Result Date: 10/7/2022    Left Ventricle: Normal left ventricular systolic function with a visually estimated EF of 60 - 65%. Left ventricle size is normal. Mildly increased wall thickness. Findings consistent with mild concentric hypertrophy. Normal wall motion. Grade I diastolic dysfunction present with normal LV EF. Left Atrium: Left atrium is mildly dilated. Aortic Valve: Tricuspid valve. Mildly calcified cusp. Mild regurgitation. Moderate stenosis of the aortic valve. Tricuspid Valve: Unable to assess RVSP due to inadequate or insignificant tricuspid regurgitation. Interatrial Septum: No interatrial shunt visualized with color Doppler. Suboptimal bubble study. Agitated saline study was negative with and without provocation. No results found for this or any previous visit.            CC: Rex Mejia NP

## 2022-11-12 ENCOUNTER — HOSPITAL ENCOUNTER (INPATIENT)
Age: 82
LOS: 15 days | Discharge: HOME | DRG: 682 | End: 2022-11-27
Attending: EMERGENCY MEDICINE | Admitting: INTERNAL MEDICINE
Payer: MEDICARE

## 2022-11-12 ENCOUNTER — APPOINTMENT (OUTPATIENT)
Dept: GENERAL RADIOLOGY | Age: 82
DRG: 682 | End: 2022-11-12
Attending: EMERGENCY MEDICINE
Payer: MEDICARE

## 2022-11-12 DIAGNOSIS — I35.0 AORTIC VALVE STENOSIS, ETIOLOGY OF CARDIAC VALVE DISEASE UNSPECIFIED: Primary | ICD-10-CM

## 2022-11-12 DIAGNOSIS — R60.9 PERIPHERAL EDEMA: ICD-10-CM

## 2022-11-12 DIAGNOSIS — D64.9 ANEMIA, UNSPECIFIED TYPE: ICD-10-CM

## 2022-11-12 DIAGNOSIS — R18.8 CIRRHOSIS OF LIVER WITH ASCITES, UNSPECIFIED HEPATIC CIRRHOSIS TYPE (HCC): ICD-10-CM

## 2022-11-12 DIAGNOSIS — N17.9 AKI (ACUTE KIDNEY INJURY) (HCC): ICD-10-CM

## 2022-11-12 DIAGNOSIS — R60.0 BILATERAL LOWER EXTREMITY EDEMA: ICD-10-CM

## 2022-11-12 DIAGNOSIS — D69.6 THROMBOCYTOPENIA (HCC): ICD-10-CM

## 2022-11-12 DIAGNOSIS — R09.02 HYPOXIA: ICD-10-CM

## 2022-11-12 DIAGNOSIS — R06.02 SHORTNESS OF BREATH: ICD-10-CM

## 2022-11-12 DIAGNOSIS — K74.60 CIRRHOSIS OF LIVER WITH ASCITES, UNSPECIFIED HEPATIC CIRRHOSIS TYPE (HCC): ICD-10-CM

## 2022-11-12 DIAGNOSIS — R18.8 OTHER ASCITES: ICD-10-CM

## 2022-11-12 LAB
ALBUMIN SERPL-MCNC: 2.4 G/DL (ref 3.4–5)
ALBUMIN/GLOB SERPL: 0.8 {RATIO} (ref 0.8–1.7)
ALP SERPL-CCNC: 96 U/L (ref 45–117)
ALT SERPL-CCNC: 38 U/L (ref 16–61)
ANION GAP SERPL CALC-SCNC: 12 MMOL/L (ref 3–18)
APPEARANCE UR: CLEAR
AST SERPL-CCNC: 27 U/L (ref 10–38)
BACTERIA URNS QL MICRO: ABNORMAL /HPF
BASOPHILS # BLD: 0 K/UL (ref 0–0.1)
BASOPHILS NFR BLD: 0 % (ref 0–2)
BILIRUB SERPL-MCNC: 1.9 MG/DL (ref 0.2–1)
BILIRUB UR QL: NEGATIVE
BNP SERPL-MCNC: 4350 PG/ML (ref 0–1800)
BUN SERPL-MCNC: 87 MG/DL (ref 7–18)
BUN/CREAT SERPL: 15 (ref 12–20)
CALCIUM SERPL-MCNC: 7.9 MG/DL (ref 8.5–10.1)
CHLORIDE SERPL-SCNC: 107 MMOL/L (ref 100–111)
CO2 SERPL-SCNC: 20 MMOL/L (ref 21–32)
COLOR UR: YELLOW
CREAT SERPL-MCNC: 5.62 MG/DL (ref 0.6–1.3)
DIFFERENTIAL METHOD BLD: ABNORMAL
EOSINOPHIL # BLD: 0 K/UL (ref 0–0.4)
EOSINOPHIL NFR BLD: 0 % (ref 0–5)
EPITH CASTS URNS QL MICRO: NEGATIVE /LPF (ref 0–5)
ERYTHROCYTE [DISTWIDTH] IN BLOOD BY AUTOMATED COUNT: 14.6 % (ref 11.6–14.5)
GLOBULIN SER CALC-MCNC: 3.2 G/DL (ref 2–4)
GLUCOSE SERPL-MCNC: 159 MG/DL (ref 74–99)
GLUCOSE UR STRIP.AUTO-MCNC: NEGATIVE MG/DL
HCT VFR BLD AUTO: 31.8 % (ref 36–48)
HGB BLD-MCNC: 11 G/DL (ref 13–16)
HGB UR QL STRIP: ABNORMAL
IMM GRANULOCYTES # BLD AUTO: 0 K/UL (ref 0–0.04)
IMM GRANULOCYTES NFR BLD AUTO: 0 % (ref 0–0.5)
KETONES UR QL STRIP.AUTO: NEGATIVE MG/DL
LEUKOCYTE ESTERASE UR QL STRIP.AUTO: ABNORMAL
LYMPHOCYTES # BLD: 0.4 K/UL (ref 0.9–3.6)
LYMPHOCYTES NFR BLD: 14 % (ref 21–52)
MAGNESIUM SERPL-MCNC: 2 MG/DL (ref 1.6–2.6)
MCH RBC QN AUTO: 35.3 PG (ref 24–34)
MCHC RBC AUTO-ENTMCNC: 34.6 G/DL (ref 31–37)
MCV RBC AUTO: 101.9 FL (ref 78–100)
MONOCYTES # BLD: 0.3 K/UL (ref 0.05–1.2)
MONOCYTES NFR BLD: 11 % (ref 3–10)
NEUTS SEG # BLD: 1.9 K/UL (ref 1.8–8)
NEUTS SEG NFR BLD: 74 % (ref 40–73)
NITRITE UR QL STRIP.AUTO: NEGATIVE
NRBC # BLD: 0 K/UL (ref 0–0.01)
NRBC BLD-RTO: 0 PER 100 WBC
PH UR STRIP: 5 [PH] (ref 5–8)
PLATELET # BLD AUTO: 65 K/UL (ref 135–420)
PMV BLD AUTO: 11.5 FL (ref 9.2–11.8)
POTASSIUM SERPL-SCNC: 3.6 MMOL/L (ref 3.5–5.5)
PROT SERPL-MCNC: 5.6 G/DL (ref 6.4–8.2)
PROT UR STRIP-MCNC: NEGATIVE MG/DL
RBC # BLD AUTO: 3.12 M/UL (ref 4.35–5.65)
RBC #/AREA URNS HPF: ABNORMAL /HPF (ref 0–5)
SODIUM SERPL-SCNC: 139 MMOL/L (ref 136–145)
SP GR UR REFRACTOMETRY: 1.01 (ref 1–1.03)
TROPONIN-HIGH SENSITIVITY: 175 NG/L (ref 0–78)
UROBILINOGEN UR QL STRIP.AUTO: 0.2 EU/DL (ref 0.2–1)
WBC # BLD AUTO: 2.5 K/UL (ref 4.6–13.2)
WBC URNS QL MICRO: ABNORMAL /HPF (ref 0–5)

## 2022-11-12 PROCEDURE — 71045 X-RAY EXAM CHEST 1 VIEW: CPT

## 2022-11-12 PROCEDURE — 93005 ELECTROCARDIOGRAM TRACING: CPT

## 2022-11-12 PROCEDURE — 83735 ASSAY OF MAGNESIUM: CPT

## 2022-11-12 PROCEDURE — 74011250637 HC RX REV CODE- 250/637: Performed by: EMERGENCY MEDICINE

## 2022-11-12 PROCEDURE — 65270000046 HC RM TELEMETRY

## 2022-11-12 PROCEDURE — 80053 COMPREHEN METABOLIC PANEL: CPT

## 2022-11-12 PROCEDURE — 83880 ASSAY OF NATRIURETIC PEPTIDE: CPT

## 2022-11-12 PROCEDURE — 99285 EMERGENCY DEPT VISIT HI MDM: CPT

## 2022-11-12 PROCEDURE — 96374 THER/PROPH/DIAG INJ IV PUSH: CPT

## 2022-11-12 PROCEDURE — 85025 COMPLETE CBC W/AUTO DIFF WBC: CPT

## 2022-11-12 PROCEDURE — 81001 URINALYSIS AUTO W/SCOPE: CPT

## 2022-11-12 PROCEDURE — 65270000029 HC RM PRIVATE

## 2022-11-12 PROCEDURE — 84484 ASSAY OF TROPONIN QUANT: CPT

## 2022-11-12 PROCEDURE — 74011250636 HC RX REV CODE- 250/636: Performed by: EMERGENCY MEDICINE

## 2022-11-12 RX ORDER — METOLAZONE 5 MG/1
10 TABLET ORAL
Status: COMPLETED | OUTPATIENT
Start: 2022-11-12 | End: 2022-11-12

## 2022-11-12 RX ORDER — FUROSEMIDE 10 MG/ML
40 INJECTION INTRAMUSCULAR; INTRAVENOUS
Status: COMPLETED | OUTPATIENT
Start: 2022-11-12 | End: 2022-11-12

## 2022-11-12 RX ADMIN — FUROSEMIDE 40 MG: 10 INJECTION, SOLUTION INTRAMUSCULAR; INTRAVENOUS at 20:17

## 2022-11-12 RX ADMIN — METOLAZONE 10 MG: 5 TABLET ORAL at 21:54

## 2022-11-13 ENCOUNTER — APPOINTMENT (OUTPATIENT)
Dept: ULTRASOUND IMAGING | Age: 82
DRG: 682 | End: 2022-11-13
Attending: INTERNAL MEDICINE
Payer: MEDICARE

## 2022-11-13 LAB
ALBUMIN SERPL-MCNC: 2.2 G/DL (ref 3.4–5)
ALBUMIN/GLOB SERPL: 0.8 {RATIO} (ref 0.8–1.7)
ALP SERPL-CCNC: 85 U/L (ref 45–117)
ALT SERPL-CCNC: 32 U/L (ref 16–61)
ANION GAP SERPL CALC-SCNC: 11 MMOL/L (ref 3–18)
AST SERPL-CCNC: 21 U/L (ref 10–38)
ATRIAL RATE: 81 BPM
BASOPHILS # BLD: 0 K/UL (ref 0–0.1)
BASOPHILS NFR BLD: 0 % (ref 0–2)
BILIRUB SERPL-MCNC: 2 MG/DL (ref 0.2–1)
BUN SERPL-MCNC: 90 MG/DL (ref 7–18)
BUN/CREAT SERPL: 15 (ref 12–20)
CALCIUM SERPL-MCNC: 8 MG/DL (ref 8.5–10.1)
CALCULATED P AXIS, ECG09: 68 DEGREES
CALCULATED R AXIS, ECG10: 20 DEGREES
CALCULATED T AXIS, ECG11: -21 DEGREES
CHLORIDE SERPL-SCNC: 106 MMOL/L (ref 100–111)
CO2 SERPL-SCNC: 22 MMOL/L (ref 21–32)
CREAT SERPL-MCNC: 5.88 MG/DL (ref 0.6–1.3)
CREAT UR-MCNC: 53 MG/DL (ref 30–125)
CREAT UR-MCNC: 56 MG/DL (ref 30–125)
DIAGNOSIS, 93000: NORMAL
DIFFERENTIAL METHOD BLD: ABNORMAL
EOSINOPHIL # BLD: 0 K/UL (ref 0–0.4)
EOSINOPHIL NFR BLD: 1 % (ref 0–5)
ERYTHROCYTE [DISTWIDTH] IN BLOOD BY AUTOMATED COUNT: 14.6 % (ref 11.6–14.5)
EST. AVERAGE GLUCOSE BLD GHB EST-MCNC: 151 MG/DL
GLOBULIN SER CALC-MCNC: 2.9 G/DL (ref 2–4)
GLUCOSE BLD STRIP.AUTO-MCNC: 130 MG/DL (ref 70–110)
GLUCOSE BLD STRIP.AUTO-MCNC: 141 MG/DL (ref 70–110)
GLUCOSE BLD STRIP.AUTO-MCNC: 159 MG/DL (ref 70–110)
GLUCOSE BLD STRIP.AUTO-MCNC: 168 MG/DL (ref 70–110)
GLUCOSE SERPL-MCNC: 140 MG/DL (ref 74–99)
HBA1C MFR BLD: 6.9 % (ref 4.2–5.6)
HCT VFR BLD AUTO: 29.5 % (ref 36–48)
HGB BLD-MCNC: 10.1 G/DL (ref 13–16)
IMM GRANULOCYTES # BLD AUTO: 0 K/UL (ref 0–0.04)
IMM GRANULOCYTES NFR BLD AUTO: 0 % (ref 0–0.5)
LYMPHOCYTES # BLD: 0.3 K/UL (ref 0.9–3.6)
LYMPHOCYTES NFR BLD: 15 % (ref 21–52)
MAGNESIUM SERPL-MCNC: 2.1 MG/DL (ref 1.6–2.6)
MCH RBC QN AUTO: 34.9 PG (ref 24–34)
MCHC RBC AUTO-ENTMCNC: 34.2 G/DL (ref 31–37)
MCV RBC AUTO: 102.1 FL (ref 78–100)
MONOCYTES # BLD: 0.2 K/UL (ref 0.05–1.2)
MONOCYTES NFR BLD: 11 % (ref 3–10)
NEUTS SEG # BLD: 1.7 K/UL (ref 1.8–8)
NEUTS SEG NFR BLD: 73 % (ref 40–73)
NRBC # BLD: 0 K/UL (ref 0–0.01)
NRBC BLD-RTO: 0 PER 100 WBC
P-R INTERVAL, ECG05: 202 MS
PLATELET # BLD AUTO: 49 K/UL (ref 135–420)
PLATELET COMMENTS,PCOM: ABNORMAL
PMV BLD AUTO: 10.8 FL (ref 9.2–11.8)
POTASSIUM SERPL-SCNC: 3.3 MMOL/L (ref 3.5–5.5)
PROT SERPL-MCNC: 5.1 G/DL (ref 6.4–8.2)
PROT UR-MCNC: 16 MG/DL
PROT/CREAT UR-RTO: 0.3
Q-T INTERVAL, ECG07: 428 MS
QRS DURATION, ECG06: 144 MS
QTC CALCULATION (BEZET), ECG08: 497 MS
RBC # BLD AUTO: 2.89 M/UL (ref 4.35–5.65)
RBC MORPH BLD: ABNORMAL
SODIUM SERPL-SCNC: 139 MMOL/L (ref 136–145)
SODIUM UR-SCNC: 88 MMOL/L (ref 20–110)
TROPONIN-HIGH SENSITIVITY: 133 NG/L (ref 0–78)
VENTRICULAR RATE, ECG03: 81 BPM
WBC # BLD AUTO: 2.2 K/UL (ref 4.6–13.2)

## 2022-11-13 PROCEDURE — 77010033678 HC OXYGEN DAILY

## 2022-11-13 PROCEDURE — 84156 ASSAY OF PROTEIN URINE: CPT

## 2022-11-13 PROCEDURE — 87186 SC STD MICRODIL/AGAR DIL: CPT

## 2022-11-13 PROCEDURE — 74011250637 HC RX REV CODE- 250/637: Performed by: INTERNAL MEDICINE

## 2022-11-13 PROCEDURE — 82962 GLUCOSE BLOOD TEST: CPT

## 2022-11-13 PROCEDURE — 83735 ASSAY OF MAGNESIUM: CPT

## 2022-11-13 PROCEDURE — 87086 URINE CULTURE/COLONY COUNT: CPT

## 2022-11-13 PROCEDURE — 83036 HEMOGLOBIN GLYCOSYLATED A1C: CPT

## 2022-11-13 PROCEDURE — 87077 CULTURE AEROBIC IDENTIFY: CPT

## 2022-11-13 PROCEDURE — 65270000046 HC RM TELEMETRY

## 2022-11-13 PROCEDURE — 76770 US EXAM ABDO BACK WALL COMP: CPT

## 2022-11-13 PROCEDURE — 84484 ASSAY OF TROPONIN QUANT: CPT

## 2022-11-13 PROCEDURE — 36415 COLL VENOUS BLD VENIPUNCTURE: CPT

## 2022-11-13 PROCEDURE — 74011000258 HC RX REV CODE- 258: Performed by: INTERNAL MEDICINE

## 2022-11-13 PROCEDURE — 84300 ASSAY OF URINE SODIUM: CPT

## 2022-11-13 PROCEDURE — 74011636637 HC RX REV CODE- 636/637: Performed by: INTERNAL MEDICINE

## 2022-11-13 PROCEDURE — 87506 IADNA-DNA/RNA PROBE TQ 6-11: CPT

## 2022-11-13 PROCEDURE — 80053 COMPREHEN METABOLIC PANEL: CPT

## 2022-11-13 PROCEDURE — 87324 CLOSTRIDIUM AG IA: CPT

## 2022-11-13 PROCEDURE — 85025 COMPLETE CBC W/AUTO DIFF WBC: CPT

## 2022-11-13 PROCEDURE — 74011250636 HC RX REV CODE- 250/636: Performed by: INTERNAL MEDICINE

## 2022-11-13 PROCEDURE — 82570 ASSAY OF URINE CREATININE: CPT

## 2022-11-13 RX ORDER — DOXAZOSIN 4 MG/1
4 TABLET ORAL
Status: DISCONTINUED | OUTPATIENT
Start: 2022-11-13 | End: 2022-11-27 | Stop reason: HOSPADM

## 2022-11-13 RX ORDER — SPIRONOLACTONE 25 MG/1
50 TABLET ORAL DAILY
Status: DISCONTINUED | OUTPATIENT
Start: 2022-11-14 | End: 2022-11-14

## 2022-11-13 RX ORDER — METOLAZONE 5 MG/1
10 TABLET ORAL DAILY
Status: DISCONTINUED | OUTPATIENT
Start: 2022-11-14 | End: 2022-11-16

## 2022-11-13 RX ORDER — FUROSEMIDE 10 MG/ML
80 INJECTION INTRAMUSCULAR; INTRAVENOUS 3 TIMES DAILY
Status: DISCONTINUED | OUTPATIENT
Start: 2022-11-13 | End: 2022-11-16

## 2022-11-13 RX ORDER — DEXTROSE MONOHYDRATE 100 MG/ML
0-250 INJECTION, SOLUTION INTRAVENOUS AS NEEDED
Status: DISCONTINUED | OUTPATIENT
Start: 2022-11-13 | End: 2022-11-27 | Stop reason: HOSPADM

## 2022-11-13 RX ORDER — MAGNESIUM SULFATE 100 %
4 CRYSTALS MISCELLANEOUS AS NEEDED
Status: DISCONTINUED | OUTPATIENT
Start: 2022-11-13 | End: 2022-11-27 | Stop reason: HOSPADM

## 2022-11-13 RX ORDER — CHOLESTYRAMINE 4 G/4.8G
4 POWDER, FOR SUSPENSION ORAL ONCE
Status: COMPLETED | OUTPATIENT
Start: 2022-11-13 | End: 2022-11-13

## 2022-11-13 RX ORDER — LANOLIN ALCOHOL/MO/W.PET/CERES
400 CREAM (GRAM) TOPICAL
Status: DISCONTINUED | OUTPATIENT
Start: 2022-11-13 | End: 2022-11-20

## 2022-11-13 RX ORDER — UREA 10 %
2 LOTION (ML) TOPICAL 2 TIMES DAILY
Status: DISCONTINUED | OUTPATIENT
Start: 2022-11-13 | End: 2022-11-27 | Stop reason: HOSPADM

## 2022-11-13 RX ORDER — LANOLIN ALCOHOL/MO/W.PET/CERES
1 CREAM (GRAM) TOPICAL 2 TIMES DAILY WITH MEALS
Status: DISCONTINUED | OUTPATIENT
Start: 2022-11-13 | End: 2022-11-27 | Stop reason: HOSPADM

## 2022-11-13 RX ORDER — METOPROLOL SUCCINATE 25 MG/1
25 TABLET, EXTENDED RELEASE ORAL EVERY EVENING
Status: DISCONTINUED | OUTPATIENT
Start: 2022-11-13 | End: 2022-11-14

## 2022-11-13 RX ORDER — CETIRIZINE HYDROCHLORIDE 10 MG/1
10 TABLET ORAL
Status: DISCONTINUED | OUTPATIENT
Start: 2022-11-13 | End: 2022-11-27 | Stop reason: HOSPADM

## 2022-11-13 RX ORDER — INSULIN LISPRO 100 [IU]/ML
INJECTION, SOLUTION INTRAVENOUS; SUBCUTANEOUS
Status: DISCONTINUED | OUTPATIENT
Start: 2022-11-13 | End: 2022-11-27 | Stop reason: HOSPADM

## 2022-11-13 RX ORDER — PREDNISONE 20 MG/1
80 TABLET ORAL
Status: DISCONTINUED | OUTPATIENT
Start: 2022-11-13 | End: 2022-11-13

## 2022-11-13 RX ADMIN — DOXAZOSIN MESYLATE 4 MG: 4 TABLET ORAL at 21:19

## 2022-11-13 RX ADMIN — CETIRIZINE HYDROCHLORIDE 10 MG: 10 TABLET, FILM COATED ORAL at 21:19

## 2022-11-13 RX ADMIN — CHOLESTYRAMINE 4 G: 4 POWDER, FOR SUSPENSION ORAL at 01:51

## 2022-11-13 RX ADMIN — Medication 2 TABLET: at 21:00

## 2022-11-13 RX ADMIN — FUROSEMIDE 80 MG: 10 INJECTION, SOLUTION INTRAMUSCULAR; INTRAVENOUS at 21:19

## 2022-11-13 RX ADMIN — CEFTRIAXONE 1 G: 1 INJECTION, POWDER, FOR SOLUTION INTRAMUSCULAR; INTRAVENOUS at 11:46

## 2022-11-13 RX ADMIN — FERROUS SULFATE TAB 325 MG (65 MG ELEMENTAL FE) 325 MG: 325 (65 FE) TAB at 17:24

## 2022-11-13 RX ADMIN — Medication 2 UNITS: at 22:25

## 2022-11-13 RX ADMIN — CETIRIZINE HYDROCHLORIDE 10 MG: 10 TABLET, FILM COATED ORAL at 01:51

## 2022-11-13 RX ADMIN — Medication 2 TABLET: at 12:51

## 2022-11-13 RX ADMIN — FUROSEMIDE 80 MG: 10 INJECTION, SOLUTION INTRAMUSCULAR; INTRAVENOUS at 11:54

## 2022-11-13 RX ADMIN — DOXAZOSIN MESYLATE 4 MG: 4 TABLET ORAL at 01:51

## 2022-11-13 RX ADMIN — Medication 400 MG: at 21:19

## 2022-11-13 RX ADMIN — FUROSEMIDE 80 MG: 10 INJECTION, SOLUTION INTRAMUSCULAR; INTRAVENOUS at 17:24

## 2022-11-13 RX ADMIN — METOPROLOL SUCCINATE 25 MG: 25 TABLET, EXTENDED RELEASE ORAL at 17:24

## 2022-11-13 RX ADMIN — Medication 400 MG: at 01:51

## 2022-11-13 NOTE — CONSULTS
Hematologic/Oncologic History as Below from Dr. Tylor Lozada clinic note:                                               Assessment:  Pancytopenia with worsening thrombocytopenia and leukopenia, macrocytic anemia and renal insufficiency. Not responding to high-dose Prednisone. TSH, LFTs SPEP, iron panel, H pylori (IgG and IgM), flow cytometry and HIV all wnl. Bone marrow biopsy 4/13/2022 results revealed no definitive immunophenotypic evidence of high-grade hematopoietic neoplasia, lymphoproliferative disease or a plasma cell neoplasm. Iron deficiency was noted. Genomic report revealed no evidence of somatic mutations or chromosomal structural abnormalities. No heme malignancy indicated. Since immature PLT fraction is not elevated (6.4), this argues against ITP. This most likely indicates that he has decreased production of PLT rather than destructive PLTs. of note, all workup negative so far including no splenomegaly at 15cm. Flow cytometry wnl 9/26/22. Chronic renal insufficiency  Crohn's disease ? active  Iron deficient. on oral iron x 6 months which caused constipation. Despite this, he still has low to absent iron stores on bone marrow bx 10/2022. Lethargy/Confusion: secondary to UTI. Hospitalized 10/6 - 10/7/22 with AMS attributed to UTI. MRI brain and CT head without acute abnormality. Repeat Bone marrow biopsy 10/20/2022 shows hypercellular marrow with trace to absent iron stores. The marrow is morphologically and immunophenotypically negative for lymphoma, myeloma, overt dysplasia, or leukemia. FISH and cytogenetic studies are negative. Plan:  Pancytopenia: Repeat bone marrow biopsy done 10/20/22. Report reviewed with patient and wife. The marrow is morphologically and immunophenotypically negative for lymphoma, myeloma, overt dysplasia, or leukemia. FISH and cytogenetic studies are negative. Sent for NGS. CBC from today reviewed: WBC 1.9, ANC 1.53, Hgb 12.0, PLT 29K, .   Thrombocytopenia needs to be addressed. 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 Will start working on Sincurue. Will begin with 50 mg daily and monitor CBC closely. If he has not improved to > 50K within 2 weeks, will increase dose to 75 mg/day. WBC has improved on its own and ANC is > 1.00, so he is not in any danger. Once acute leuk is ruled out by NGS, can start GCSF if ANC begins to drop again. He will have CBC drawn in one week and wait for Dr. Jeffrey Puente or myself to view results. Advised to call immediately for unusual bleeding, severe headache, or purple blisters in mouth. Anemia: Multiple causes-- renal insufficiency and AOCD (Crohn's), as well as low iron stores in the bone marrow. He did not tolerate oral iron (constipation). Will get him scheduled for IV iron. Risk for allergic reaction is discussed. He needs dental work, at least several extractions. Advised that, most likely, extractions will need to be pushed out until thrombocytopenia is better controlled. Will be happy to fill out clearance forms from [employer] in the future as needed. Bilateral lower extremity edema: no significant proteinuria. Follows with cardiologist. Suspect today's weight gain is due to IVF given in patient. Advised to f/u with cardiology if weight does not decrease over the next week or so. Advised to follow with GI regarding Crohn's disease. Recommended warm fluids, prunes/prune juice, and stool softener for constipation. May use Miralax if no relief but this may aggravate Crohn's. Avoid enemas or suppositories due to bleeding risk. CBC in one week. RTC in 2 weeks. CBC and CMP w/[facility] at that time. Plan of care discussed with Dr. Jeffrey Puente. Problem List:  Thrombocytopenic disorder (disorder)    Oncologic/Hematologic History:  Eligio Barajas is an 78-year-old man at the time of initial consultation, referred by Dr. Jimmy Weiss for pancytopenia. He has had low plt around 100K in 2020.    He also had anemia with Hb about 10 in 2020, but also renal insufficiency. He had normal wbc which started to trend down and this year WBC was 2.7. He feels more tired now and has easy bruises. denies fever, sob, cough  He does have h/o crohn's disease s/p 2 operations and is not followed by GI, intermittent diarrhea just like when he had active disease, not bloody stool. He is seen at [facility] for worsening pancytopenia on 3/7/2022. Labs from 6/13/2018: HGB 12; HCT 37.5. Labs from 4/23/2020: WBC 3.9; RBC 3.17; HGB 10.8; HCT 33.4; ; MCH 34; MCHC 32; RDW 13; PLOT 106. Labs from 1/27/2022: WBC 2.7; RBC 35; HGB 12; HCT 35.7; ; MCH 34; MCHC 34; RDW 12.7; PLT 55. Bone marrow biopsy 4/13/2022   [id number]  PERIPHERAL BLOOD SMEAR:  - MILD MACROCYTIC ANEMIA [HGB 11.8; ], OVALOCYTES, AND SHORT CHAIN ROULEAUX;  - DECREASED NEUTROPHILS [ANC 1.6], MONOCYTES [AMC 0.15], AND LYMPHOCYTES [ALC 0.50];  - DECREASED PLATELETS [PLT 82]. BONE MARROW, ASPIRATE SMEAR, TOUCH PREPARATION, CLOT SECTION, CORE BIOPSY:  - HYPERCELLULAR MARROW [AVG 50%]; ERYTHROID HYPERPLASIA [ME RATIO 1-2:1];  - TRACE IRON STORES; TRACE RETICULIN FIBROSIS;  - NO SUSPICIOUS LYMPHOCYTE, PLASMA CELL, MYELOID, OR BLAST POPULATION. PERIPHERAL BLOOD SMEAR:  - MILD MACROCYTIC ANEMIA [HGB 11.8; ], OVALOCYTES, AND SHORT CHAIN ROULEAUX;  - DECREASED NEUTROPHILS [ANC 1.6], MONOCYTES [AMC 0.15], AND LYMPHOCYTES [ALC 0.50];  - DECREASED PLATELETS [PLT 48]. BONE MARROW, ASPIRATE SMEAR, TOUCH PREPARATION, CLOT SECTION, CORE BIOPSY:  - HYPERCELLULAR MARROW [AVG 50%]; ERYTHROID HYPERPLASIA [ME RATIO 1-2:1];  - TRACE IRON STORES; TRACE RETICULIN FIBROSIS;  - NO SUSPICIOUS LYMPHOCYTE, PLASMA CELL, MYELOID, OR BLAST POPULATION.   NORMAL FISH results       5/11/2022 Bone marrow :   Addendum:  MOLECULAR NGS RESULTS [GENOMIC TESTING COOPERATIVE; KERRY CA]: -There is no evidence of mutations or abnormal expression in any of the tested genes. -No evidence of chromosomal structural abnormalities. -The lack of abnormalities suggests lack of evidence of neoplastic clone. ADDENDUM HEMATOPATHOLOGIST COMMENT: Molecular NGS studies [DNA and RNA] are negative for mutations, fusions, abnormal gene expression, or chromosomal structural abnormalities. This is against a clonal marrow disorder. Clinical correlation is recommended. Interpretation: Normal male karyotype. The population of cells analyzed did NOT detect a clonal chromosome abnormality; however, it does not exclude the possibility that a neoplastic disease exists. Please see concurrent bone marrow ([id number]) and FISH ([id number]) reports. Bone Marrow Biopsy 10/20/2022:   [id number]  PERIPHERAL BLOOD SMEAR:  - MILD MACROCYTIC ANEMIA [HGB 12.5; ] AND SHORT CHAIN ROULEAUX;  - LOW NORMAL NEUTROPHILS [ANC 2.1]; LOW MONOCYTES [AMC 0.13] AND LYMPHOCYTES [ALC 0.28];  - MARKEDLY DECREASED PLATELETS [PLT 15]. BONE MARROW, ASPIRATE SMEAR, TOUCH PREPARATION, CLOT SECTION, CORE BIOPSY:  - HYPERCELLULAR MARROW [30-50%][B1]; TRILINEAGE HEMATOPOIESIS [ME RATIO 4-5:1];  - TRACE TO ABSENT IRON STORES; TRACE RETICULIN FIBROSIS; ADEQUATE MEGAKARYOCYTES;  - NO SUSPICIOUS LYMPHOCYTE, PLASMA CELL, MYELOID, OR BLAST POPULATION. IMAGIN2020 MRI foot: Impression: 1. No evidence of acute osteomyelitis. 2. No discrete fluid collection to suggest abscess. 3. Diffuse soft tissue swelling.    2020 MRI Hindfoot: Impression: 1. No evidence of acute osteomyelitis. 2. Sinus tract on the plantar aspect of the foot, although no discrete abscess is identified. 3/14/2022 US ABD: Impression: 1. Increased renal cortical parenchymal echogenicity again noted consistent with medical renal disease. 2. Mild bilateral renal cortical parenchymal atrophy, right more so than left. 3. Moderate splenomegaly. No definite discrete focal splenic lesion. 4. Cholelithiasis without evidence of acute cholecystitis.     10/6/2022 CT Head: Impression: No acute intracranial pathology. 10/7/2022 MRI Brain: Impression: 1. Mild to moderate atrophy and moderate chronic small vessel changes. 2. Otherwise, unremarkable evaluation. Specifically, no acute intracranial abnormalities are identified. Reason for Today's Visit:  3 week f/u for thrombocytopenia; presents with his wife    Interval History:  Bone marrow biopsy 10/20/22 is negative for lymphoma, myeloma, overt dysplasia, or leukemia. FISH and cytogenetic studies are negative. No unusual bleeding, but he does bruise easily. Has large bruise on sacrum from bone marrow biopsy. Today was last day of prednisone. He has stopped oral iron and constipation has improved. He met with new dentist/oral surgeon on 10/24/22. It took him six months to get this appt. He needs several dental extractions, however teeth do not hurt. He will need clearance from us to get the extractions. Past Medical History:  Chronic kidney disease, stage 3, chronic  Hypertension  Enlarged prostate  Anemia  Hypomagnesemia  Crohn's disease  Hyperlipidemia  Irregular heartbeat    Past Surgical History:  Crohn's surgery x2  Back surgery    Review of Systems:  Please refer to Interval Summary for Review of Systems. Other than what it detailed in Interval Summary, review of systems negative to include constitutional, HEENT, cardiovascular, respiratory, breast, gastrointestinal, genitourinary, musculoskeletal, skin, neurologic, psychiatric, hematologic, endocrine, lymphatic, and allergy/immunologic.     Health Maintenance:  Former smoker  Covid-19 vaccine Crissijamel Sanderson) (2021), Elsewhere; Flu vaccine - Adult (), Elsewhere; Pneumococcal vaccine (Unspecified formulation) (2018), Elsewhere; Zoster Recombinant, Adjuvanted vaccine (), Elsewhere              Family History:  Father (comments: cancer):   Mother:  - Congestive heart failure    Social History:   to [place] in [facility]  Retired  No children  Smokes cigarettes x30 years  Denies alcohol      Vital Signs:  Blood pressure: 126/70, Standing, R arm, Large, Pulse: 73, Temperature: 96.9 F, Respirations: 16, O2 sat: 96%, At Rest, Room Air, Pain Scale: 0, Height: 69 in, Weight: 292.2 lb, BSA: 2.54, BMI: 43.15 kg/m2  BMI 43.15 kg/m2  Karnofsky 60% Requires occasional assistance, but is able to care for most of his/her needs. (Date: 10/31/2022)    Physical Exam:  Constitutional:  presents in wheelchair, oriented x 3, well nourished, no acute distress. Eyes:  conjunctivae normal, eyelids normal, pupils equal and round, anicteric. ENT:  external ears and nose normal, hearing grossly normal,  Neck:  supple, no masses. Thyroid without enlargement, palpable nodules, or tenderness. Respiratory:  breathing comfortably with normal chest expansion, lungs clear to auscultation  Cardiovascular: S1S2 with loud murmur, normal rhythm; 1-2+ edema in bilateral ankles/feet  Abdomen:  bowel sounds normal, no masses, no tenderness, no hepatomegaly, no splenomegaly  Pelvic:  pelvic exam deferred. Rectal:  rectal exam deferred. Lymph nodes:  no anterior/posterior cervical adenopathy, no supraclavicular adenopathy, no infraclavicular adenopathy  Skin:  no rash, no petechiae, large ecchymosis to sacrum, ecchymoses to bilateral forearms, no pallor, no cutaneous nodules. Musculoskeletal:  generalized weakness  Neurologic:  no focal motor deficit, no abnormal mental status. Psychiatric:  oriented to person, time and place, mood and affect appropriate to situation, appropriate judgement and insight, memory intact.       Laboratory:  Lab Results 10/31/2022 10/20/2022 10/12/2022 10/10/2022 10/03/2022 09/30/2022    CBC                  WBC x 10^3/uL 1.9 (L) 2.5 (L) 2.5 (L) 2.8 (L) 1.8 (LL) 2.0 (L)      RBC x 10^6/uL 3.49 (L) 3.62 (L) 3.49 (L) 3.63 (L) 3.39 (L) 3.59 (L)      NRBC, % 0.00 0.00   0.00 0.00        HGB g/dL 12.0 (L) 12.5 (L) 12.1 (L) 12.6 (L) 11.5 (L) 12.2 (L)      HCT % 36.0 (L) 36.9 (L) 35.8 (L) 37.3 (L) 35.1 (L) 36.4 (L)      MCV fL 103 (H) 102 (H) 103 (H) 103 (H) 104 (H) 101 (H)      MCH pg 34.4 (H) 34.5 (H) 34.7 (H) 34.7 (H) 33.9 (H) 34.0 (H)      MCHC g/dL 33.3 33.9 33.8 33.8 32.8 33.5      RDW-SD fL 53.9 (H) 51.0 (H) 51.0 (H) 50.3 (H) 51.2 (H) 49.0 (H)      PLT x 10^3/uL 29 (LL) 14 (LL) 23 (LL) 25 (LL) 36 (LL) 41 (LL)      MPV fL 12.60 12.20 12.30 12.50 12.60 11.60      Platelet, immature, fraction % 7.50 (H) 10.10 (H) 14.20 (H) 15.70 (H) 13.30 (H) 10.80 (H)      Carolyn % 81.4 (H) 82.8 (H) 79.4 88.4 (H) 76.6 68.8      LY % 10.1 11.2 12.3 7.2 (L) 15.9 19.8      MO % 6.9 5.2 6.3 4.0 6.3 7.9      EO % 1.1 0.4 0.4 0.0 0.6 2.0      IG % 0.50 0.40   0.40 0.60        Carolyn # (ANC) x 10^3/uL 1.53 (L) 2.06 2.00 2.45 1.35 (L) 1.39 (L)      BA % 0.0 0.0 0.0 0.0 0.0 0.5      MO # x 10^3/uL 0.13 0.13 0.16 0.11 0.11 0.16      EO # x 10^3/uL 0.02 0.01 0.01 0.00 0.01 0.04      BA # x 10^3/uL 0.00 0.00 0.00 0.00 0.00 0.01      LY # x 10^3/uL 0.19 (L) 0.28 (L) 0.31 (L) 0.20 (L) 0.28 (L) 0.40 (L)  Lab Results 10/31/2022 10/20/2022 10/12/2022 10/10/2022 10/03/2022 09/30/2022    Chemistries                  Glucose mg/dL         206 (H)        BUN mg/dL         42 (H)        Creatinine mg/dL         2.470 (HH)        Sodium mmol/L         146 (H)        Potassium mmol/L         3.1 (L)        Chloride mmol/L         109 (H)        CO2 mmol/L         24        Calcium mg/dL         8.8 (L)        Albumin g/dL         2.7 (L)        Total protein g/dL         6.4        Bilirubin, total mg/dL         1.4 (H)        Alkaline phosphatase U/L         72        AST/SGOT U/L         27        ALT/SGPT U/L         37        LDH U/L         214        GFR non-African American, estimated mL/min/1.73m2         25.07 (L)    Lab Results 10/31/2022 10/20/2022 10/12/2022 10/10/2022 10/03/2022 09/30/2022    Coa                   Lab Results 10/31/2022 10/20/2022 10/12/2022 10/10/2022 10/03/2022 09/30/2022    Urine                  Color (ua)           Straw      Appearance (ua)           Slightly Hazy (A)      Glucose (ua), qual           Negative      Bilirubin (ua)           Negative      Specific gravity (ua)           1.015      pH (ua)           6.0      Protein (ua)           NEGATIVE      Urinalysis, acetone or ketone bodies measurement           Negative      Nitrite (ua)           Positve (A)      Leukocyte esterase (ua), qual           moderate (A)      Blood (ua)           Trace Non-Hem (A)      Urobilinogen (ua)           0.2  Lab Results 10/31/2022 10/20/2022 10/12/2022 10/10/2022 10/03/2022 09/30/2022    Microbiology                  Urine culture & sensitivity           SEE NOTE (A)      Current Medications: Bactrim (Sulfamethoxazole-Trimethoprim Oral 800 mg-160 mg (DS)) 800-160 mg tablet  Doxazosin Oral 4 mg tablet  Ferrous Sulfate Oral (65 mg iron)  Ferrous Sulfate Oral 325 mg (65 mg iron) tablet  Vitamin C (Ascorbic Acid Oral ER Cap) 500 mg capsule, extended release  Bumetanide Oral mg  Metoprolol Oral 24 hr Tab (Succinate) 25 mg tablet extended release 24 hr  Desloratadine Oral 5 mg tablet  Promacta (Eltrombopag Oral) 50 mg tablet    Allergies & Adverse Reactions:  No known medication allergies               New Orders:  labs 10/31/2022, CBC w/ auto diff, Perform Date: 10/31/2022, Associated problem(s): Thrombocytopenic disorder (disorder) * (D69.6) ; 10/31/2022, CBC w/ auto diff, Perform Date: 11/14/2022, Associated problem(s): Thrombocytopenic disorder (disorder) * (D69.6) ; 10/31/2022, CMP w/ [facility], Perform Date: 10/31/2022, Associated problem(s): Thrombocytopenic disorder (disorder) * (D69.6) ; 10/31/2022, CMP w/ [facility], Perform Date: 11/14/2022, Associated problem(s):  Thrombocytopenic disorder (disorder) * (D69.6) ; 10/31/2022, Myeloid molecular profile by NGS, Instructions: [id number] bone marrow biopsy, specimen at [facility], Perform Date: 10/31/2022, Associated problem(s): Thrombocytopenic disorder (disorder) * (D69.6)  services 10/31/2022, RTC MD/NP, Instructions: 3650, Perform Date: 11/14/2022, Associated problem(s): Thrombocytopenic disorder (disorder) * (D69.6) ; 10/31/2022, RTC labs, Instructions: 1200; PLEASE HAVE PATIENT WAIT WHILE DR Trinh Snyder OR FELECIA REVIEWS RESULTS, Perform Date: 11/07/2022, Associated problem(s): Thrombocytopenic disorder (disorder) * (D69.6) ; 10/31/2022, RTC nurse for iron infusion, Instructions: Kimber Britt (LT) Please inform RN if date changes, Perform Date: 11/07/2022, Associated problem(s): Thrombocytopenic disorder (disorder) * (D69.6) ; 10/31/2022, RTC nurse for iron infusion, Instructions: Kimber Britt (LT) Please inform RN if date changes, Perform Date: 11/14/2022, Associated problem(s): Thrombocytopenic disorder (disorder) * (D69.6)    Other Physicians: Oliver Pelaez DO, Vanessa Sanderson MD    Send copy of note to: Oliver Pelaez DO (Referring)  Vanessa Sanderson MD (Referring)  MD Guera Gutierrez, [facility]      Note: This note was dictated using a computer transcription program.  Although proofread, it may contain computer transcription and/or phonetic errors. Other human proofreading errors may also exist.  Please contact us for any clarification if needed.

## 2022-11-13 NOTE — PROGRESS NOTES
Reason for Admission:   Chart reviewed; per H&P, patient is a Elise Glatter 78 y.o.  male who has history of Crohn's disease, chronic kidney disease, aortic stenosis severe requiring valvular surgery presents to the emergency room with complaints of increasing lower extremity edema and shortness of breath despite adjusting his diuretic regimen at home. In the emergency room he was found to be in acute kidney failure with increasing creatinine to 5 from a baseline of 2.5 he also had some mild hypokalemia with troponin elevation nephrology was consulted and recommended Lasix IV 3 times daily as well as Zaroxolyn 10 mg daily patient was recently discharged from the hospital 1 month ago with metabolic encephalopathy from UTI. \"  Last at [facility] 10/6-10/7/22. RUR Score:     high, 20%      PCP: First and Last name:  Adam Ledbetter NP     Name of Practice:    Family Medicine   Are you a current patient: Yes/No:  Yes   Approximate date of last visit: 10/6/22   Can you do a virtual visit with your PCP:   no             Resources/supports as identified by patient/family:                   Top Challenges facing patient (as identified by patient/family and CM): Finances/Medication cost?      Medicare              Transportation? wife              Support system or lack thereof? Wife of 61 years                     Living arrangements? Lives with wife           Self-care/ADLs/Cognition?   Alert, appropriate, uses walker for ambulation          Current Advanced Directive/Advance Care Plan:  Full Code      Healthcare Decision Maker:   Click here to complete [address] including selection of the Healthcare Decision Maker Relationship (ie \"Primary\")      Primary Decision Maker: Grant Apple Spouse - [phone]    Payor Source Payor: [address] [place] [address],9D / Plan: [address] / Product Type: Managed Care Medicare /                             Plan for utilizing home health:    PT/OT consults ordered                 Transition of Care Plan:            1250: care manager observed enteric contact precautions and met with patient and wife at bedside; patient uses walker at home; Cdiff sample has been sent and results pending; patient will be evaluated by PT/OT for home needs given that he was here last month and is back in hospital.       Care Management Interventions  PCP Verified by CM:  Yes  Mode of Transport at Discharge: Self  Transition of Care Consult (CM Consult): Discharge Planning  Support Systems: Spouse/Significant Other  Confirm Follow Up Transport: Family  The Plan for Transition of Care is Related to the Following Treatment Goals : acute renal failure  The Patient and/or Patient Representative was Provided with a Choice of Provider and Agrees with the Discharge Plan?: Yes  Name of the Patient Representative Who was Provided with a Choice of Provider and Agrees with the Discharge Plan: Eligio Barajas, patient  Discharge Location  Patient Expects to be Discharged to[de-identified] Home with family assistance ; evaluate for possible [place] needs

## 2022-11-13 NOTE — PROGRESS NOTES
Hospitalist Progress Note    Patient: Christine Salomon MRN: [id number]  CSN: [id number]    YOB: 1940  Age: 78 y.o. Sex: male    DOA: 11/12/2022 LOS:  LOS: 1 day          Chief Complaint:    Acute renal failure on chronic kidney disease    Assessment/Plan     Acute renal failure on chronic kidney disease  Avoid NSAIDs  Gillis placed  Renal ultrasound for the a.m.   Strict I's and O's  Daily weight  IV Lasix and Zaroxolyn     Severe aortic stenosis  Trend troponins  Cardiology is consulted  Holding ACE and ARB right now we will defer on echo  Last EF was 60 to 65% with grade 1 diastolic dysfunction     Thrombocytopenia  He is not on high-dose prednisone   Avoid heparin  SCDs for DVT prevention  Started on Promacta 50mg daily, we do not have this patient will need to bring his home supply he has notified his wife, hematology consult to see if this has contributed to his renal failure and diarrhea     Hypertension  Is restarted on his Cardura and metoprolol     History of Crohn's disease supportive care  Continue Questran daily for diarrhea  Since patient has been on prolonged course for of antibiotics for UTI we will start on probiotics check cultures for C. difficile and enteric pathogen     Hyperglycemia likely aggravated by prednisone he is placed on a sliding scale insulin he may have diabetes although his last A1c was 6.2     Diarrhea  Check stool cultures  Cdiff   Start pro biotics continue Questran     Recent UTI causing encephalopathy we will check urine analysis  STARTED ON Rocephin until cultures are back   Palliative care consult  DVT/GI Prophylaxis:  SCDs and Protonix      Disposition :  Patient Active Problem List   Diagnosis Code    Altered mental status N79.36    Acute metabolic encephalopathy G49.76    Thrombocytopenia (HCC) D69.6    Aortic stenosis I35.0    CKD (chronic kidney disease) N18.9    Acute kidney injury superimposed on chronic kidney disease ([facility] [address] N17.9, N18.9    Peripheral edema R60.9    Hypoxia R09.02    NAPOLEON (acute kidney injury) ([facility] [address] N17.9       Subjective:    No complaints    Review of systems:    Constitutional: denies fevers, chills, myalgias  Respiratory: denies SOB, cough  Cardiovascular: denies chest pain, palpitations  Gastrointestinal: denies nausea, vomiting, diarrhea      Vital signs/Intake and Output:  Visit Vitals  /66   Pulse 80   Temp 97.3 °F (36.3 °C)   Resp 18   Ht 6' 1\" (1.854 m)   Wt 124.7 kg (275 lb)   SpO2 100%   BMI 36.28 kg/m²     Current Shift:  11/13 0701 - 11/13 1900  In: -   Out: 680 [Urine:680]  Last three shifts:  No intake/output data recorded. Exam:    General: Well developed, alert, NAD, OX3  Head/Neck: NCAT, supple, No masses, No lymphadenopathy  CVS:Regular rate and rhythm, no M/R/G, S1/S2 heard, no thrill  Lungs:Clear to auscultation bilaterally, no wheezes, rhonchi, or rales  Abdomen: Soft, Nontender, No distention, Normal Bowel sounds, No hepatomegaly  Extremities: No C/C/E, pulses palpable 2+  Skin:normal texture and turgor, no rashes, no lesions  Neuro:grossly normal , follows commands  Psych:appropriate                Labs: Results:       Chemistry Recent Labs     11/13/22  0655 11/12/22 2015   * 159*    139   K 3.3* 3.6    107   CO2 22 20*   BUN 90* 87*   CREA 5.88* 5.62*   CA 8.0* 7.9*   AGAP 11 12   BUCR 15 15   AP 85 96   TP 5.1* 5.6*   ALB 2.2* 2.4*   GLOB 2.9 3.2   AGRAT 0.8 0.8      CBC w/Diff Recent Labs     11/13/22  0655 11/12/22 2015   WBC 2.2* 2.5*   RBC 2.89* 3.12*   HGB 10.1* 11.0*   HCT 29.5* 31.8*   PLT 49* 65*   GRANS 73 74*   LYMPH 15* 14*   EOS 1 0      Cardiac Enzymes No results for input(s): CPK, CKND1, CESAR in the last 72 hours. No lab exists for component: CKRMB, TROIP   Coagulation No results for input(s): PTP, INR, APTT, INREXT in the last 72 hours.     Lipid Panel Lab Results   Component Value Date/Time    Cholesterol, total 102 10/07/2022 03:45 AM    HDL Cholesterol 42 10/07/2022 03:45 AM    LDL, calculated 47.6 10/07/2022 03:45 AM    VLDL, calculated 12.4 10/07/2022 03:45 AM    Triglyceride 62 10/07/2022 03:45 AM    CHOL/HDL Ratio 2.4 10/07/2022 03:45 AM      BNP No results for input(s): BNPP in the last 72 hours.    Liver Enzymes Recent Labs     11/13/22  0655   TP 5.1*   ALB 2.2*   AP 85      Thyroid Studies No results found for: T4, T3U, TSH, TSHEXT     Procedures/imaging: see electronic medical records for all procedures/Xrays and details which were not copied into this note but were reviewed prior to creation of Elizabeth Alves MD

## 2022-11-13 NOTE — ED PROVIDER NOTES
EMERGENCY DEPARTMENT HISTORY AND PHYSICAL EXAM    Date: 11/12/2022  Patient Name: Lennart Angelucci. History of Presenting Illness     Chief Complaint   Patient presents with    Peripheral Edema    Shortness of Breath       History Provided By: Patient and Patient's Wife     History Misael Hernandez):   7:58 PM  Lennart Angelucci. is a 78 y.o. male with a PMHX of Hypertension, CKD, Crohn's, thrombocytopenia, aortic stenosis  who presents to the emergency department (room 6) C/O dyspnea onset 1 week ago. Associated sxs include peripheral edema. Pt denies pain or any other sxs or complaints. Patient and wife state that he has had increased leg swelling and shortness of breath over the last 2 months. Over the last 3 weeks this has resulted him being increased from 2 diuretics to 3 diuretics. He has had recent admissions to the hospital for mental status changes. He is pending surgical intervention for his aortic stenosis but has been unable to get the surgery yet due to his thrombocytopenia. Chief Complaint: Dyspnea  Onset: 1 week  Timing:  Acute on Subacute  Context: Symptoms started spontaneously, symptoms have progressively worsened since onset  Location: Lungs  Quality: Pressure and Tightness  Severity: Moderate  Modifying Factors: Nothing makes it better, or worse.   Associated Symptoms:  Peripheral edema    PCP: Mio Curtis NP  Cardiology: Dr. Pacheco Paredes     Past History         Past Medical History:  Past Medical History:   Diagnosis Date    Bladder calculus 2015    Chronic kidney disease     H/O kidney stones    Crohn disease ([address]     Foot drop     left    Hypertension     Kidney calculi        Past Surgical History:  Past Surgical History:   Procedure Laterality Date    HX BACK SURGERY      3 back surgery nola and screw    HX CATARACT REMOVAL Bilateral     HX GI      bowel [id number]    HX GI      hemorrhoidectomy    HX UROLOGICAL      kidney stone removal       Family History:  No family history on file.  Reviewed and non-contributory    Social History:  Social History     Tobacco Use    Smoking status: Former     Types: Cigarettes     Quit date: 2000     Years since quittin.2    Smokeless tobacco: Never   Vaping Use    Vaping Use: Never used   Substance Use Topics    Alcohol use: No    Drug use: No       Medications:  Current Outpatient Medications   Medication Sig Dispense Refill    predniSONE (DELTASONE) 20 mg tablet TAKE 4 TABLETS BY MOUTH DAILY. TAKE WITH FOOD TO PREVENT STOMACH UPSET      doxazosin (CARDURA) 4 mg tablet Take 1 Tablet by mouth nightly. Indications: enlarged prostate with urination problem, high blood pressure 20 Tablet 0    magnesium oxide (MAG-OX) 400 mg tablet Take 400 mg by mouth nightly. cholestyramine light (QUESTRAN LITE) 4 gram packet Take 4 g by mouth once. desloratadine (CLARINEX) 5 mg tablet Take 5 mg by mouth nightly. Indications: ALLERGIC RHINITIS      METOPROLOL SUCCINATE PO Take 25 mg by mouth every evening. ferrous sulfate 325 mg (65 mg iron) tablet Take  by mouth Daily (before breakfast). Allergies:  No Known Allergies    Social Determinants of Health:  Social Determinants of Health     Tobacco Use: Medium Risk    Smoking Tobacco Use: Former    Smokeless Tobacco Use: Never    Passive Exposure: Not on file   Alcohol Use: Not on file   Financial Resource Strain: Not on file   Food Insecurity: Not on file   Transportation Needs: Not on file   Physical Activity: Not on file   Stress: Not on file   Social Connections: Not on file   Intimate Partner Violence: Not on file   Depression: Not on file   Housing Stability: Not on file       Review of Systems      Review of Systems   Constitutional:  Negative for chills and fever. HENT:  Negative for rhinorrhea and sore throat. Eyes:  Negative for pain and visual disturbance. Respiratory:  Positive for shortness of breath. Negative for chest tightness and wheezing.     Cardiovascular:  Positive for leg swelling. Negative for chest pain and palpitations. Gastrointestinal:  Negative for abdominal pain, diarrhea, nausea and vomiting. Musculoskeletal:  Negative for arthralgias and myalgias. Skin:  Negative for rash and wound. Neurological:  Negative for speech difficulty, light-headedness and headaches. Psychiatric/Behavioral:  Negative for agitation and confusion. All other systems reviewed and are negative. Physical Exam     Vitals:    11/12/22 1943 11/12/22 2016 11/12/22 2027 11/12/22 2202   BP: 107/63 106/69  (!) 108/57   Pulse: 80 82  79   Resp: 20   16   Temp: 98 °F (36.7 °C)      SpO2: 99% 100% 100% 98%   Weight: 124.7 kg (275 lb)      Height: 6' 1\" (1.854 m)          Physical Exam  Vitals and nursing note reviewed. Constitutional:       General: He is not in acute distress. Appearance: Normal appearance. He is normal weight. He is not ill-appearing. HENT:      Head: Normocephalic and atraumatic. Nose: Nose normal. No rhinorrhea. Mouth/Throat:      Mouth: Mucous membranes are moist.      Pharynx: No oropharyngeal exudate or posterior oropharyngeal erythema. Eyes:      Extraocular Movements: Extraocular movements intact. Conjunctiva/sclera: Conjunctivae normal.      Pupils: Pupils are equal, round, and reactive to light. Cardiovascular:      Rate and Rhythm: Normal rate and regular rhythm. Heart sounds: Murmur heard. Systolic murmur is present with a grade of 5/6. No friction rub. No gallop. Comments: Bilateral pitting edema to the groin  Pulmonary:      Effort: Pulmonary effort is normal. No respiratory distress. Breath sounds: Decreased air movement present. Examination of the right-upper field reveals wheezing and rales. Examination of the left-upper field reveals wheezing and rales. Examination of the right-middle field reveals wheezing and rales. Examination of the left-middle field reveals wheezing and rales.  Examination of the right-lower field reveals wheezing and rales. Examination of the left-lower field reveals wheezing and rales. Decreased breath sounds, wheezing and rales present. No rhonchi. Abdominal:      General: Bowel sounds are normal.      Palpations: Abdomen is soft. Tenderness: There is no abdominal tenderness. There is no guarding or rebound. Musculoskeletal:         General: No swelling, tenderness or deformity. Normal range of motion. Cervical back: Normal range of motion and neck supple. No rigidity. Right lower le+ Pitting Edema present. Left lower le+ Pitting Edema present. Lymphadenopathy:      Cervical: No cervical adenopathy. Skin:     General: Skin is warm and dry. Findings: No rash. Neurological:      General: No focal deficit present. Mental Status: He is alert and oriented to person, place, and time.    Psychiatric:         Mood and Affect: Mood normal.         Behavior: Behavior normal.       Diagnostic Study Results     Labs -  Recent Results (from the past 12 hour(s))   EKG, 12 LEAD, INITIAL    Collection Time: 22  7:50 PM   Result Value Ref Range    Ventricular Rate 81 BPM    Atrial Rate 81 BPM    P-R Interval 202 ms    QRS Duration 144 ms    Q-T Interval 428 ms    QTC Calculation (Bezet) 497 ms    Calculated P Axis 68 degrees    Calculated R Axis 20 degrees    Calculated T Axis -21 degrees    Diagnosis       Normal sinus rhythm  Right bundle branch block  Possible Inferior infarct (cited on or before 06-SEP-2022)  Cannot rule out Anterior infarct , age undetermined  Abnormal ECG  When compared with ECG of 06-SEP-2022 17:17,  Minimal criteria for Anterior infarct are now present  T wave inversion more evident in Inferior leads     CBC WITH AUTOMATED DIFF    Collection Time: 22  8:15 PM   Result Value Ref Range    WBC 2.5 (L) 4.6 - 13.2 K/uL    RBC 3.12 (L) 4.35 - 5.65 M/uL    HGB 11.0 (L) 13.0 - 16.0 g/dL    HCT 31.8 (L) 36.0 - 48.0 %    .9 (H) 78.0 - 100.0 FL    MCH 35.3 (H) 24.0 - 34.0 PG    MCHC 34.6 31.0 - 37.0 g/dL    RDW 14.6 (H) 11.6 - 14.5 %    PLATELET 65 (L) 586 - 420 K/uL    MPV 11.5 9.2 - 11.8 FL    NRBC 0.0 0  WBC    ABSOLUTE NRBC 0.00 0.00 - 0.01 K/uL    NEUTROPHILS 74 (H) 40 - 73 %    LYMPHOCYTES 14 (L) 21 - 52 %    MONOCYTES 11 (H) 3 - 10 %    EOSINOPHILS 0 0 - 5 %    BASOPHILS 0 0 - 2 %    IMMATURE GRANULOCYTES 0 0.0 - 0.5 %    ABS. NEUTROPHILS 1.9 1.8 - 8.0 K/UL    ABS. LYMPHOCYTES 0.4 (L) 0.9 - 3.6 K/UL    ABS. MONOCYTES 0.3 0.05 - 1.2 K/UL    ABS. EOSINOPHILS 0.0 0.0 - 0.4 K/UL    ABS. BASOPHILS 0.0 0.0 - 0.1 K/UL    ABS. IMM. GRANS. 0.0 0.00 - 0.04 K/UL    DF AUTOMATED     METABOLIC PANEL, COMPREHENSIVE    Collection Time: 11/12/22  8:15 PM   Result Value Ref Range    Sodium 139 136 - 145 mmol/L    Potassium 3.6 3.5 - 5.5 mmol/L    Chloride 107 100 - 111 mmol/L    CO2 20 (L) 21 - 32 mmol/L    Anion gap 12 3.0 - 18 mmol/L    Glucose 159 (H) 74 - 99 mg/dL    BUN 87 (H) 7.0 - 18 MG/DL    Creatinine 5.62 (H) 0.6 - 1.3 MG/DL    BUN/Creatinine ratio 15 12 - 20      eGFR 9 (L) >60 ml/min/1.73m2    Calcium 7.9 (L) 8.5 - 10.1 MG/DL    Bilirubin, total 1.9 (H) 0.2 - 1.0 MG/DL    ALT (SGPT) 38 16 - 61 U/L    AST (SGOT) 27 10 - 38 U/L    Alk.  phosphatase 96 45 - 117 U/L    Protein, total 5.6 (L) 6.4 - 8.2 g/dL    Albumin 2.4 (L) 3.4 - 5.0 g/dL    Globulin 3.2 2.0 - 4.0 g/dL    A-G Ratio 0.8 0.8 - 1.7     NT-PRO BNP    Collection Time: 11/12/22  8:15 PM   Result Value Ref Range    NT pro-BNP 4,350 (H) 0 - 1,800 PG/ML   TROPONIN-HIGH SENSITIVITY    Collection Time: 11/12/22  8:15 PM   Result Value Ref Range    Troponin-High Sensitivity 175 (HH) 0 - 78 ng/L   MAGNESIUM    Collection Time: 11/12/22  8:15 PM   Result Value Ref Range    Magnesium 2.0 1.6 - 2.6 mg/dL   URINALYSIS W/ RFLX MICROSCOPIC    Collection Time: 11/12/22 10:00 PM   Result Value Ref Range    Color YELLOW      Appearance CLEAR      Specific gravity 1.012 1.005 - 1.030 pH (UA) 5.0 5.0 - 8.0      Protein Negative NEG mg/dL    Glucose Negative NEG mg/dL    Ketone Negative NEG mg/dL    Bilirubin Negative NEG      Blood SMALL (A) NEG      Urobilinogen 0.2 0.2 - 1.0 EU/dL    Nitrites Negative NEG      Leukocyte Esterase MODERATE (A) NEG     URINE MICROSCOPIC ONLY    Collection Time: 11/12/22 10:00 PM   Result Value Ref Range    WBC 40 to 50 0 - 5 /hpf    RBC 4 to 10 0 - 5 /hpf    Epithelial cells Negative 0 - 5 /lpf    Bacteria 3+ (A) NEG /hpf       Radiologic Studies -   XR CHEST PORT   Final Result      No acute cardiopulmonary process. CT Results  (Last 48 hours)      None          CXR Results  (Last 48 hours)                 11/12/22 2051  XR CHEST PORT Final result    Impression:      No acute cardiopulmonary process. Narrative:  EXAM: One view chest x-ray       CLINICAL INDICATION/HISTORY: Dyspnea and lower extremity swelling. COMPARISON: 09/06/2022. TECHNIQUE: Single AP view of the chest was obtained.       _______________       FINDINGS:       HEART, VESSELS, MEDIASTINUM: Heart size is stable. No vascular congestion. There   is atherosclerosis of the thoracic aorta. LUNGS, PLEURAL SPACES: The lungs are clear. No effusion or pneumothorax. BONY THORAX, SOFT TISSUES: Unremarkable.       _______________                   Medications given in the ED-  Medications   furosemide (LASIX) injection 40 mg (40 mg IntraVENous Given 11/12/22 2017)   metOLazone (ZAROXOLYN) tablet 10 mg (10 mg Oral Given 11/12/22 2154)       Procedures     Procedures    ED Course     I Aníbal Gorman MD) am the first provider for this patient. I reviewed the vital signs, available nursing notes, past medical history, past surgical history, family history and social history. Records Reviewed: Nursing Notes    Cardiac Monitor:  Rate: 80 bpm  Rhythm: sinus rhythm    Pulse Oximetry Analysis - 99% on RA    EKG interpretation: (Preliminary)  Rhythm: NSR.  Rate: 81 bpm; no STEMI, right bundle branch block  EKG read by Deanna Hayes MD at 7:50 PM    7:58 PM Initial assessment performed. The patients presenting problems have been discussed, and they are in agreement with the care plan formulated and outlined with them. I have encouraged them to ask questions as they arise throughout their visit. ED Course as of 11/12/22 2245   Sat Nov 12, 2022 2150 Discussed with Dr. Anthony Leonard, admit to medicine, discuss with nephrology. [JM]   2151 Discussed with Dr. Lizeth Padilla. place alcocer give Lasix 80 mg IV TID, 10 mg metolazone, consider renal ultrasound tomorrow. Patient has a history of obstructive uropathy's. [JM]   [phone] Discussed with Dr. Chandler Mendez for telemetry admission. [JM]      ED Course User Index  [JM] Leopold Searle, MD         Medical Decision Making     Provider Notes (Medical Decision Making):   DDX: Aortic stenosis, ACS, URI, pneumonia, peripheral edema, CHF, NAPOLEON, CRF    Discussion:  78 y.o. male with a critical aortic stenosis, markedly fluid overloaded with an NAPOLEON today. Discussed with cardiologist and nephrologist for inpatient admission. Alcocer placed based on their recommendations. Increase his Lasix and added metolazone. Discussed with hospitalist for telemetry admission. Patient understands agrees with plan for admission. Critical Care Time:   I have spent 45 minutes of critical care time involved in lab review, consultations with specialist, family decision-making, and documentation. This time does not include time spent on separately billable procedures. During this entire length of time I was immediately available to the patient. Critical Care: The reason for providing this level of medical care for this critically ill patient was due a critical illness that impaired one or more vital organ systems such that there was a high probability of imminent or life threatening deterioration in the patients condition.  This care involved high complexity decision making to assess, manipulate, and support vital system functions, to treat this degreee vital organ system failure and to prevent further life threatening deterioration of the patients condition. Multiple IV Lasix treatments, consults to cardiology and nephrology for critical aortic stenosis and renal failure. Sujey Merritt MD    Diagnosis and Disposition     Critical Care Time: 45 minutes    Core Measures:  For Hospitalized Patients:    1. Hospitalization Decision Time:  The decision to hospitalize the patient was made by Luis Manuel Dennis MD, MD at 9:50 PM on 11/12/2022    2. Aspirin: Aspirin was not given because the patient did not present with a stroke at the time of their Emergency Department evaluation    10:42 PM Patient is being admitted to the hospital by Dr. Philomena Huddleston. The results of their tests and reasons for their admission have been discussed with them and/or available family. They convey agreement and understanding for the need to be admitted and for their admission diagnosis. CONDITIONS ON ADMISSION:  Sepsis is not present at the time of admission. Deep Vein Thrombosis is not present at the time of admission. Thrombosis is not present at the time of admission. Urinary Tract Infection is not present at the time of admission. Pneumonia is not present at the time of admission. MRSA is not present at the time of admission. Wound infection is not present at the time of admission. Pressure Ulcer is not present at the time of admission. CLINICAL IMPRESSION:    1. Aortic valve stenosis, etiology of cardiac valve disease unspecified    2. Hypoxia    3. Peripheral edema    4. Shortness of breath    5. NAPOLEON (acute kidney injury) ([facility] [address]      I Sujey Merritt MD am the primary clinician of record. Dragon Disclaimer     Please note that this dictation was completed with [employer], the [employer] voice recognition software.   Quite often unanticipated grammatical, syntax, homophones, and other interpretive errors are inadvertently transcribed by the computer software. Please disregard these errors. Please excuse any errors that have escaped final proofreading.     Bernadine Mcdonald MD

## 2022-11-13 NOTE — PROGRESS NOTES
Phone: [phone]  Paging : [phone]      Hematology / Oncology Progress Note    Admit Date: 11/12/2022    Assessment:     Hem/Onc Issues: Thrombocytopenia, ITP, did not respond to steroid (stopped 10/31/2022); on Promacta since 11/7/2022, Repeat Bone marrow biopsy 10/20/2022   Reasons for Admission:  LE edema and SOB, Acute on chronic renal failure  Other Active Issues:    Crohn's disease  Acute on chronic renal failure    Active Problems: Aortic stenosis (10/7/2022)      Peripheral edema (11/12/2022)      Hypoxia (11/12/2022)      NAPOLEON (acute kidney injury) ([facility] [address] (11/12/2022)        Plan:     OK to continue promacta, may use home meds, just started 11/7/2022  Platelet count 49, no indication for transfusion  Follow up CBC  No other hematologic interventions  Diarrhea with history of Crohn's disease; being ruled out of C. diff  Other management per primary team and nephrology eam      Subjectives:     Admitted with SOB, edema, acute on chronic renal failure  Denies fever chills bleeding    Objectives:      VITAL SIGNS: Patient Vitals for the past 24 hrs:   BP Temp Pulse Resp SpO2 Height Weight   11/13/22 0528 105/61 97.3 °F (36.3 °C) 80 18 99 % -- --   11/13/22 0241 -- -- -- -- 97 % -- --   11/12/22 2202 (!) 108/57 -- 79 16 98 % -- --   11/12/22 2027 -- -- -- -- 100 % -- --   11/12/22 2016 106/69 -- 82 -- 100 % -- --   11/12/22 1943 107/63 98 °F (36.7 °C) 80 20 99 % 6' 1\" (1.854 m) 124.7 kg (275 lb)     GENERAL: normal appearance, no acute distress. HEENT: conjunctivae normal, eyelids normal, PERRLA, anicteric, external ears and nose normal, hearing grossly normal.   NECK: supple, no masses. LUNG: breathing comfortably, lungs clear to auscultation and percussion. CARDIOVASCULAR: normal heart sounds, heart rhythm regular, no peripheral edema. ABDOMEN: soft. bowel sounds normal, non-tender non distension, no hepatosplenomegaly   PELVIS: pelvic exam deferred. RECTUM: rectal exam deferred. LYMPH NODES: no cervical adenopathy, no axillary adenopathy, no supraclavicular adenopathy, no infraclavicular adenopathy. SKIN: no rash, no petechiae, no ecchymosis, no pallor, no cutaneous nodules. MUSCULOSKELETAL: normal muscle strength. NEUROLOGIC: no focal motor deficit, normal gait, no abnormal mental status. PSYCHIATRIC: oriented to person, time and place, mood and affect appropriate.       Medications:      Current Medications:    Current Facility-Administered Medications   Medication Dose Route Frequency    doxazosin (CARDURA) tablet 4 mg  4 mg Oral QHS    magnesium oxide (MAG-OX) tablet 400 mg  400 mg Oral QHS    metoprolol succinate (TOPROL-XL) XL tablet 25 mg  25 mg Oral QPM    ferrous sulfate tablet 325 mg  1 Tablet Oral BID WITH MEALS    cetirizine (ZYRTEC) tablet 10 mg  10 mg Oral QHS    furosemide (LASIX) injection 80 mg  80 mg IntraVENous TID    [START ON 11/14/2022] metOLazone (ZAROXOLYN) tablet 10 mg  10 mg Oral DAILY    insulin lispro (HUMALOG) injection   SubCUTAneous AC&HS    glucose chewable tablet 16 g  4 Tablet Oral PRN    glucagon (GLUCAGEN) injection 1 mg  1 mg IntraMUSCular PRN    dextrose 10% infusion 0-250 mL  0-250 mL IntraVENous PRN    eltrombopag (PROMACTA) tablet 50 mg (Patient Supplied)  50 mg Oral ACB    cefTRIAXone (ROCEPHIN) 1 g in 0.9% sodium chloride (MBP/ADV) 50 mL MBP  1 g IntraVENous Q24H    Lactobacillus Acidoph & Bulgar (FLORANEX) tablet 2 Tablet  2 Tablet Oral BID    [START ON 11/14/2022] spironolactone (ALDACTONE) tablet 50 mg  50 mg Oral DAILY       Allergies:    No Known Allergies      Ancillary Study Results:      Laboratory:    Recent Results (from the past 24 hour(s))   EKG, 12 LEAD, INITIAL    Collection Time: 11/12/22  7:50 PM   Result Value Ref Range    Ventricular Rate 81 BPM    Atrial Rate 81 BPM    P-R Interval 202 ms    QRS Duration 144 ms    Q-T Interval 428 ms    QTC Calculation (Bezet) 497 ms    Calculated P Axis 68 degrees    Calculated R Axis 20 degrees    Calculated T Axis -21 degrees    Diagnosis       Normal sinus rhythm  Right bundle branch block  Possible Inferior infarct (cited on or before 06-SEP-2022)  Cannot rule out Anterior infarct , age undetermined  Abnormal ECG  When compared with ECG of 06-SEP-2022 17:17,  Minimal criteria for Anterior infarct are now present  T wave inversion more evident in Inferior leads     CBC WITH AUTOMATED DIFF    Collection Time: 11/12/22  8:15 PM   Result Value Ref Range    WBC 2.5 (L) 4.6 - 13.2 K/uL    RBC 3.12 (L) 4.35 - 5.65 M/uL    HGB 11.0 (L) 13.0 - 16.0 g/dL    HCT 31.8 (L) 36.0 - 48.0 %    .9 (H) 78.0 - 100.0 FL    MCH 35.3 (H) 24.0 - 34.0 PG    MCHC 34.6 31.0 - 37.0 g/dL    RDW 14.6 (H) 11.6 - 14.5 %    PLATELET 65 (L) 327 - 420 K/uL    MPV 11.5 9.2 - 11.8 FL    NRBC 0.0 0  WBC    ABSOLUTE NRBC 0.00 0.00 - 0.01 K/uL    NEUTROPHILS 74 (H) 40 - 73 %    LYMPHOCYTES 14 (L) 21 - 52 %    MONOCYTES 11 (H) 3 - 10 %    EOSINOPHILS 0 0 - 5 %    BASOPHILS 0 0 - 2 %    IMMATURE GRANULOCYTES 0 0.0 - 0.5 %    ABS. NEUTROPHILS 1.9 1.8 - 8.0 K/UL    ABS. LYMPHOCYTES 0.4 (L) 0.9 - 3.6 K/UL    ABS. MONOCYTES 0.3 0.05 - 1.2 K/UL    ABS. EOSINOPHILS 0.0 0.0 - 0.4 K/UL    ABS. BASOPHILS 0.0 0.0 - 0.1 K/UL    ABS. IMM. GRANS. 0.0 0.00 - 0.04 K/UL    DF AUTOMATED     METABOLIC PANEL, COMPREHENSIVE    Collection Time: 11/12/22  8:15 PM   Result Value Ref Range    Sodium 139 136 - 145 mmol/L    Potassium 3.6 3.5 - 5.5 mmol/L    Chloride 107 100 - 111 mmol/L    CO2 20 (L) 21 - 32 mmol/L    Anion gap 12 3.0 - 18 mmol/L    Glucose 159 (H) 74 - 99 mg/dL    BUN 87 (H) 7.0 - 18 MG/DL    Creatinine 5.62 (H) 0.6 - 1.3 MG/DL    BUN/Creatinine ratio 15 12 - 20      eGFR 9 (L) >60 ml/min/1.73m2    Calcium 7.9 (L) 8.5 - 10.1 MG/DL    Bilirubin, total 1.9 (H) 0.2 - 1.0 MG/DL    ALT (SGPT) 38 16 - 61 U/L    AST (SGOT) 27 10 - 38 U/L    Alk.  phosphatase 96 45 - 117 U/L    Protein, total 5.6 (L) 6.4 - 8.2 g/dL    Albumin 2.4 (L) 3.4 - 5.0 g/dL    Globulin 3.2 2.0 - 4.0 g/dL    A-G Ratio 0.8 0.8 - 1.7     NT-PRO BNP    Collection Time: 11/12/22  8:15 PM   Result Value Ref Range    NT pro-BNP 4,350 (H) 0 - 1,800 PG/ML   TROPONIN-HIGH SENSITIVITY    Collection Time: 11/12/22  8:15 PM   Result Value Ref Range    Troponin-High Sensitivity 175 (HH) 0 - 78 ng/L   MAGNESIUM    Collection Time: 11/12/22  8:15 PM   Result Value Ref Range    Magnesium 2.0 1.6 - 2.6 mg/dL   URINALYSIS W/ RFLX MICROSCOPIC    Collection Time: 11/12/22 10:00 PM   Result Value Ref Range    Color YELLOW      Appearance CLEAR      Specific gravity 1.012 1.005 - 1.030      pH (UA) 5.0 5.0 - 8.0      Protein Negative NEG mg/dL    Glucose Negative NEG mg/dL    Ketone Negative NEG mg/dL    Bilirubin Negative NEG      Blood SMALL (A) NEG      Urobilinogen 0.2 0.2 - 1.0 EU/dL    Nitrites Negative NEG      Leukocyte Esterase MODERATE (A) NEG     URINE MICROSCOPIC ONLY    Collection Time: 11/12/22 10:00 PM   Result Value Ref Range    WBC 40 to 50 0 - 5 /hpf    RBC 4 to 10 0 - 5 /hpf    Epithelial cells Negative 0 - 5 /lpf    Bacteria 3+ (A) NEG /hpf   METABOLIC PANEL, COMPREHENSIVE    Collection Time: 11/13/22  6:55 AM   Result Value Ref Range    Sodium 139 136 - 145 mmol/L    Potassium 3.3 (L) 3.5 - 5.5 mmol/L    Chloride 106 100 - 111 mmol/L    CO2 22 21 - 32 mmol/L    Anion gap 11 3.0 - 18 mmol/L    Glucose 140 (H) 74 - 99 mg/dL    BUN 90 (H) 7.0 - 18 MG/DL    Creatinine 5.88 (H) 0.6 - 1.3 MG/DL    BUN/Creatinine ratio 15 12 - 20      eGFR 9 (L) >60 ml/min/1.73m2    Calcium 8.0 (L) 8.5 - 10.1 MG/DL    Bilirubin, total 2.0 (H) 0.2 - 1.0 MG/DL    ALT (SGPT) 32 16 - 61 U/L    AST (SGOT) 21 10 - 38 U/L    Alk.  phosphatase 85 45 - 117 U/L    Protein, total 5.1 (L) 6.4 - 8.2 g/dL    Albumin 2.2 (L) 3.4 - 5.0 g/dL    Globulin 2.9 2.0 - 4.0 g/dL    A-G Ratio 0.8 0.8 - 1.7     CBC WITH AUTOMATED DIFF    Collection Time: 11/13/22  6:55 AM   Result Value Ref Range    WBC 2.2 (L) 4.6 - 13.2 K/uL    RBC 2.89 (L) 4.35 - 5.65 M/uL    HGB 10.1 (L) 13.0 - 16.0 g/dL    HCT 29.5 (L) 36.0 - 48.0 %    .1 (H) 78.0 - 100.0 FL    MCH 34.9 (H) 24.0 - 34.0 PG    MCHC 34.2 31.0 - 37.0 g/dL    RDW 14.6 (H) 11.6 - 14.5 %    PLATELET 49 (L) 383 - 420 K/uL    MPV 10.8 9.2 - 11.8 FL    NRBC 0.0 0  WBC    ABSOLUTE NRBC 0.00 0.00 - 0.01 K/uL    NEUTROPHILS 73 40 - 73 %    LYMPHOCYTES 15 (L) 21 - 52 %    MONOCYTES 11 (H) 3 - 10 %    EOSINOPHILS 1 0 - 5 %    BASOPHILS 0 0 - 2 %    IMMATURE GRANULOCYTES 0 0.0 - 0.5 %    ABS. NEUTROPHILS 1.7 (L) 1.8 - 8.0 K/UL    ABS. LYMPHOCYTES 0.3 (L) 0.9 - 3.6 K/UL    ABS. MONOCYTES 0.2 0.05 - 1.2 K/UL    ABS. EOSINOPHILS 0.0 0.0 - 0.4 K/UL    ABS. BASOPHILS 0.0 0.0 - 0.1 K/UL    ABS. IMM. GRANS. 0.0 0.00 - 0.04 K/UL    DF AUTOMATED      PLATELET COMMENTS DECREASED PLATELETS      RBC COMMENTS OVALOCYTES  1+       MAGNESIUM    Collection Time: 11/13/22  6:55 AM   Result Value Ref Range    Magnesium 2.1 1.6 - 2.6 mg/dL   TROPONIN-HIGH SENSITIVITY    Collection Time: 11/13/22  6:55 AM   Result Value Ref Range    Troponin-High Sensitivity 133 (HH) 0 - 78 ng/L   GLUCOSE, POC    Collection Time: 11/13/22  8:23 AM   Result Value Ref Range    Glucose (POC) 130 (H) 70 - 110 mg/dL         Radiology:   XR CHEST PORT    Result Date: 11/12/2022  EXAM: One view chest x-ray CLINICAL INDICATION/HISTORY: Dyspnea and lower extremity swelling. COMPARISON: 09/06/2022. TECHNIQUE: Single AP view of the chest was obtained. _______________ FINDINGS: HEART, VESSELS, MEDIASTINUM: Heart size is stable. No vascular congestion. There is atherosclerosis of the thoracic aorta. LUNGS, PLEURAL SPACES: The lungs are clear. No effusion or pneumothorax. BONY THORAX, SOFT TISSUES: Unremarkable. _______________     No acute cardiopulmonary process. Phillip Boast.  Alissa Mackey MD, PhD, [place]   Phone: [phone]  Pager: [phone]

## 2022-11-13 NOTE — H&P
History & Physical    Patient: Nathalia Hurtado MRN: [id number]  CSN: [id number]    YOB: 1940  Age: 78 y.o. Sex: male      DOA: 11/12/2022    Chief Complaint:   Chief Complaint   Patient presents with    Peripheral Edema    Shortness of Breath          HPI:     Nathalia Hurtado is a 78 y.o.  male who has history of Crohn's disease, chronic kidney disease, aortic stenosis severe requiring valvular surgery presents to the emergency room with complaints of increasing lower extremity edema and shortness of breath despite adjusting his diuretic regimen at home. In the emergency room he was found to be in acute kidney failure with increasing creatinine to 5 from a baseline of 2.5 he also had some mild hypokalemia with troponin elevation nephrology was consulted and recommended Lasix IV 3 times daily as well as Zaroxolyn 10 mg daily patient was recently discharged from the hospital 1 month ago with metabolic encephalopathy from UTI  Unfortunately he has not been able to have his aortic valve surgery due to low platelets he was recently started on Promacta 50 mg daily and taken off of his prednisone 80 mg daily  He has had some worsening diarrhea and has already had 3 stools since being in our hospital  He is followed by Dr. [facility] for thrombocytopenia  Past Medical History:   Diagnosis Date    Bladder calculus 2015    Chronic kidney disease     H/O kidney stones    Crohn disease ([address]     Foot drop     left    Hypertension     Kidney calculi        Past Surgical History:   Procedure Laterality Date    HX BACK SURGERY      3 back surgery nola and screw    HX CATARACT REMOVAL Bilateral     HX GI      bowel [id number]    HX GI      hemorrhoidectomy    HX UROLOGICAL      kidney stone removal       No family history on file.     Social History     Socioeconomic History    Marital status:      Spouse name: Randy Vega   Tobacco Use    Smoking status: Former     Types: Cigarettes     Quit date: 2000     Years since quittin.2    Smokeless tobacco: Never   Vaping Use    Vaping Use: Never used   Substance and Sexual Activity    Alcohol use: No    Drug use: No    Sexual activity: Never   Other Topics Concern     Service Yes     Comment: 4 yrs [employer]    Blood Transfusions No    Caffeine Concern No    Occupational Exposure No    Hobby Hazards No    Sleep Concern No    Stress Concern No    Weight Concern No    Special Diet No    Back Care No    Exercise Yes    Bike Helmet Yes    Seat Belt Yes    Self-Exams No       Prior to Admission medications    Medication Sig Start Date End Date Taking? Authorizing Provider   predniSONE (DELTASONE) 20 mg tablet TAKE 4 TABLETS BY MOUTH DAILY. TAKE WITH FOOD TO PREVENT STOMACH UPSET 10/3/22   Other, MD Yaritza   doxazosin (CARDURA) 4 mg tablet Take 1 Tablet by mouth nightly. Indications: enlarged prostate with urination problem, high blood pressure 22   Jakob Goyal MD   magnesium oxide (MAG-OX) 400 mg tablet Take 400 mg by mouth nightly. Provider, Historical   cholestyramine light (QUESTRAN LITE) 4 gram packet Take 4 g by mouth once. Provider, Historical   desloratadine (CLARINEX) 5 mg tablet Take 5 mg by mouth nightly. Indications: ALLERGIC RHINITIS    Provider, Historical   METOPROLOL SUCCINATE PO Take 25 mg by mouth every evening. Provider, Historical   ferrous sulfate 325 mg (65 mg iron) tablet Take  by mouth Daily (before breakfast). Provider, Historical       No Known Allergies      Review of Systems  GENERAL: Patient alert, awake and oriented times 3, able to communicate full sentences and not in distress. HEENT: No change in vision, no earache, tinnitus, sore throat or sinus congestion. NECK: No pain or stiffness. PULMONARY:+ shortness of breath, cough or wheeze. Cardiovascular: no pnd or orthopnea, no CP  GASTROINTESTINAL: No abdominal pain, nausea, vomiting +diarrhea, melena or bright red blood per rectum.    GENITOURINARY: No urinary frequency, urgency, hesitancy or dysuria. Recent bad UTI  MUSCULOSKELETAL: + joint or muscle pain, no back pain, no recent trauma. Bilateral leg erythema and swelling  DERMATOLOGIC: +rash, no itching, no lesions. ENDOCRINE: No polyuria, polydipsia, no heat or cold intolerance. No recent change in weight. HEMATOLOGICAL: No anemia or easy bruising or bleeding. NEUROLOGIC: No headache, seizures, numbness, tingling +weakness. Physical Exam:     Physical Exam:  Visit Vitals  BP (!) 108/57   Pulse 79   Temp 98 °F (36.7 °C)   Resp 16   Ht 6' 1\" (1.854 m)   Wt 124.7 kg (275 lb)   SpO2 98%   BMI 36.28 kg/m²      O2 Device: None (Room air)    Temp (24hrs), Av °F (36.7 °C), Min:98 °F (36.7 °C), Max:98 °F (36.7 °C)    No intake/output data recorded. No intake/output data recorded. General:  Alert, cooperative, no distress, appears stated age. Head: Normocephalic, without obvious abnormality, atraumatic. Eyes:  Conjunctivae/corneas clear. PERRL, EOMs intact. Nose: Nares normal. No drainage or sinus tenderness. Neck: Supple, symmetrical, trachea midline, no adenopathy, thyroid: no enlargement, no carotid bruit and positive JVD. Lungs:   Clear to auscultation bilaterally. Crackles in base with audible wheezing   Heart:  Regular rate and rhythm, S1, S2 normal.  Large 3 out of 6 systolic murmur     Abdomen: Soft, non-tender. Bowel sounds normal.  Distended abdomen   Extremities: Extremities normal, atraumatic, no cyanosis +3 edema. Pulses: 2+ and symmetric all extremities. n    Neurologic: AAOx3, No focal motor or sensory deficit. Labs Reviewed: All lab results for the last 24 hours reviewed. and EKG    Procedures/imaging: see electronic medical records for all procedures/Xrays and details which were not copied into this note but were reviewed prior to creation of Plan      Assessment/Plan     Active Problems:    * No active hospital problems. *     1.   Acute renal failure on chronic kidney disease  Avoid NSAIDs  Gillis placed  Renal ultrasound for the a.m. Strict I's and O's  Daily weight  IV Lasix and Zaroxolyn    3. Severe aortic stenosis  Trend troponins  Cardiology is consulted  Holding ACE and ARB right now we will defer on echo  Last EF was 60 to 65% with grade 1 diastolic dysfunction    4. Thrombocytopenia  He is not on high-dose prednisone   Avoid heparin  SCDs for DVT prevention  Started on Promacta 50mg daily recently I have called pharmacy we do not have this patient will need to bring his home supply he has notified his wife we will get a hematology consult to see if this has contributed to his renal failure and diarrhea    5. Hypertension  Is restarted on his Cardura and metoprolol    6. History of Crohn's disease supportive care  Continue Questran daily for diarrhea  Since patient has been on prolonged course for of antibiotics for UTI we will start on probiotics check cultures for C. difficile and enteric pathogen    7. Hyperglycemia likely ggravated by prednisone he is placed on a sliding scale insulin he may have diabetes although his last A1c was 6.2    8. Diarrhea  Check stool cultures  Cdiff   Start pro biotics continue Questran    9.   Recent UTI causing encephalopathy we will check urine analysis  STARTED ON Rocephin until cultures are back   Palliative care consult  DVT/GI Prophylaxis:  SCDs and Protonix        Jeffrey Gonzales MD  11/12/2022 10:43 PM

## 2022-11-13 NOTE — ED TRIAGE NOTES
Pt presents to ED via w/c from triage with c/o bilateral leg swelling and SOB for 1 week;   Neelam pt;  pt denies any H/O CHF

## 2022-11-13 NOTE — CONSULTS
RENAL CONSULT  2022    Patient:  Ed Antony :  1940  Gender:  male  MRN #:  [id number]    Consulting Physician:  Elian Guevara DO,  Assessment:    )Active Problems: Aortic stenosis (10/7/2022)      Peripheral edema (2022)      Hypoxia (2022)      NAPOLEON (acute kidney injury) ([facility] [address] (2022)    Thrombocytopena    Plan:    Getting Renal US, if not obstructive process then most likely due low cardiac out put   Increased diuretics  Gillis for accurate urin out put  Urine lytes    History of Present Illness:  Ed Antony is a 78y.o. year old male with ongoing CKD baseline cr of 2.3 range. Has been admitted with anasarca, fluid weight gain voer 2 weeks, SOB. I was asked to see. PT denies any NSAID's, dysuria. Pt see in [address] suits. Past Medical History:   Diagnosis Date    Bladder calculus     Chronic kidney disease     H/O kidney stones    Crohn disease ([facility] [address]     Foot drop     left    Hypertension     Kidney calculi      Past Surgical History:   Procedure Laterality Date    HX BACK SURGERY      3 back surgery nola and screw    HX CATARACT REMOVAL Bilateral     HX GI      bowel [id number]    HX GI      hemorrhoidectomy    HX UROLOGICAL      kidney stone removal     No family history on file.   No Known Allergies  Current Facility-Administered Medications   Medication Dose Route Frequency Provider Last Rate Last Admin    doxazosin (CARDURA) tablet 4 mg  4 mg Oral QHS Julia Luo MD   4 mg at 22 015    magnesium oxide (MAG-OX) tablet 400 mg  400 mg Oral QHS Julia Luo MD   400 mg at 22 015    metoprolol succinate (TOPROL-XL) XL tablet 25 mg  25 mg Oral QPM Julia Luo MD        ferrous sulfate tablet 325 mg  1 Tablet Oral BID WITH MEALS Julia Luo MD        cetirizine (ZYRTEC) tablet 10 mg  10 mg Oral QHS Julia Luo MD   10 mg at 22 015    furosemide (LASIX) injection 80 mg  80 mg IntraVENous TID Marquis Luo MD        [START ON 11/14/2022] metOLazone (ZAROXOLYN) tablet 10 mg  10 mg Oral DAILY Marquis Luo MD        insulin lispro (HUMALOG) injection   SubCUTAneous AC&HS Marquis Luo MD        glucose chewable tablet 16 g  4 Tablet Oral PRN Marquis Luo MD        glucagon (GLUCAGEN) injection 1 mg  1 mg IntraMUSCular PRN Marquis Luo MD        dextrose 10% infusion 0-250 mL  0-250 mL IntraVENous PRN Marquis Luo MD        eltrombopag (PROMACTA) tablet 50 mg (Patient Supplied)  50 mg Oral ACB Marquis Luo MD        cefTRIAXone (ROCEPHIN) 1 g in 0.9% sodium chloride (MBP/ADV) 50 mL MBP  1 g IntraVENous Q24H Marquis Luo MD        Lactobacillus Acidoph & Bulgar [facility]) tablet 2 Tablet  2 Tablet Oral BID Marquis Luo MD           Review of Symptoms:  Below symptoms negative if not in [place].   Consitutional Symptoms: Fever, weight loss, weight gain, fatigue  Eyes:  pain, sudden vision loss, blurry vision, double vision             bleeding nose, sore throat, hoarseness  GI: nausea, vomiting, diarrhea, pain, blood in stool  Pulmonary; Cough, Shortness of breath, Wheezing's  Cardiac: chest pain, palpitation  Musculoskeletal: difficulty walking, falls, pain over muscle, joint pain, weakness  : dysuria, blood in urine, pain with urination, difficulty voiding  Neurologia: dizziness, syncope, focal weakness, difficulty speaking  Integumentary: rash, redness, ulcer   Psychiatric:  Depression suicidal ideation    Objective:  Visit Vitals  /61 (BP 1 Location: Right upper arm, BP Patient Position: At rest)   Pulse 80   Temp 97.3 °F (36.3 °C)   Resp 18   Ht 6' 1\" (1.854 m)   Wt 124.7 kg (275 lb)   SpO2 99%   BMI 36.28 kg/m²         Well developed and groomed  Eyes: anicteric, no subconjectival hemorrahge, eye lid without lesion  Ears, nose, mouth and throat without visible mass, scars, lesion  Neck: supple    Cardiovascular:  Normal rate,  GI: soft, nontender,  Nomral bowel sound  Musculoskeletal: No clubbing cynosis, no petechia  Skin: no rash, lesion or ulcers, b/l LW erythematous rash, no purpuric no ithcing and non tender  Neruoligcal: no focal dificit grossly  Psychicatric: good judgment and insights, good memory. No susana affect. Non anxiouse. Laboratory Data:  Lab Results   Component Value Date    BUN 90 (H) 11/13/2022    BUN 87 (H) 11/12/2022    BUN 47 (H) 10/07/2022     11/13/2022     11/12/2022     10/07/2022    CO2 22 11/13/2022    CO2 20 (L) 11/12/2022    CO2 25 10/07/2022     Lab Results   Component Value Date    WBC 2.2 (L) 11/13/2022    HGB 10.1 (L) 11/13/2022    HCT 29.5 (L) 11/13/2022       Imaging have been reviewed:  )XR CHEST PA LAT    Result Date: 9/6/2022  EXAM: CHEST RADIOGRAPH CLINICAL INDICATION/HISTORY: Shortness of breath -Additional: None COMPARISON: None TECHNIQUE: Portable frontal view of the chest _______________ FINDINGS: SUPPORT DEVICES: None. HEART AND MEDIASTINUM: No appreciable cardiomegaly. Remaining mediastinal contours within normal limits. LUNGS AND PLEURAL SPACES: Lung volumes are hypoinflated. There are bilateral prominent interstitial lung markings, greatest within the lung bases. No pneumothorax or pleural effusion. BONY THORAX AND SOFT TISSUES: Thoracic and lumbar spine stabilization rods. _______________     Mild pulmonary edema    MRI BRAIN WO CONT    Result Date: 10/7/2022  EXAM: MRI brain without contrast 10/7/2022. CLINICAL INDICATION/HISTORY: Confusion. COMPARISON: CT scan of the head from 10/6/2022 TECHNIQUE: Multiplanar multisequential images of the brain were obtained without contrast. _______________ FINDINGS: BRAIN AND POSTERIOR FOSSA: Mild to moderate atrophy and moderate chronic small vessel changes are present throughout the periventricular and subcortical white matter of both cerebral hemispheres.   There is no intracranial hemorrhage, mass effect, or midline shift. There are no significant additional areas of abnormal parenchymal signal. There are no areas of restricted diffusion to suggest acute infarct. EXTRA-AXIAL SPACES AND MENINGES: There are no abnormal extra-axial fluid collections. Algis Broaden VASCULAR: The visualized portions of the intracranial carotid and vertebrobasilar systems demonstrate patent appearing flow voids. CALVARIA: Unremarkable. SINUSES: Clear, as visualized. CRANIOCERVICAL JUNCTION: Within normal limits for age. OTHER: None. _______________     1. Mild to moderate atrophy and moderate chronic small vessel changes. 2.  Otherwise, unremarkable evaluation. Specifically, no acute intracranial abnormalities are identified. CT HEAD WO CONT    Result Date: 10/6/2022  EXAMINATION:  CT head noncontrast COMPARISON: None HISTORY: Confusion TECHNIQUE: Noncontrasted axial images were obtained through the patient's brain from the vertex of the skull through the skull base. Bone and soft tissue windows were reviewed. One or more dose reduction techniques were used on this CT: automated exposure control, adjustment of the mAs and/or kVp according to patient size, and iterative reconstruction techniques. The specific techniques used on this CT exam have been documented in the patient's electronic medical record. Digital Imaging and Communications in Medicine (DICOM) format image data are available to nonaffiliated external healthcare facilities or entities on a secure, media free, reciprocally searchable basis with patient authorization for at least a 12-month period after this study. Evette Durán FINDINGS: Brain architecture is normal. No mass effect, midline shift or hemorrhage. Ventricles are normal in size, position and configuration. No abnormal extra-axial fluid collections are seen.  Scattered and confluent foci of decreased attenuation noted of the periventricular white matter, nonspecific, but most likely sequelae of chronic microvascular disease. No territorial signs of infarct. The bony calvarium appears intact without acute displaced fracture. The visualized paranasal sinuses and mastoid air cells are aerated. 1. No acute intracranial pathology. Above code S stroke alert results discussed with patient's ER provider 5:03 PM 10/6/2022. XR CHEST PORT    Result Date: 11/12/2022  EXAM: One view chest x-ray CLINICAL INDICATION/HISTORY: Dyspnea and lower extremity swelling. COMPARISON: 09/06/2022. TECHNIQUE: Single AP view of the chest was obtained. _______________ FINDINGS: HEART, VESSELS, MEDIASTINUM: Heart size is stable. No vascular congestion. There is atherosclerosis of the thoracic aorta. LUNGS, PLEURAL SPACES: The lungs are clear. No effusion or pneumothorax. BONY THORAX, SOFT TISSUES: Unremarkable. _______________     No acute cardiopulmonary process. ECHO ADULT COMPLETE    Result Date: 10/7/2022    Left Ventricle: Normal left ventricular systolic function with a visually estimated EF of 60 - 65%. Left ventricle size is normal. Mildly increased wall thickness. Findings consistent with mild concentric hypertrophy. Normal wall motion. Grade I diastolic dysfunction present with normal LV EF. Left Atrium: Left atrium is mildly dilated. Aortic Valve: Tricuspid valve. Mildly calcified cusp. Mild regurgitation. Moderate stenosis of the aortic valve. Tricuspid Valve: Unable to assess RVSP due to inadequate or insignificant tricuspid regurgitation. Interatrial Septum: No interatrial shunt visualized with color Doppler. Suboptimal bubble study. Agitated saline study was negative with and without provocation.      Total time spent 61 minutes      )Adán Pillai DO,

## 2022-11-13 NOTE — CONSULTS
[facility] Consult Note      Patient: Tate Gomez MRN: [id number]  SSN: [id number]    YOB: 1940  Age: 78 y.o. Sex: male    Date of Consultation: 11/12/2022  Referring Physician: Kanu Stanford  Reason for Consultation: ABnormal troponin    Chief complain: Leg swelling    HPI:  78-year-old gentleman came with complaining of worsening of bilateral lower extremity swelling. He has been taking Bumex 3 times a day for last few days without any significant improvement in leg swelling. He is also complaining of bilateral lower extremity skin discoloration. Is complaining of abdominal distension. He is complaining of shortness of breath on exertion. Denies any orthopnea or PND. Denies any chest pain on exertion. Denies any dizziness, palpitation, presyncope or syncope. He is ex-smoker and quit smoking long time ago.   Cardiology consult called for evaluation of CHF and abnormal troponin    Past Medical History:   Diagnosis Date    Bladder calculus 2015    Chronic kidney disease     H/O kidney stones    Crohn disease ([address]     Foot drop     left    Hypertension     Kidney calculi      Past Surgical History:   Procedure Laterality Date    HX BACK SURGERY      3 back surgery nola and screw    HX CATARACT REMOVAL Bilateral     HX GI      bowel [id number]    HX GI      hemorrhoidectomy    HX UROLOGICAL      kidney stone removal     Current Facility-Administered Medications   Medication Dose Route Frequency    doxazosin (CARDURA) tablet 4 mg  4 mg Oral QHS    magnesium oxide (MAG-OX) tablet 400 mg  400 mg Oral QHS    metoprolol succinate (TOPROL-XL) XL tablet 25 mg  25 mg Oral QPM    ferrous sulfate tablet 325 mg  1 Tablet Oral BID WITH MEALS    cetirizine (ZYRTEC) tablet 10 mg  10 mg Oral QHS    furosemide (LASIX) injection 80 mg  80 mg IntraVENous TID    [START ON 11/14/2022] metOLazone (ZAROXOLYN) tablet 10 mg  10 mg Oral DAILY    insulin lispro (HUMALOG) injection   SubCUTAneous AC&HS    glucose chewable tablet 16 g  4 Tablet Oral PRN    glucagon (GLUCAGEN) injection 1 mg  1 mg IntraMUSCular PRN    dextrose 10% infusion 0-250 mL  0-250 mL IntraVENous PRN    eltrombopag (PROMACTA) tablet 50 mg (Patient Supplied)  50 mg Oral ACB    cefTRIAXone (ROCEPHIN) 1 g in 0.9% sodium chloride (MBP/ADV) 50 mL MBP  1 g IntraVENous Q24H    Lactobacillus Acidoph & Bulgar (FLORANEX) tablet 2 Tablet  2 Tablet Oral BID    [START ON 11/14/2022] spironolactone (ALDACTONE) tablet 50 mg  50 mg Oral DAILY       Allergies and Intolerances:   No Known Allergies    Family History:   No family history on file. Social History:   He  reports that he quit smoking about 22 years ago. His smoking use included cigarettes. He has never used smokeless tobacco.  He  reports no history of alcohol use. Review of Systems:     Gen: No fever, chills, malaise, weight loss/gain. Heent: No headache, rhinorrhea, epistaxis, ear pain, hearing loss, sinus pain, neck pain/stiffness, sore throat. Heart: positive shortness of breath on exertion, No chest pain, palpitations, pnd, or orthopnea. Resp: No cough, hemoptysis, wheezing and  dyspnea  GI:  positive  abdominal distension,No nausea, vomiting, diarrhea, constipation, melena or hematochezia. : No urinary obstruction, dysuria or hematuria. Derm: No rash, new skin lesion or pruritis. Musc/skeletal:  positive bone or joint complains. Vasc:  positive edema, no cyanosis or claudication. Endo: No heat/cold intolerance, no polyuria,polydipsia or polyphagia. Neuro: No unilateral weakness, numbness, tingling. No seizures. Heme: No easy bruising or bleeding. Physical:   Patient Vitals for the past 6 hrs:   Pulse Resp BP SpO2   11/13/22 1540 83 18 122/73 100 %   11/13/22 1149 -- 18 123/66 100 %         Exam:   General Appearance: Comfortable, not using accessory muscles of respiration. HEENT: EVERT. HEAD: Atraumatic  NECK: No JVD, no thyroidomeglay.  CAROTIDS: no bruit  LUNGS: Clear bilaterally. HEART: S1+S2 audible,  3/6 systolic murmur in aortic and tricuspid, no pericardial rub. ABD: Non-tender, BS Audible    EXT:  bilateral lower extremity 4+ edema, and no cyanosis. VASCULAR EXAM: Pulses are intact. PSYCHIATRIC EXAM: Mood is appropriate. MUSCULOSKELETAL: Grossly no joint deformity.   NEUROLOGICAL: AAO times 3, Motor and sensory sytem intact       Review of Data:   LABS:   Lab Results   Component Value Date/Time    WBC 2.2 (L) 11/13/2022 06:55 AM    HGB 10.1 (L) 11/13/2022 06:55 AM    HCT 29.5 (L) 11/13/2022 06:55 AM    PLATELET 49 (L) 61/77/8440 06:55 AM     Lab Results   Component Value Date/Time    Sodium 139 11/13/2022 06:55 AM    Potassium 3.3 (L) 11/13/2022 06:55 AM    Chloride 106 11/13/2022 06:55 AM    CO2 22 11/13/2022 06:55 AM    Glucose 140 (H) 11/13/2022 06:55 AM    BUN 90 (H) 11/13/2022 06:55 AM    Creatinine 5.88 (H) 11/13/2022 06:55 AM     Lab Results   Component Value Date/Time    Cholesterol, total 102 10/07/2022 03:45 AM    HDL Cholesterol 42 10/07/2022 03:45 AM    LDL, calculated 47.6 10/07/2022 03:45 AM    Triglyceride 62 10/07/2022 03:45 AM     No components found for: GPT  Lab Results   Component Value Date/Time    Hemoglobin A1c 6.9 (H) 11/13/2022 06:55 AM         Cardiology Procedures:   Results for orders placed or performed during the hospital encounter of 11/12/22   EKG, 12 LEAD, INITIAL   Result Value Ref Range    Ventricular Rate 81 BPM    Atrial Rate 81 BPM    P-R Interval 202 ms    QRS Duration 144 ms    Q-T Interval 428 ms    QTC Calculation (Bezet) 497 ms    Calculated P Axis 68 degrees    Calculated R Axis 20 degrees    Calculated T Axis -21 degrees    Diagnosis       Normal sinus rhythm  Right bundle branch block  Possible Inferior infarct (cited on or before 06-SEP-2022)   Abnormal EKG             Impression / Plan:    Patient Active Problem List   Diagnosis Code    Altered mental status U45.66    Acute metabolic encephalopathy D94.88 Thrombocytopenia (HCC) D69.6    Aortic stenosis I35.0    CKD (chronic kidney disease) N18.9    Acute kidney injury superimposed on chronic kidney disease (HCC) N17.9, N18.9    Peripheral edema R60.9    Hypoxia R09.02    NAPOLEON (acute kidney injury) ([address] N17.9      Acute on chronic renal failure   Nonrheumatic aortic stenosis   Bilateral lower extremity leg edema   Abnormal troponin no clear evidence of acute coronary syndrome, could be demand ischemia    Echocardiogram was done in 10/2022 revealed      Left Ventricle: Normal left ventricular systolic function with a visually estimated EF of 60 - 65%. Left ventricle size is normal. Mildly increased wall thickness. Findings consistent with mild concentric hypertrophy. Normal wall motion. Grade I diastolic dysfunction present with normal LV EF. Left Atrium: Left atrium is mildly dilated. Aortic Valve: Tricuspid valve. Mildly calcified cusp. Mild regurgitation. Moderate stenosis of the aortic valve. Tricuspid Valve: Unable to assess RVSP due to inadequate or insignificant tricuspid regurgitation. Interatrial Septum: No interatrial shunt visualized with color Doppler. Suboptimal bubble study. Agitated saline study was negative with and without provocation. Plan:     Continue IV Lasix and metolazone. Strict input output. Monitor renal function and electrolytes.     Will follow-up      Signed By: Flores Ortiz MD     November 13, 2022

## 2022-11-14 LAB
ALBUMIN SERPL-MCNC: 2.1 G/DL (ref 3.4–5)
ALBUMIN/GLOB SERPL: 0.7 {RATIO} (ref 0.8–1.7)
ALP SERPL-CCNC: 88 U/L (ref 45–117)
ALT SERPL-CCNC: 33 U/L (ref 16–61)
ANION GAP SERPL CALC-SCNC: 8 MMOL/L (ref 3–18)
AST SERPL-CCNC: 20 U/L (ref 10–38)
BASOPHILS # BLD: 0 K/UL (ref 0–0.1)
BASOPHILS NFR BLD: 0 % (ref 0–2)
BILIRUB SERPL-MCNC: 1.3 MG/DL (ref 0.2–1)
BUN SERPL-MCNC: 92 MG/DL (ref 7–18)
BUN/CREAT SERPL: 15 (ref 12–20)
C DIFF GDH STL QL: NEGATIVE
C DIFF TOX A+B STL QL IA: NEGATIVE
CALCIUM SERPL-MCNC: 7.8 MG/DL (ref 8.5–10.1)
CAMPYLOBACTER SPECIES, DNA: NEGATIVE
CHLORIDE SERPL-SCNC: 107 MMOL/L (ref 100–111)
CO2 SERPL-SCNC: 22 MMOL/L (ref 21–32)
CREAT SERPL-MCNC: 6.09 MG/DL (ref 0.6–1.3)
DIFFERENTIAL METHOD BLD: ABNORMAL
ENTEROTOXIGEN E COLI, DNA: NEGATIVE
EOSINOPHIL # BLD: 0 K/UL (ref 0–0.4)
EOSINOPHIL NFR BLD: 2 % (ref 0–5)
ERYTHROCYTE [DISTWIDTH] IN BLOOD BY AUTOMATED COUNT: 14.7 % (ref 11.6–14.5)
GLOBULIN SER CALC-MCNC: 3.1 G/DL (ref 2–4)
GLUCOSE BLD STRIP.AUTO-MCNC: 120 MG/DL (ref 70–110)
GLUCOSE BLD STRIP.AUTO-MCNC: 126 MG/DL (ref 70–110)
GLUCOSE BLD STRIP.AUTO-MCNC: 156 MG/DL (ref 70–110)
GLUCOSE BLD STRIP.AUTO-MCNC: 221 MG/DL (ref 70–110)
GLUCOSE SERPL-MCNC: 134 MG/DL (ref 74–99)
HCT VFR BLD AUTO: 30 % (ref 36–48)
HGB BLD-MCNC: 10 G/DL (ref 13–16)
IMM GRANULOCYTES # BLD AUTO: 0 K/UL (ref 0–0.04)
IMM GRANULOCYTES NFR BLD AUTO: 1 % (ref 0–0.5)
INTERPRETATION: NORMAL
LYMPHOCYTES # BLD: 0.4 K/UL (ref 0.9–3.6)
LYMPHOCYTES NFR BLD: 15 % (ref 21–52)
MAGNESIUM SERPL-MCNC: 2.2 MG/DL (ref 1.6–2.6)
MCH RBC QN AUTO: 34.5 PG (ref 24–34)
MCHC RBC AUTO-ENTMCNC: 33.3 G/DL (ref 31–37)
MCV RBC AUTO: 103.4 FL (ref 78–100)
MONOCYTES # BLD: 0.3 K/UL (ref 0.05–1.2)
MONOCYTES NFR BLD: 9 % (ref 3–10)
NEUTS SEG # BLD: 2 K/UL (ref 1.8–8)
NEUTS SEG NFR BLD: 73 % (ref 40–73)
NRBC # BLD: 0 K/UL (ref 0–0.01)
NRBC BLD-RTO: 0 PER 100 WBC
P SHIGELLOIDES DNA STL QL NAA+PROBE: NEGATIVE
PLATELET # BLD AUTO: 54 K/UL (ref 135–420)
PMV BLD AUTO: 11.5 FL (ref 9.2–11.8)
POTASSIUM SERPL-SCNC: 3.2 MMOL/L (ref 3.5–5.5)
PROT SERPL-MCNC: 5.2 G/DL (ref 6.4–8.2)
RBC # BLD AUTO: 2.9 M/UL (ref 4.35–5.65)
SALMONELLA SPECIES, DNA: NEGATIVE
SHIGA TOXIN PRODUCING, DNA: NEGATIVE
SHIGELLA SP+EIEC IPAH STL QL NAA+PROBE: NEGATIVE
SODIUM SERPL-SCNC: 137 MMOL/L (ref 136–145)
VIBRIO SPECIES, DNA: NEGATIVE
WBC # BLD AUTO: 2.7 K/UL (ref 4.6–13.2)
Y. ENTEROCOLITICA, DNA: NEGATIVE

## 2022-11-14 PROCEDURE — 74011250637 HC RX REV CODE- 250/637: Performed by: INTERNAL MEDICINE

## 2022-11-14 PROCEDURE — 74011250636 HC RX REV CODE- 250/636: Performed by: INTERNAL MEDICINE

## 2022-11-14 PROCEDURE — 74011000258 HC RX REV CODE- 258: Performed by: INTERNAL MEDICINE

## 2022-11-14 PROCEDURE — 36415 COLL VENOUS BLD VENIPUNCTURE: CPT

## 2022-11-14 PROCEDURE — 74011250637 HC RX REV CODE- 250/637: Performed by: HOSPITALIST

## 2022-11-14 PROCEDURE — 80053 COMPREHEN METABOLIC PANEL: CPT

## 2022-11-14 PROCEDURE — 85025 COMPLETE CBC W/AUTO DIFF WBC: CPT

## 2022-11-14 PROCEDURE — 82525 ASSAY OF COPPER: CPT

## 2022-11-14 PROCEDURE — 84630 ASSAY OF ZINC: CPT

## 2022-11-14 PROCEDURE — 99222 1ST HOSP IP/OBS MODERATE 55: CPT | Performed by: NURSE PRACTITIONER

## 2022-11-14 PROCEDURE — 97530 THERAPEUTIC ACTIVITIES: CPT

## 2022-11-14 PROCEDURE — 83735 ASSAY OF MAGNESIUM: CPT

## 2022-11-14 PROCEDURE — 65270000046 HC RM TELEMETRY

## 2022-11-14 PROCEDURE — 97162 PT EVAL MOD COMPLEX 30 MIN: CPT

## 2022-11-14 PROCEDURE — 82962 GLUCOSE BLOOD TEST: CPT

## 2022-11-14 PROCEDURE — 74011636637 HC RX REV CODE- 636/637: Performed by: INTERNAL MEDICINE

## 2022-11-14 RX ORDER — SPIRONOLACTONE 100 MG/1
100 TABLET, FILM COATED ORAL DAILY
Status: DISCONTINUED | OUTPATIENT
Start: 2022-11-15 | End: 2022-11-17

## 2022-11-14 RX ORDER — POTASSIUM CHLORIDE 20 MEQ/1
40 TABLET, EXTENDED RELEASE ORAL
Status: COMPLETED | OUTPATIENT
Start: 2022-11-14 | End: 2022-11-14

## 2022-11-14 RX ADMIN — FUROSEMIDE 80 MG: 10 INJECTION, SOLUTION INTRAMUSCULAR; INTRAVENOUS at 17:51

## 2022-11-14 RX ADMIN — Medication 2 UNITS: at 12:43

## 2022-11-14 RX ADMIN — CEFTRIAXONE 1 G: 1 INJECTION, POWDER, FOR SOLUTION INTRAMUSCULAR; INTRAVENOUS at 06:13

## 2022-11-14 RX ADMIN — CETIRIZINE HYDROCHLORIDE 10 MG: 10 TABLET, FILM COATED ORAL at 23:36

## 2022-11-14 RX ADMIN — Medication 4 UNITS: at 17:50

## 2022-11-14 RX ADMIN — SPIRONOLACTONE 50 MG: 25 TABLET ORAL at 09:02

## 2022-11-14 RX ADMIN — METOLAZONE 10 MG: 5 TABLET ORAL at 09:02

## 2022-11-14 RX ADMIN — FERROUS SULFATE TAB 325 MG (65 MG ELEMENTAL FE) 325 MG: 325 (65 FE) TAB at 17:57

## 2022-11-14 RX ADMIN — FUROSEMIDE 80 MG: 10 INJECTION, SOLUTION INTRAMUSCULAR; INTRAVENOUS at 23:36

## 2022-11-14 RX ADMIN — Medication 400 MG: at 23:36

## 2022-11-14 RX ADMIN — DOXAZOSIN MESYLATE 4 MG: 4 TABLET ORAL at 23:36

## 2022-11-14 RX ADMIN — POTASSIUM CHLORIDE 40 MEQ: 1500 TABLET, EXTENDED RELEASE ORAL at 12:43

## 2022-11-14 RX ADMIN — FERROUS SULFATE TAB 325 MG (65 MG ELEMENTAL FE) 325 MG: 325 (65 FE) TAB at 09:04

## 2022-11-14 RX ADMIN — FUROSEMIDE 80 MG: 10 INJECTION, SOLUTION INTRAMUSCULAR; INTRAVENOUS at 09:19

## 2022-11-14 NOTE — PROGRESS NOTES
Phone: [phone]  Paging : [phone]      Hematology / Oncology Progress Note    Admit Date: 11/12/2022    Assessment:     Hem/Onc Issues: Thrombocytopenia, ITP, did not respond to steroid (stopped 10/31/2022); on Promacta since 11/7/2022, Repeat Bone marrow biopsy 10/20/2022   Reasons for Admission:  LE edema and SOB, Acute on chronic renal failure  Other Active Issues:    Crohn's disease  Acute on chronic renal failure    Principal Problem:    NAPOLEON (acute kidney injury) ([address] (11/12/2022)    Active Problems: Aortic stenosis (10/7/2022)      Peripheral edema (11/12/2022)      Hypoxia (11/12/2022)      Plan:   #Pancytopenia  Unclear cause of his pancytopenia based on BMBx, no signs of MDS, leukemia, or lymphoproliverative disorder. ? Autoimmune process. Started on promacta as outpatient on 11/07/22, s/p 1 week and his platelet count is >16G. Will follow for now. - Continue promacta, patient has his own supply. Will need to send back with him at discharge  - Will send for copper, zinc  - Check CBC with diff daily  - Rest per primary team  - Follow up with Dr. Shruthi Baker as outpatient, next appointment was today so will need to re-schedule    Subjectives:   Feels better, denies any new complaints. Objectives:      VITAL SIGNS: Patient Vitals for the past 24 hrs:   BP Temp Pulse Resp SpO2 Weight   11/14/22 0705 109/61 97.4 °F (36.3 °C) 71 18 99 % --   11/14/22 0346 117/73 97.4 °F (36.3 °C) 74 20 100 % --   11/13/22 2322 103/62 98 °F (36.7 °C) 71 18 100 % 127 kg (280 lb)   11/13/22 2039 (!) 147/66 97.6 °F (36.4 °C) 70 18 100 % --   11/13/22 1540 122/73 -- 83 18 100 % --   11/13/22 1149 123/66 -- -- 18 100 % --       GENERAL: normal appearance, no acute distress. HEENT: conjunctivae normal, eyelids normal, PERRLA, anicteric, external ears and nose normal, hearing grossly normal.   NECK: supple, no masses. LUNG: breathing comfortably, lungs clear to auscultation and percussion.    CARDIOVASCULAR: normal heart sounds, heart rhythm regular, no peripheral edema. ABDOMEN: soft. bowel sounds normal, non-tender non distension, no hepatosplenomegaly   PELVIS: pelvic exam deferred. RECTUM: rectal exam deferred. LYMPH NODES: no cervical adenopathy, no axillary adenopathy, no supraclavicular adenopathy, no infraclavicular adenopathy. SKIN: no rash, no petechiae, no ecchymosis, no pallor, no cutaneous nodules. MUSCULOSKELETAL: normal muscle strength. NEUROLOGIC: no focal motor deficit, normal gait, no abnormal mental status. PSYCHIATRIC: oriented to person, time and place, mood and affect appropriate.       Medications:      Current Medications:    Current Facility-Administered Medications   Medication Dose Route Frequency    doxazosin (CARDURA) tablet 4 mg  4 mg Oral QHS    magnesium oxide (MAG-OX) tablet 400 mg  400 mg Oral QHS    metoprolol succinate (TOPROL-XL) XL tablet 25 mg  25 mg Oral QPM    ferrous sulfate tablet 325 mg  1 Tablet Oral BID WITH MEALS    cetirizine (ZYRTEC) tablet 10 mg  10 mg Oral QHS    furosemide (LASIX) injection 80 mg  80 mg IntraVENous TID    metOLazone (ZAROXOLYN) tablet 10 mg  10 mg Oral DAILY    insulin lispro (HUMALOG) injection   SubCUTAneous AC&HS    glucose chewable tablet 16 g  4 Tablet Oral PRN    glucagon (GLUCAGEN) injection 1 mg  1 mg IntraMUSCular PRN    dextrose 10% infusion 0-250 mL  0-250 mL IntraVENous PRN    eltrombopag (PROMACTA) tablet 50 mg (Patient Supplied)  50 mg Oral ACB    cefTRIAXone (ROCEPHIN) 1 g in 0.9% sodium chloride (MBP/ADV) 50 mL MBP  1 g IntraVENous Q24H    Lactobacillus Acidoph & Bulgar (FLORANEX) tablet 2 Tablet  2 Tablet Oral BID    spironolactone (ALDACTONE) tablet 50 mg  50 mg Oral DAILY       Allergies:    No Known Allergies      Ancillary Study Results:      Laboratory:    Recent Results (from the past 24 hour(s))   GLUCOSE, POC    Collection Time: 11/13/22 12:48 PM   Result Value Ref Range    Glucose (POC) 141 (H) 70 - 110 mg/dL PROTEIN/CREATININE RATIO, URINE    Collection Time: 11/13/22  1:00 PM   Result Value Ref Range    Protein, urine random 16 (H) <11.9 mg/dL    Creatinine, urine random 53.00 30 - 125 mg/dL    Protein/Creat. urine Ratio 0.3     SODIUM, UR, RANDOM    Collection Time: 11/13/22  1:00 PM   Result Value Ref Range    Sodium,urine random 88 20 - 110 MMOL/L   CREATININE, UR, RANDOM    Collection Time: 11/13/22  1:00 PM   Result Value Ref Range    Creatinine, urine random 56.00 30 - 125 mg/dL   GLUCOSE, POC    Collection Time: 11/13/22  5:23 PM   Result Value Ref Range    Glucose (POC) 168 (H) 70 - 110 mg/dL   GLUCOSE, POC    Collection Time: 11/13/22 10:19 PM   Result Value Ref Range    Glucose (POC) 159 (H) 70 - 926 mg/dL   METABOLIC PANEL, COMPREHENSIVE    Collection Time: 11/14/22  1:00 AM   Result Value Ref Range    Sodium 137 136 - 145 mmol/L    Potassium 3.2 (L) 3.5 - 5.5 mmol/L    Chloride 107 100 - 111 mmol/L    CO2 22 21 - 32 mmol/L    Anion gap 8 3.0 - 18 mmol/L    Glucose 134 (H) 74 - 99 mg/dL    BUN 92 (H) 7.0 - 18 MG/DL    Creatinine 6.09 (H) 0.6 - 1.3 MG/DL    BUN/Creatinine ratio 15 12 - 20      eGFR 8 (L) >60 ml/min/1.73m2    Calcium 7.8 (L) 8.5 - 10.1 MG/DL    Bilirubin, total 1.3 (H) 0.2 - 1.0 MG/DL    ALT (SGPT) 33 16 - 61 U/L    AST (SGOT) 20 10 - 38 U/L    Alk.  phosphatase 88 45 - 117 U/L    Protein, total 5.2 (L) 6.4 - 8.2 g/dL    Albumin 2.1 (L) 3.4 - 5.0 g/dL    Globulin 3.1 2.0 - 4.0 g/dL    A-G Ratio 0.7 (L) 0.8 - 1.7     CBC WITH AUTOMATED DIFF    Collection Time: 11/14/22  1:00 AM   Result Value Ref Range    WBC 2.7 (L) 4.6 - 13.2 K/uL    RBC 2.90 (L) 4.35 - 5.65 M/uL    HGB 10.0 (L) 13.0 - 16.0 g/dL    HCT 30.0 (L) 36.0 - 48.0 %    .4 (H) 78.0 - 100.0 FL    MCH 34.5 (H) 24.0 - 34.0 PG    MCHC 33.3 31.0 - 37.0 g/dL    RDW 14.7 (H) 11.6 - 14.5 %    PLATELET 54 (L) 876 - 420 K/uL    MPV 11.5 9.2 - 11.8 FL    NRBC 0.0 0  WBC    ABSOLUTE NRBC 0.00 0.00 - 0.01 K/uL    NEUTROPHILS 73 40 - 73 %    LYMPHOCYTES 15 (L) 21 - 52 %    MONOCYTES 9 3 - 10 %    EOSINOPHILS 2 0 - 5 %    BASOPHILS 0 0 - 2 %    IMMATURE GRANULOCYTES 1 (H) 0.0 - 0.5 %    ABS. NEUTROPHILS 2.0 1.8 - 8.0 K/UL    ABS. LYMPHOCYTES 0.4 (L) 0.9 - 3.6 K/UL    ABS. MONOCYTES 0.3 0.05 - 1.2 K/UL    ABS. EOSINOPHILS 0.0 0.0 - 0.4 K/UL    ABS. BASOPHILS 0.0 0.0 - 0.1 K/UL    ABS. IMM. GRANS. 0.0 0.00 - 0.04 K/UL    DF AUTOMATED     MAGNESIUM    Collection Time: 11/14/22  1:00 AM   Result Value Ref Range    Magnesium 2.2 1.6 - 2.6 mg/dL   GLUCOSE, POC    Collection Time: 11/14/22  7:14 AM   Result Value Ref Range    Glucose (POC) 120 (H) 70 - 110 mg/dL         Radiology:   XR CHEST PORT    Result Date: 11/12/2022  EXAM: One view chest x-ray CLINICAL INDICATION/HISTORY: Dyspnea and lower extremity swelling. COMPARISON: 09/06/2022. TECHNIQUE: Single AP view of the chest was obtained. _______________ FINDINGS: HEART, VESSELS, MEDIASTINUM: Heart size is stable. No vascular congestion. There is atherosclerosis of the thoracic aorta. LUNGS, PLEURAL SPACES: The lungs are clear. No effusion or pneumothorax. BONY THORAX, SOFT TISSUES: Unremarkable. _______________     No acute cardiopulmonary process.           Oj Hoffman MD  [facility]

## 2022-11-14 NOTE — PROGRESS NOTES
Physician Progress Note      PATIENTLianne Kearns  CSN #:                  [id number]  :                       1940  ADMIT DATE:       2022 7:48 PM  [address] [place] DATE:  RESPONDING  PROVIDER #:        Macho Castillo MD          QUERY TEXT:    Pt admitted with complaints of increasing lower extremity edema SOB, [place]ON. Pt noted to have. If possible, please document in the progress notes and discharge summary if you are evaluating and /or treating any of the following: The medical record reflects the following:    Risk Factors: Chrohn's disease, CKD, HTN, UTI    Clinical Indicators:  > 22 20:15 WBC: 2.5 (L)  22 06:55 WBC: 2.2 (L)  22 01:00 WBC: 2.7 (L)  > BP 22 108/57    Treatment: receiving Rocephin, CMP, CBC, Cultures    Aura Huizar, RN, BSN, Mae dewey, CDI / Britt Javier Toney, [address] [email or web address]  Options provided:  -- Sepsis, present on admission  -- Sepsis, present on admission now resolved  -- UTI without Sepsis  -- Other - I will add my own diagnosis  -- Disagree - Not applicable / Not valid  -- Disagree - Clinically unable to determine / Unknown  -- Refer to Clinical Documentation Reviewer    PROVIDER RESPONSE TEXT:    This patient has UTI without Sepsis.     Query created by: Samara Pinzon on 2022 1:51 PM      Electronically signed by:  Macho Castillo MD 2022 6:17 PM

## 2022-11-14 NOTE — PROGRESS NOTES
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 Blood pressure 102/60, pulse 82, temperature 97.9 °F (36.6 °C), resp. rate 18, height 6' 1\" (1.854 m), weight 127 kg (280 lb), SpO2 100 %.       awake and alert   NAD  Edema plus 3      Intake/Output Summary (Last 24 hours) at 11/14/2022 1835  Last data filed at 11/14/2022 0931  Gross per 24 hour   Intake 120 ml   Output 1500 ml   Net -1380 ml      Recent Labs     11/14/22  0100   WBC 2.7*     Lab Results   Component Value Date/Time    Sodium 137 11/14/2022 01:00 AM    Potassium 3.2 (L) 11/14/2022 01:00 AM    Chloride 107 11/14/2022 01:00 AM    CO2 22 11/14/2022 01:00 AM    Anion gap 8 11/14/2022 01:00 AM    Glucose 134 (H) 11/14/2022 01:00 AM    BUN 92 (H) 11/14/2022 01:00 AM    Creatinine 6.09 (H) 11/14/2022 01:00 AM    BUN/Creatinine ratio 15 11/14/2022 01:00 AM    GFR est AA 36 (L) 09/15/2022 04:18 PM    GFR est non-AA 29 (L) 09/15/2022 04:18 PM    Calcium 7.8 (L) 11/14/2022 01:00 AM        Current Facility-Administered Medications   Medication Dose Route Frequency Provider Last Rate Last Admin    doxazosin (CARDURA) tablet 4 mg  4 mg Oral QHS Robyn Luo MD   4 mg at 11/13/22 2119    magnesium oxide (MAG-OX) tablet 400 mg  400 mg Oral QHS Robyn Luo MD   400 mg at 11/13/22 2119    ferrous sulfate tablet 325 mg  1 Tablet Oral BID WITH MEALS Robyn uLo MD   325 mg at 11/14/22 1757    cetirizine (ZYRTEC) tablet 10 mg  10 mg Oral QHS Robyn Luo MD   10 mg at 11/13/22 2119    furosemide (LASIX) injection 80 mg  80 mg IntraVENous TID Luann Luo MD   80 mg at 11/14/22 1751    metOLazone (ZAROXOLYN) tablet 10 mg  10 mg Oral DAILY Luann Luo MD   10 mg at 11/14/22 7672    insulin lispro (HUMALOG) injection   SubCUTAneous AC&HS Luann Luo MD   4 Units at 11/14/22 1750    glucose chewable tablet 16 g  4 Tablet Oral PRN Luann Luo MD        glucagon (GLUCAGEN) injection 1 mg  1 mg IntraMUSCular PRN Luann Luo MD        dextrose 10% infusion 0-250 mL  0-250 mL IntraVENous PRN Luann Luo MD        eltrombopag (PROMACTA) tablet 50 mg (Patient Supplied)  50 mg Oral ACB Luann Luo MD   50 mg at 11/14/22 1257    cefTRIAXone (ROCEPHIN) 1 g in 0.9% sodium chloride (MBP/ADV) 50 mL MBP  1 g IntraVENous Q24H Luann Luo  mL/hr at 11/14/22 0613 1 g at 11/14/22 4359    Lactobacillus Acidoph & Bulgar [facility]) tablet 2 Tablet  2 Tablet Oral BID Luann Luo MD   2 Tablet at 11/13/22 2100    spironolactone (ALDACTONE) tablet 50 mg  50 mg Oral DAILY Adán Pillai DO   50 mg at 11/14/22 3602

## 2022-11-14 NOTE — CONSULTS
Palliative Medicine Consult    Patient Name: Anson Rice YOB: 1940    Date of Initial Consult: 11/14/2022  Reason for Consult: Goals of care discussions  Requesting Provider: Dr. Jignesh Worley  Primary Care Physician: Dominik Dunn NP      SUMMARY:   Anson Rice is a 78 y. o. with a past history of Crohn's disease, chronic kidney disease, aortic stenosis requiring valvular surgery, and hypertension, who was admitted on 11/12/2022 from home with a diagnosis of acute renal failure on chronic kidney disease, severe aortic stenosis, thrombocytopenia, and diarrhea. Current medical issues leading to Palliative Medicine involvement include: Goals of care discussions in the setting of advanced age with chronic comorbidities. PALLIATIVE DIAGNOSES:   Goals of care discussions  Advance care planning  Acute renal failure on chronic kidney disease  Severe aortic stenosis  Thrombocytopenia  Diarrhea  Advanced age/debility       PLAN:   Goals of care discussions: Palliative medicine team including Remington Matthews RN and I met with patient at patient's bedside. Patient's wife Mrs. Claudia Waldrop also present. Patient is awake, alert, oriented x4. He denies pain, does have shortness of breath with minimal exertion, leg swelling which makes it difficult to ambulate. Introduced the role as palliative medicine. Patient has an AMD on file naming his wife Viviana Ronquillo as medical power of . Medical update provided. Discussed the benefits and burdens of CPR in the event of cardiopulmonary arrest.  Patient is most concerned about being \"a burden\". Mrs. Claudia Waldrop thinks that they may have completed DNR documents in the past, she is going to look. Patient and wife would like some time to consider their options, and agreed to further discuss code status options. At this time patient and family understand that he remains a full code with full interventions until any other firm decisions are made.   We will follow-up for further discussion. Advance care planning: Patient has AMD on file naming his wife Mrs. Monalisa Magallanes is medical power of . Patient states he does not have any children. Patient states that this AMD is correct. Wife confirms. Acute renal failure on chronic kidney disease: Gillis placed for strict I&O's, no obstruction per ultrasound, nephrology following  Severe aortic stenosis: Cardiology consulted, last EF was recorded at 60 to 65%, awaiting surgical repair. Diuretics for edema. Thrombocytopenia: Hematology following, did not respond to steroid, on Promacta since 11/7/2022, with recommendations to continue. Patient has his own supply  Diarrhea: In the setting of recent antibiotic use for UTI, C. difficile test pending. Advanced age/debility: 41-year-old male who lives with wife prior to admission. He ambulates with a walker, lives in a single-family single floor home. He was mostly independent in ADLs prior to admission.    Initial consult note routed to primary continuity provider  Communicated plan of care with: Palliative IDT       GOALS OF CARE / TREATMENT PREFERENCES:   [====Goals of Care====]  GOALS OF CARE: Full code with full interventions  Patient/Health Care Proxy Stated Goals: Prolong life      TREATMENT PREFERENCES:   Code Status: Full Code    Advance Care Planning:  Advance Care Planning 11/14/2022   Patient's Healthcare Decision Maker is: Named in scanned ACP document   Primary Decision Maker Name -   Primary Decision Maker Relationship to Patient -   Confirm Advance Directive Yes, on file   Patient Would Like to Complete Advance Directive -       Medical Interventions: Full interventions           The palliative care team has discussed with patient / health care proxy about goals of care / treatment preferences for patient.  [====Goals of Care====]         HISTORY:     History obtained from: Patient, family, chart    CHIEF COMPLAINT: Dyspnea with exertion, bilateral lower extremity swelling    HPI/SUBJECTIVE:    The patient is:   [x] Verbal and participatory  [] Non-participatory due to:   Oriented x4, engaged in goals of care discussions    Clinical Pain Assessment (nonverbal scale for severity on nonverbal patients):   Clinical Pain Assessment  Severity: 0            FUNCTIONAL ASSESSMENT:     Palliative Performance Scale (PPS):  PPS: 50       PSYCHOSOCIAL/SPIRITUAL SCREENING:     Advance Care Planning:  Advance Care Planning 11/14/2022   Patient's Healthcare Decision Maker is: Named in scanned ACP document   Primary Decision Maker Name -   Primary Decision Maker Relationship to Patient -   Confirm Advance Directive Yes, on file   Patient Would Like to Complete Advance Directive -        Any spiritual / Pentecostalism concerns:  [] Yes /  [x] No    Caregiver Burnout:  [] Yes /  [x] No /  [] No Caregiver Present      Anticipatory grief assessment:   [x] Normal  / [] Maladaptive          REVIEW OF SYSTEMS:     Positive and pertinent negative findings in ROS are noted above in HPI. The following systems were [x] reviewed / [] unable to be reviewed as noted in HPI  Other findings are noted below. Systems: constitutional, ears/nose/mouth/throat, respiratory, gastrointestinal, genitourinary, musculoskeletal, integumentary, neurologic, psychiatric, endocrine. Positive findings noted below. Modified ESAS Completed by: provider   Fatigue: 5       Pain: 0   Anxiety: 0 Nausea: 0   Anorexia: 0 Dyspnea: 2     Constipation: No              PHYSICAL EXAM:     From RN flowsheet:  Wt Readings from Last 3 Encounters:   11/13/22 127 kg (280 lb)   10/07/22 122.9 kg (271 lb)   09/06/22 113.4 kg (250 lb)     Blood pressure 102/60, pulse 69, temperature 97.9 °F (36.6 °C), resp. rate 18, height 6' 1\" (1.854 m), weight 127 kg (280 lb), SpO2 100 %.     Pain Scale 1: Numeric (0 - 10)  Pain Intensity 1: 0                 Constitutional: Awake, alert, lying in bed, no acute distress, appears elderly  Eyes: pupils equal, anicteric  ENMT: no nasal discharge, moist mucous membranes  Cardiovascular: regular rhythm, distal pulses intact, bilateral lower extremity and pedal edema  Respiratory: breathing mildly labored with minimal exertion, symmetric  Gastrointestinal: soft non-tender  Musculoskeletal: no deformity, no tenderness to palpation  Skin: warm, dry  Neurologic: following commands, moving all extremities, oriented x4  Psychiatric: full affect, no hallucinations         HISTORY:     Principal Problem:    NAPOLEON (acute kidney injury) ([facility] [address] (2022)    Active Problems: Aortic stenosis (10/7/2022)      Peripheral edema (2022)      Hypoxia (2022)    Past Medical History:   Diagnosis Date    Bladder calculus     Chronic kidney disease     H/O kidney stones    Crohn disease ([facility] [address]     Foot drop     left    Hypertension     Kidney calculi       Past Surgical History:   Procedure Laterality Date    HX BACK SURGERY      3 back surgery nola and screw    HX CATARACT REMOVAL Bilateral     HX GI      bowel [id number]    HX GI      hemorrhoidectomy    HX UROLOGICAL      kidney stone removal      History reviewed. No pertinent family history. History reviewed, no pertinent family history.   Social History     Tobacco Use    Smoking status: Former     Types: Cigarettes     Quit date: 2000     Years since quittin.2    Smokeless tobacco: Never   Substance Use Topics    Alcohol use: No     No Known Allergies   Current Facility-Administered Medications   Medication Dose Route Frequency    doxazosin (CARDURA) tablet 4 mg  4 mg Oral QHS    magnesium oxide (MAG-OX) tablet 400 mg  400 mg Oral QHS    metoprolol succinate (TOPROL-XL) XL tablet 25 mg  25 mg Oral QPM    ferrous sulfate tablet 325 mg  1 Tablet Oral BID WITH MEALS    cetirizine (ZYRTEC) tablet 10 mg  10 mg Oral QHS    furosemide (LASIX) injection 80 mg  80 mg IntraVENous TID    metOLazone (ZAROXOLYN) tablet 10 mg  10 mg Oral DAILY    insulin lispro (HUMALOG) injection   SubCUTAneous AC&HS    glucose chewable tablet 16 g  4 Tablet Oral PRN    glucagon (GLUCAGEN) injection 1 mg  1 mg IntraMUSCular PRN    dextrose 10% infusion 0-250 mL  0-250 mL IntraVENous PRN    eltrombopag (PROMACTA) tablet 50 mg (Patient Supplied)  50 mg Oral ACB    cefTRIAXone (ROCEPHIN) 1 g in 0.9% sodium chloride (MBP/ADV) 50 mL MBP  1 g IntraVENous Q24H    Lactobacillus Acidoph & Bulgar (FLORANEX) tablet 2 Tablet  2 Tablet Oral BID    spironolactone (ALDACTONE) tablet 50 mg  50 mg Oral DAILY          LAB AND IMAGING FINDINGS:     Lab Results   Component Value Date/Time    WBC 2.7 (L) 11/14/2022 01:00 AM    HGB 10.0 (L) 11/14/2022 01:00 AM    PLATELET 54 (L) 72/93/1912 01:00 AM     Lab Results   Component Value Date/Time    Sodium 137 11/14/2022 01:00 AM    Potassium 3.2 (L) 11/14/2022 01:00 AM    Chloride 107 11/14/2022 01:00 AM    CO2 22 11/14/2022 01:00 AM    BUN 92 (H) 11/14/2022 01:00 AM    Creatinine 6.09 (H) 11/14/2022 01:00 AM    Calcium 7.8 (L) 11/14/2022 01:00 AM    Magnesium 2.2 11/14/2022 01:00 AM      Lab Results   Component Value Date/Time    Alk. phosphatase 88 11/14/2022 01:00 AM    Protein, total 5.2 (L) 11/14/2022 01:00 AM    Albumin 2.1 (L) 11/14/2022 01:00 AM    Globulin 3.1 11/14/2022 01:00 AM     Lab Results   Component Value Date/Time    INR 1.5 (H) 09/06/2022 06:00 PM    Prothrombin time 18.4 (H) 09/06/2022 06:00 PM    aPTT 40.5 (H) 09/06/2022 06:00 PM      No results found for: IRON, FE, TIBC, IBCT, PSAT, FERR   No results found for: PH, PCO2, PO2  No components found for: GLPOC   No results found for: CPK, CKMB             Total time: 50 minutes  Counseling / coordination time, spent as noted above:   > 50% counseling / coordination?:  Yes, patient, family, medical team    Prolonged service was provided for  []30 min   []75 min in face to face time in the presence of the patient, spent as noted above.   Time Start:   Time End:

## 2022-11-14 NOTE — ACP (ADVANCE CARE PLANNING)
Advance Care Planning     General Advance Care Planning (ACP) Conversation  Date of Conversation: 11/14/2022  Conducted with: Healthcare Decision Maker: Named in Advance Directive or Healthcare Power of [employer] Decision Maker:     Primary Decision Maker: Abilio Pelaez - Spouse - [phone]      Today we documented Decision Maker(s) consistent with ACP documents on file. Content/Action Overview: Has ACP document(s) on file - reflects the patient's care preferences  Reviewed DNR/DNI and patient elects Full Code (Attempt Resuscitation)    Length of Voluntary ACP Conversation in minutes:  <16 minutes (Non-Billable)     11/14/2022 1015 Seen today in room 345 along with Karine Vera NP. Lying in bed with head of bed elevated Awake, alert. Oriented x 4 Respirations unlabored on room air. Able to speak in full  sentences. Came to the ED 11/12/2022 from home per POV for bilateral leg swelling and shortness of breath. Found to have elevated creatinine (5 from baseline of 2.5)    PMH significant for aortic stenosis awaiting surgical repair, thrombocytopenia, CKD, Crohn's disease, HTN, multiple back surgeries, renolithiasis    Admitted to telemetry for ARF on CKD (alcocer, renal US, diuresis), aortic stenosis (cardiology consultation, trend troponins), thrombocytopenia (avoid heparin, SCDs, hematology consultation), HTN (trend BPs, home medications), Crohn's disease (r/o Cdiff, questran, hyperglycemia (sliding scale with goal of euglycemia)    Introduced role of palliative care to the hospitalized patient. His wife, Nika Monsalve was in the room. Lives in a single family, single floor home with his wife. . Prior to admission, he was mostly independent in ADLs. Uses a walker for ambulation assistance. Reviewed the AMD on file naming his wife as his MPOA. They both confirmed the document was accurate and there were no changes to be made.      Described the four components of cardiac resuscitation: compressions, medications, electric shocks, and intubation/ventilation. Reviewed benefits and burdens to include fractured ribs, punctured lungs, hypoxic brain injury, and long-term ventilation with need for discussion of tracheostomy or compassionate extubation. He is most concerned about not being a \"burden\" to his wife. Mrs Vida Marks said she thinks they may have DNR documents at home--she is going to look. They both asked many thoughtful questions. They were left considering their options and agreeing to further discuss. Will follow up for an opportunity to ask more questions and get any necessary clarification. Disposition plan: anticipate home with family support    Palliative care will continue to follow Beth Chong  and his family during his hospitalization and support them as they make healthcare decisions and define goals of care.       Jaylen Talbot RN, MSN  Palliative Medicine  P: [phone]

## 2022-11-14 NOTE — PROGRESS NOTES
CM notes patient has palliative care consult and therapy ordered. CM to watch for palliative and therapy ntes to assist in identifying any discharge planning needs. Please encourage ambulation as appropriate and use of IS. Care Management Interventions  PCP Verified by CM:  Yes  Mode of Transport at Discharge: Self  Transition of Care Consult (CM Consult): Discharge Planning  Support Systems: Spouse/Significant Other  Confirm Follow Up Transport: Family  The Plan for Transition of Care is Related to the Following Treatment Goals : acute renal failure  The Patient and/or Patient Representative was Provided with a Choice of Provider and Agrees with the Discharge Plan?: Yes  Name of the Patient Representative Who was Provided with a Choice of Provider and Agrees with the Discharge Plan: Frantz Fermin, patient  Discharge Location  Patient Expects to be Discharged to[de-identified] Home with family assistance

## 2022-11-14 NOTE — PROGRESS NOTES
Hospitalist Progress Note    Patient: Florentin Diaz. MRN: [id number]  CSN: [id number]    YOB: 1940  Age: 78 y.o.   Sex: male    DOA: 11/12/2022 LOS:  LOS: 2 days          Chief Complaint:    ARF      Assessment/Plan     Acute renal failure on chronic kidney disease  Gillis placed  No obst per US  Strict I's and O's  IV Lasix and Zaroxolyn  Nephrology on case     Severe aortic stenosis  Trend troponins  Cardiology is consulted  Holding ACE and ARB right now we will defer on echo  Last EF was 60 to 65% with grade 1 diastolic dysfunction     Thrombocytopenia, chronic  Avoid heparin  SCDs for DVT prevention  Started on his Promacta 50mg daily     Hypertension  Is restarted on his Cardura and metoprolol     History of Crohn's disease   Continue Questran daily for diarrhea     Hyperglycemia likely aggravated by prednisone he is placed on a sliding scale insulin he may have diabetes although his last A1c was 6.2     Diarrhea  Check stool cultures  Start pro biotics continue Questran     Recent UTI causing encephalopathy   STARTED ON Rocephin until cultures are back     Hypokalemia-PO Kdur    Palliative care consult  PT consult         Disposition :  Patient Active Problem List   Diagnosis Code    Altered mental status Z23.60    Acute metabolic encephalopathy L85.09    Thrombocytopenia (HCC) D69.6    Aortic stenosis I35.0    CKD (chronic kidney disease) N18.9    Acute kidney injury superimposed on chronic kidney disease (HCC) N17.9, N18.9    Peripheral edema R60.9    Hypoxia R09.02    NAPOLEON (acute kidney injury) ([facility] [address] N17.9       Subjective:  He feels better  He denies any SOB, nausea, pain, back pain  He did eat this am      Review of systems:    Constitutional: denies fevers, chills  Respiratory: denies SOB, cough  Cardiovascular: denies chest pain  Gastrointestinal: denies nausea, vomiting, diarrhea      Vital signs/Intake and Output:  Visit Vitals  /61   Pulse 71   Temp 97.4 °F (36.3 °C)   Resp 18 Ht 6' 1\" (1.854 m)   Wt 127 kg (280 lb)   SpO2 99%   BMI 36.94 kg/m²     Current Shift:  11/14 0701 - 11/14 1900  In: -   Out: 950 [Urine:950]  Last three shifts:  11/12 1901 - 11/14 0700  In: 120 [P.O.:120]  Out: 1580 [Urine:1580]    Exam:    General: elderly WM, alert, NAD, OX3  CVS:Regular rate and rhythm, no M/R/G, S1/S2 heard, no thrill  Lungs:Clear to auscultation bilaterally, no wheezes, rhonchi, or rales  Abdomen: Soft, Nontender, No distention, Normal Bowel sounds  Extremities:stasis edema LE 3-4 plus with stasis dermatitis redness, possible mild cellulitis  Neuro:grossly normal,follows commands  Psych:appropriate                Labs: Results:       Chemistry Recent Labs     11/14/22 0100 11/13/22 0655 11/12/22 2015   * 140* 159*    139 139   K 3.2* 3.3* 3.6    106 107   CO2 22 22 20*   BUN 92* 90* 87*   CREA 6.09* 5.88* 5.62*   CA 7.8* 8.0* 7.9*   AGAP 8 11 12   BUCR 15 15 15   AP 88 85 96   TP 5.2* 5.1* 5.6*   ALB 2.1* 2.2* 2.4*   GLOB 3.1 2.9 3.2   AGRAT 0.7* 0.8 0.8      CBC w/Diff Recent Labs     11/14/22 0100 11/13/22  0655 11/12/22 2015   WBC 2.7* 2.2* 2.5*   RBC 2.90* 2.89* 3.12*   HGB 10.0* 10.1* 11.0*   HCT 30.0* 29.5* 31.8*   PLT 54* 49* 65*   GRANS 73 73 74*   LYMPH 15* 15* 14*   EOS 2 1 0      Cardiac Enzymes No results for input(s): CPK, CKND1, CESAR in the last 72 hours. No lab exists for component: CKRMB, TROIP   Coagulation No results for input(s): PTP, INR, APTT, INREXT in the last 72 hours. Lipid Panel Lab Results   Component Value Date/Time    Cholesterol, total 102 10/07/2022 03:45 AM    HDL Cholesterol 42 10/07/2022 03:45 AM    LDL, calculated 47.6 10/07/2022 03:45 AM    VLDL, calculated 12.4 10/07/2022 03:45 AM    Triglyceride 62 10/07/2022 03:45 AM    CHOL/HDL Ratio 2.4 10/07/2022 03:45 AM      BNP No results for input(s): BNPP in the last 72 hours.    Liver Enzymes Recent Labs     11/14/22  0100   TP 5.2*   ALB 2.1*   AP 88      Thyroid Studies No results found for: T4, T3U, TSH, TSHEXT     Procedures/imaging: see electronic medical records for all procedures/Xrays and details which were not copied into this note but were reviewed prior to creation of Lay Harrell MD

## 2022-11-14 NOTE — PROGRESS NOTES
Bedside, Verbal, and Written shift change report given to Jacquie Nathan RN (oncoming nurse) by Levi Davalos RN (offgoing nurse). Report included the following information SBAR, Kardex, and MAR.

## 2022-11-15 ENCOUNTER — APPOINTMENT (OUTPATIENT)
Dept: NON INVASIVE DIAGNOSTICS | Age: 82
DRG: 682 | End: 2022-11-15
Attending: INTERNAL MEDICINE
Payer: MEDICARE

## 2022-11-15 LAB
ALBUMIN SERPL-MCNC: 2.1 G/DL (ref 3.4–5)
ALBUMIN/GLOB SERPL: 0.7 {RATIO} (ref 0.8–1.7)
ALP SERPL-CCNC: 82 U/L (ref 45–117)
ALT SERPL-CCNC: 28 U/L (ref 16–61)
ANION GAP SERPL CALC-SCNC: 11 MMOL/L (ref 3–18)
AST SERPL-CCNC: 18 U/L (ref 10–38)
BASOPHILS # BLD: 0 K/UL (ref 0–0.1)
BASOPHILS NFR BLD: 0 % (ref 0–2)
BILIRUB SERPL-MCNC: 1.3 MG/DL (ref 0.2–1)
BUN SERPL-MCNC: 101 MG/DL (ref 7–18)
BUN/CREAT SERPL: 16 (ref 12–20)
CALCIUM SERPL-MCNC: 8.2 MG/DL (ref 8.5–10.1)
CHLORIDE SERPL-SCNC: 105 MMOL/L (ref 100–111)
CO2 SERPL-SCNC: 22 MMOL/L (ref 21–32)
CREAT SERPL-MCNC: 6.46 MG/DL (ref 0.6–1.3)
DIFFERENTIAL METHOD BLD: ABNORMAL
ECHO AO ROOT DIAM: 3.5 CM
ECHO AO ROOT INDEX: 1.41 CM/M2
ECHO AR MAX VEL PISA: 1.9 M/S
ECHO AV AREA PEAK VELOCITY: 1.3 CM2
ECHO AV AREA VTI: 1.2 CM2
ECHO AV AREA/BSA PEAK VELOCITY: 0.5 CM2/M2
ECHO AV AREA/BSA VTI: 0.5 CM2/M2
ECHO AV MEAN GRADIENT: 18 MMHG
ECHO AV MEAN VELOCITY: 2 M/S
ECHO AV PEAK GRADIENT: 27 MMHG
ECHO AV PEAK VELOCITY: 2.6 M/S
ECHO AV REGURGITANT PHT: 659.6 MILLISECOND
ECHO AV VELOCITY RATIO: 0.35
ECHO AV VTI: 52.7 CM
ECHO LV E' LATERAL VELOCITY: 11 CM/S
ECHO LV E' SEPTAL VELOCITY: 7 CM/S
ECHO LV EDV A2C: 50 ML
ECHO LV EDV A4C: 143 ML
ECHO LV EDV BP: 89 ML (ref 67–155)
ECHO LV EDV INDEX A4C: 58 ML/M2
ECHO LV EDV INDEX BP: 36 ML/M2
ECHO LV EDV NDEX A2C: 20 ML/M2
ECHO LV EJECTION FRACTION A2C: 68 %
ECHO LV EJECTION FRACTION A4C: 74 %
ECHO LV EJECTION FRACTION BIPLANE: 71 % (ref 55–100)
ECHO LV ESV A2C: 16 ML
ECHO LV ESV A4C: 37 ML
ECHO LV ESV BP: 26 ML (ref 22–58)
ECHO LV ESV INDEX A2C: 6 ML/M2
ECHO LV ESV INDEX A4C: 15 ML/M2
ECHO LV ESV INDEX BP: 10 ML/M2
ECHO LV FRACTIONAL SHORTENING: 42 % (ref 28–44)
ECHO LV INTERNAL DIMENSION DIASTOLE INDEX: 2.02 CM/M2
ECHO LV INTERNAL DIMENSION DIASTOLIC: 5 CM (ref 4.2–5.9)
ECHO LV INTERNAL DIMENSION SYSTOLIC INDEX: 1.17 CM/M2
ECHO LV INTERNAL DIMENSION SYSTOLIC: 2.9 CM
ECHO LV IVSD: 1.4 CM (ref 0.6–1)
ECHO LV MASS 2D: 220.3 G (ref 88–224)
ECHO LV MASS INDEX 2D: 88.8 G/M2 (ref 49–115)
ECHO LV POSTERIOR WALL DIASTOLIC: 0.9 CM (ref 0.6–1)
ECHO LV RELATIVE WALL THICKNESS RATIO: 0.36
ECHO LVOT AREA: 3.8 CM2
ECHO LVOT AV VTI INDEX: 0.32
ECHO LVOT DIAM: 2.2 CM
ECHO LVOT MEAN GRADIENT: 2 MMHG
ECHO LVOT PEAK GRADIENT: 3 MMHG
ECHO LVOT PEAK VELOCITY: 0.9 M/S
ECHO LVOT STROKE VOLUME INDEX: 25.6 ML/M2
ECHO LVOT SV: 63.4 ML
ECHO LVOT VTI: 16.7 CM
ECHO MV A VELOCITY: 1.69 M/S
ECHO MV AREA VTI: 1.4 CM2
ECHO MV E DECELERATION TIME (DT): 186.6 MS
ECHO MV E VELOCITY: 1 M/S
ECHO MV E/A RATIO: 0.59
ECHO MV E/E' LATERAL: 9.09
ECHO MV E/E' RATIO (AVERAGED): 11.69
ECHO MV E/E' SEPTAL: 14.29
ECHO MV LVOT VTI INDEX: 2.72
ECHO MV MAX VELOCITY: 1.8 M/S
ECHO MV MEAN GRADIENT: 6 MMHG
ECHO MV MEAN VELOCITY: 1.1 M/S
ECHO MV PEAK GRADIENT: 13 MMHG
ECHO MV VTI: 45.5 CM
ECHO TV REGURGITANT MAX VELOCITY: 1.79 M/S
ECHO TV REGURGITANT PEAK GRADIENT: 13 MMHG
EOSINOPHIL # BLD: 0.1 K/UL (ref 0–0.4)
EOSINOPHIL NFR BLD: 2 % (ref 0–5)
ERYTHROCYTE [DISTWIDTH] IN BLOOD BY AUTOMATED COUNT: 14.7 % (ref 11.6–14.5)
GLOBULIN SER CALC-MCNC: 3.2 G/DL (ref 2–4)
GLUCOSE BLD STRIP.AUTO-MCNC: 126 MG/DL (ref 70–110)
GLUCOSE BLD STRIP.AUTO-MCNC: 133 MG/DL (ref 70–110)
GLUCOSE BLD STRIP.AUTO-MCNC: 156 MG/DL (ref 70–110)
GLUCOSE BLD STRIP.AUTO-MCNC: 243 MG/DL (ref 70–110)
GLUCOSE SERPL-MCNC: 130 MG/DL (ref 74–99)
HCT VFR BLD AUTO: 27.7 % (ref 36–48)
HGB BLD-MCNC: 9.6 G/DL (ref 13–16)
IMM GRANULOCYTES # BLD AUTO: 0 K/UL (ref 0–0.04)
IMM GRANULOCYTES NFR BLD AUTO: 0 % (ref 0–0.5)
LYMPHOCYTES # BLD: 0.5 K/UL (ref 0.9–3.6)
LYMPHOCYTES NFR BLD: 20 % (ref 21–52)
MAGNESIUM SERPL-MCNC: 2.2 MG/DL (ref 1.6–2.6)
MCH RBC QN AUTO: 35.6 PG (ref 24–34)
MCHC RBC AUTO-ENTMCNC: 34.7 G/DL (ref 31–37)
MCV RBC AUTO: 102.6 FL (ref 78–100)
MONOCYTES # BLD: 0.3 K/UL (ref 0.05–1.2)
MONOCYTES NFR BLD: 12 % (ref 3–10)
NEUTS SEG # BLD: 1.8 K/UL (ref 1.8–8)
NEUTS SEG NFR BLD: 65 % (ref 40–73)
NRBC # BLD: 0 K/UL (ref 0–0.01)
NRBC BLD-RTO: 0 PER 100 WBC
PLATELET # BLD AUTO: 57 K/UL (ref 135–420)
PMV BLD AUTO: 10.9 FL (ref 9.2–11.8)
POTASSIUM SERPL-SCNC: 3.5 MMOL/L (ref 3.5–5.5)
PROT SERPL-MCNC: 5.3 G/DL (ref 6.4–8.2)
RBC # BLD AUTO: 2.7 M/UL (ref 4.35–5.65)
SODIUM SERPL-SCNC: 138 MMOL/L (ref 136–145)
WBC # BLD AUTO: 2.7 K/UL (ref 4.6–13.2)

## 2022-11-15 PROCEDURE — 97116 GAIT TRAINING THERAPY: CPT

## 2022-11-15 PROCEDURE — 80053 COMPREHEN METABOLIC PANEL: CPT

## 2022-11-15 PROCEDURE — 74011000258 HC RX REV CODE- 258: Performed by: INTERNAL MEDICINE

## 2022-11-15 PROCEDURE — 74011250637 HC RX REV CODE- 250/637: Performed by: INTERNAL MEDICINE

## 2022-11-15 PROCEDURE — 36415 COLL VENOUS BLD VENIPUNCTURE: CPT

## 2022-11-15 PROCEDURE — 83735 ASSAY OF MAGNESIUM: CPT

## 2022-11-15 PROCEDURE — 93325 DOPPLER ECHO COLOR FLOW MAPG: CPT

## 2022-11-15 PROCEDURE — 74011250636 HC RX REV CODE- 250/636: Performed by: INTERNAL MEDICINE

## 2022-11-15 PROCEDURE — 65270000046 HC RM TELEMETRY

## 2022-11-15 PROCEDURE — 82962 GLUCOSE BLOOD TEST: CPT

## 2022-11-15 PROCEDURE — 97530 THERAPEUTIC ACTIVITIES: CPT

## 2022-11-15 PROCEDURE — 74011636637 HC RX REV CODE- 636/637: Performed by: INTERNAL MEDICINE

## 2022-11-15 PROCEDURE — 97166 OT EVAL MOD COMPLEX 45 MIN: CPT

## 2022-11-15 PROCEDURE — 85025 COMPLETE CBC W/AUTO DIFF WBC: CPT

## 2022-11-15 PROCEDURE — 99233 SBSQ HOSP IP/OBS HIGH 50: CPT | Performed by: NURSE PRACTITIONER

## 2022-11-15 RX ADMIN — SPIRONOLACTONE 100 MG: 100 TABLET ORAL at 08:46

## 2022-11-15 RX ADMIN — Medication 2 TABLET: at 08:47

## 2022-11-15 RX ADMIN — Medication 2 TABLET: at 21:09

## 2022-11-15 RX ADMIN — METOLAZONE 10 MG: 5 TABLET ORAL at 08:45

## 2022-11-15 RX ADMIN — Medication 400 MG: at 21:09

## 2022-11-15 RX ADMIN — FERROUS SULFATE TAB 325 MG (65 MG ELEMENTAL FE) 325 MG: 325 (65 FE) TAB at 08:46

## 2022-11-15 RX ADMIN — FUROSEMIDE 80 MG: 10 INJECTION, SOLUTION INTRAMUSCULAR; INTRAVENOUS at 21:09

## 2022-11-15 RX ADMIN — Medication 2 UNITS: at 21:10

## 2022-11-15 RX ADMIN — CETIRIZINE HYDROCHLORIDE 10 MG: 10 TABLET, FILM COATED ORAL at 21:09

## 2022-11-15 RX ADMIN — Medication 2 TABLET: at 02:12

## 2022-11-15 RX ADMIN — CEFTRIAXONE 1 G: 1 INJECTION, POWDER, FOR SOLUTION INTRAMUSCULAR; INTRAVENOUS at 06:02

## 2022-11-15 RX ADMIN — DOXAZOSIN MESYLATE 4 MG: 4 TABLET ORAL at 21:09

## 2022-11-15 RX ADMIN — FUROSEMIDE 80 MG: 10 INJECTION, SOLUTION INTRAMUSCULAR; INTRAVENOUS at 08:45

## 2022-11-15 RX ADMIN — FUROSEMIDE 80 MG: 10 INJECTION, SOLUTION INTRAMUSCULAR; INTRAVENOUS at 16:14

## 2022-11-15 RX ADMIN — Medication 4 UNITS: at 11:30

## 2022-11-15 RX ADMIN — FERROUS SULFATE TAB 325 MG (65 MG ELEMENTAL FE) 325 MG: 325 (65 FE) TAB at 16:15

## 2022-11-15 NOTE — PROGRESS NOTES
Cardiology Progress Note        Patient: Ramez Jensen Sex: male          DOA: 11/12/2022  YOB: 1940      Age:  78 y.o.        LOS:  LOS: 2 days     Patient seen and examined, chart reviewed. Assessment/Plan     Patient Active Problem List   Diagnosis Code    Altered mental status E63.87    Acute metabolic encephalopathy I80.95    Thrombocytopenia (HCC) D69.6    Aortic stenosis I35.0    CKD (chronic kidney disease) N18.9    Acute kidney injury superimposed on chronic kidney disease ([address] N17.9, N18.9    Peripheral edema R60.9    Hypoxia R09.02    NAPOLEON (acute kidney injury) ([address] N17.9       Acute on chronic renal failure   Nonrheumatic aortic stenosis   Bilateral lower extremity leg edema   Abnormal troponin no clear evidence of acute coronary syndrome, could be demand ischemia     Echocardiogram was done in 10/2022 revealed       Left Ventricle: Normal left ventricular systolic function with a visually estimated EF of 60 - 65%. Left ventricle size is normal. Mildly increased wall thickness. Findings consistent with mild concentric hypertrophy. Normal wall motion. Grade I diastolic dysfunction present with normal LV EF. Left Atrium: Left atrium is mildly dilated. Aortic Valve: Tricuspid valve. Mildly calcified cusp. Mild regurgitation. Moderate stenosis of the aortic valve. Tricuspid Valve: Unable to assess RVSP due to inadequate or insignificant tricuspid regurgitation. Interatrial Septum: No interatrial shunt visualized with color Doppler. Suboptimal bubble study. Agitated saline study was negative with and without provocation. Plan:      Continue IV Lasix and metolazone. Discontinue Metoprolol  Limited echocardiogram   Strict input output. Monitor renal function and electrolytes. Plan discussed with patient and family member at bed side. Case discussed with    Will follow-up            Subjective:    cc:    My breathing is little better, leg swelling      REVIEW OF SYSTEMS:     General: No fevers or chills. Cardiovascular: No chest pain,No palpitations, No orthopnea, No PND,  positive leg swelling, No claudication  Pulmonary: No shortness of breath. Gastrointestinal: No nausea, vomiting, bleeding  Neurology: No Dizziness    Objective:      Visit Vitals  /60   Pulse 82   Temp 97.9 °F (36.6 °C)   Resp 18   Ht 6' 1\" (1.854 m)   Wt 127 kg (280 lb)   SpO2 100%   BMI 36.94 kg/m²     Body mass index is 36.94 kg/m². Physical Exam:  General Appearance: Comfortable, not using accessory muscles of respiration. HEENT: EVERT. HEAD: Atraumatic  NECK: No JVD, no thyroidomeglay. CAROTIDS: no bruit  LUNGS: Clear bilaterally. HEART: S1+S2 audible, 3/6 systolic murmur in aortic and tricuspid area, no pericardial rub. ABD: Non-tender, BS Audible    EXT: bilateral lower extremity +++ edema, bilateral lower extremity skin discoloration, and no cyanosis. MUSCULOSKELETAL: Grossly no joint deformity.   NEUROLOGICAL: AAO times 3, No motor and sensory deficit    Medication:  Current Facility-Administered Medications   Medication Dose Route Frequency    [START ON 11/15/2022] spironolactone (ALDACTONE) tablet 100 mg  100 mg Oral DAILY    doxazosin (CARDURA) tablet 4 mg  4 mg Oral QHS    magnesium oxide (MAG-OX) tablet 400 mg  400 mg Oral QHS    ferrous sulfate tablet 325 mg  1 Tablet Oral BID WITH MEALS    cetirizine (ZYRTEC) tablet 10 mg  10 mg Oral QHS    furosemide (LASIX) injection 80 mg  80 mg IntraVENous TID    metOLazone (ZAROXOLYN) tablet 10 mg  10 mg Oral DAILY    insulin lispro (HUMALOG) injection   SubCUTAneous AC&HS    glucose chewable tablet 16 g  4 Tablet Oral PRN    glucagon (GLUCAGEN) injection 1 mg  1 mg IntraMUSCular PRN    dextrose 10% infusion 0-250 mL  0-250 mL IntraVENous PRN    eltrombopag (PROMACTA) tablet 50 mg (Patient Supplied)  50 mg Oral ACB    cefTRIAXone (ROCEPHIN) 1 g in 0.9% sodium chloride (MBP/ADV) 50 mL MBP 1 g IntraVENous Q24H    Lactobacillus Acidoph & Bulgar [facility]) tablet 2 Tablet  2 Tablet Oral BID               Lab/Data Reviewed:       Recent Labs     11/14/22  0100 11/13/22  0655 11/12/22 2015   WBC 2.7* 2.2* 2.5*   HGB 10.0* 10.1* 11.0*   HCT 30.0* 29.5* 31.8*   PLT 54* 49* 65*     Recent Labs     11/14/22 0100 11/13/22  0655 11/12/22 2015    139 139   K 3.2* 3.3* 3.6    106 107   CO2 22 22 20*   * 140* 159*   BUN 92* 90* 87*   CREA 6.09* 5.88* 5.62*   CA 7.8* 8.0* 7.9*       Signed By: Carlota Auguste MD     November 14, 2022

## 2022-11-15 NOTE — PROGRESS NOTES
Patient:  Keo Harrington. :  1940  Gender:  male  MRN #:  [id number]    Assessment:     Keo Duran is a 78y.o. year old male with ongoing CKD baseline cr of 2.3 range. 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  Probably right sided heart failure   Thrombocytopenia   Anemia     Plan:   His breathing is stable so far , has decent urine output, but creatinine is worsening   As of now no indications of dialysis but eventually he might need it if renal functions continues to deteriorate  Continue aldactone, lasix and metolazone for volume management  Cardiology consult and ECHO   Less than 2 gram salt   Intake and output   Dose all meds for current EGFR   Anemia workup , add ferritin and iron level     Discussed with pt and his wife at bedside          Subjective/Interval Events          Blood pressure (!) 101/49, pulse 78, temperature 98 °F (36.7 °C), resp. rate 16, height 6' 1\" (1.854 m), weight 138.8 kg (306 lb), SpO2 100 %. Exam:    Pt awake and alert  Lung: clear to auscultation  Heart: s1s2 regualr no rubs or murmur  Abdomen: soft, non tender, no guarding, normal bowel sounds. Ext: # + edema and skin rash   CNS- Oriented to time , place and person     [unfilled]   Recent Labs     11/15/22  0417   WBC 2.7*   HCT 27.7*   .6*     Recent Labs     11/15/22  0417      CO2 22   *     Recent Labs     11/15/22  0417   AGRAT 0.7*     USG retroperitoneum: - Increased left and right renal cortical echogenicity, suggestive of chronic  medical renal disease. No obstructive uropathy or acute sonographic  abnormality. Medical Decision Making :    I have reviewed patient's record for pertinent labs, radiology and other test . I have reviewed old records.  I have ordered labs, medications and radiology as necessary and indicated per patient's condition . Total time spent is approximately 35 minutes. Greater than 50 % of that time was spent in counseling and or care coordination.      Luis Daniel Lewis MD  Nephrology -[facility], [facility].

## 2022-11-15 NOTE — PROGRESS NOTES
Problem: Risk for Spread of Infection  Goal: Prevent transmission of infectious organism to others  Description: Prevent the transmission of infectious organisms to other patients, staff members, and visitors. Outcome: Progressing Towards Goal     Problem: Patient Education:  Go to Education Activity  Goal: Patient/Family Education  Outcome: Progressing Towards Goal     Problem: Falls - Risk of  Goal: *Absence of Falls  Description: Document Ramona Counts Fall Risk and appropriate interventions in the flowsheet. Outcome: Progressing Towards Goal  Note: Fall Risk Interventions:  Mobility Interventions: Bed/chair exit alarm, PT Consult for mobility concerns         Medication Interventions: Teach patient to arise slowly, Patient to call before getting OOB, Bed/chair exit alarm    Elimination Interventions: Call light in reach, Patient to call for help with toileting needs              Problem: Patient Education: Go to Patient Education Activity  Goal: Patient/Family Education  Outcome: Progressing Towards Goal     Problem: Diabetes Self-Management  Goal: *Disease process and treatment process  Description: Define diabetes and identify own type of diabetes; list 3 options for treating diabetes. Outcome: Progressing Towards Goal  Goal: *Incorporating nutritional management into lifestyle  Description: Describe effect of type, amount and timing of food on blood glucose; list 3 methods for planning meals. Outcome: Progressing Towards Goal  Goal: *Incorporating physical activity into lifestyle  Description: State effect of exercise on blood glucose levels. Outcome: Progressing Towards Goal  Goal: *Developing strategies to promote health/change behavior  Description: Define the ABC's of diabetes; identify appropriate screenings, schedule and personal plan for screenings.   Outcome: Progressing Towards Goal  Goal: *Using medications safely  Description: State effect of diabetes medications on diabetes; name diabetes medication taking, action and side effects. Outcome: Progressing Towards Goal  Goal: *Monitoring blood glucose, interpreting and using results  Description: Identify recommended blood glucose targets  and personal targets. Outcome: Progressing Towards Goal  Goal: *Prevention, detection, treatment of acute complications  Description: List symptoms of hyper- and hypoglycemia; describe how to treat low blood sugar and actions for lowering  high blood glucose level. Outcome: Progressing Towards Goal  Goal: *Prevention, detection and treatment of chronic complications  Description: Define the natural course of diabetes and describe the relationship of blood glucose levels to long term complications of diabetes.   Outcome: Progressing Towards Goal  Goal: *Developing strategies to address psychosocial issues  Description: Describe feelings about living with diabetes; identify support needed and support network  Outcome: Progressing Towards Goal  Goal: *Insulin pump training  Outcome: Progressing Towards Goal  Goal: *Sick day guidelines  Outcome: Progressing Towards Goal  Goal: *Patient Specific Goal (EDIT GOAL, INSERT TEXT)  Outcome: Progressing Towards Goal     Problem: Patient Education: Go to Patient Education Activity  Goal: Patient/Family Education  Outcome: Progressing Towards Goal     Problem: Patient Education: Go to Patient Education Activity  Goal: Patient/Family Education  Outcome: Progressing Towards Goal

## 2022-11-15 NOTE — PROGRESS NOTES
conducted an initial consultation and Spiritual Assessment for Columba Gonsalez, who is a 78 y.o.,male. Patients Primary Language is: [place]. According to the patients EMR Buddhism Affiliation is: [place]. The reason the Patient came to the hospital is:   Patient Active Problem List    Diagnosis Date Noted    Peripheral edema 11/12/2022    Hypoxia 11/12/2022    NAPOLEON (acute kidney injury) ([facility] [address] 54/39/2357    Acute metabolic encephalopathy 40/92/4422    Thrombocytopenia ([facility] [address] 10/07/2022    Aortic stenosis 10/07/2022    CKD (chronic kidney disease) 10/07/2022    Acute kidney injury superimposed on chronic kidney disease ([facility] [address] 10/07/2022    Altered mental status 10/06/2022        The  provided the following Interventions:  Initiated a relationship of care and support. Listened empathically. Provided information about Spiritual Care Services. Offered assurance of prayer on patient's behalf. Chart reviewed. The following outcomes where achieved:   confirmed Patient's Buddhism Affiliation. Patient processed feeling about current hospitalization. Patient expressed gratitude for 's visit. Assessment:  Patient does not have any Jain/cultural needs that will affect patients preferences in health care. Plan:  Chaplains will continue to follow and will provide pastoral care on an as needed/requested basis.  recommends bedside caregivers page  on duty if patient shows signs of acute spiritual or emotional distress. Rev.  Ming Núñez, Certified Respecting [address] [place] [address]  [phone]

## 2022-11-15 NOTE — ANTIMICROBIAL STEWARDSHIP
Antimicrobial Stewardship Chart review: With focus on prescribed Antimicrobials, reviewed clinical presentation that includes fever and VS curve or trend, labs, Microbiology, imaging reports and notes as appropriate. Based on this brief analysis, here below are the suggestion. Diagnostic indication in chart for the Antimicrobial/s:    CURRENT ANTIMICROBIALS:acute on chronic renal failure, possible right heart failure leading to cardiorenal syndrome, abn UA with bacteruria >100K- possible UTI. Recent history of \"bad UTI\"    DISCONTINUE THE FOLLOWING ANTIBIOTIC(S):       Rationale:    (  )  No evidence of bacterial infection                          (  )  Microbiology report does not support use                          (  )  Narrowing broad-spectrum empiric coverage                          (  )  Therapeutic duplication: overlapping antimicrobial spectrum                          (  )  Clinical situation dictates change                          (  )  Prolonged duration of therapy not needed                          (  )  Allergy/toxicity/drug interaction present                          (  ) More clinically/cost effective therapy available  ADD ANTIBIOTICS  Rationale:        (  ) Microbiology report supports use                          (  ) Expanded coverage needed: gm positive /negative / anaerobic / atypical infection possible                          (  ) More clinicaly/cost effective therapy than current regimen                          (  ) Needed for synergy                          (  ) Double coverage needed                          (  ) Less toxic therapy                          (  ) Antifungal therapy needed    No  Change in Antibiotics: ( X)                               COMMENTS: adjust abx based on final urine cullture-- >100L GNR, ID and sensitivity pending      This communication is not a consultation. If a thorough analysis of the case is desired, please request an ID consultation.     Glory Wendi Shaw MD  [facility])  Office #:     [phone]   Office Fax: [phone]

## 2022-11-15 NOTE — PROGRESS NOTES
Hospitalist Progress Note    Patient: Lizabeth Perez. MRN: [id number]  CSN: [id number]    YOB: 1940  Age: 78 y.o.   Sex: male    DOA: 11/12/2022 LOS:  LOS: 3 days          Chief Complaint:    ARF      Assessment/Plan     Acute renal failure on chronic kidney disease  Gillis placed  No obst per US  Strict I's and O's  IV Lasix and Zaroxolyn  Nephrology on case     Severe aortic stenosis  Trend troponins  Cardiology is consulted  Holding ACE and ARB right now we will defer on echo  Last EF was 60 to 65% with grade 1 diastolic dysfunction     Thrombocytopenia, chronic  Avoid heparin  SCDs for DVT prevention  on his Promacta 50mg daily  Avoid blood thinners     Hypertension  Cardura and metoprolol     History of Crohn's disease   Questran daily for diarrhea     Hyperglycemia -monitor     Diarrhea  Check stool cultures  Start pro biotics continue Questran     Empiric rocephin for possible UTI     Hypokalemia-PO Kdur     Palliative care consult  PT consult    Could be moving towards dialysis if renal function does not improve    Signed DNR with palliative today     Disposition :  Patient Active Problem List   Diagnosis Code    Altered mental status D06.60    Acute metabolic encephalopathy H84.64    Thrombocytopenia (HCC) D69.6    Aortic stenosis I35.0    CKD (chronic kidney disease) N18.9    Acute kidney injury superimposed on chronic kidney disease (HCC) N17.9, N18.9    Peripheral edema R60.9    Hypoxia R09.02    NAPOLEON (acute kidney injury) ([facility] [address] N17.9       Subjective:    Feeling ok  Resting  No SOB or nausea    Review of systems:    Constitutional: denies fevers, chills  Respiratory: denies SOB, cough  Cardiovascular: denies chest pain  Gastrointestinal: denies nausea, vomiting, diarrhea      Vital signs/Intake and Output:  Visit Vitals  /63 (BP 1 Location: Right upper arm, BP Patient Position: At rest;Lying)   Pulse 75   Temp 97.4 °F (36.3 °C)   Resp 18   Ht 6' 1\" (1.854 m)   Wt 138.8 kg (306 lb) SpO2 99%   BMI 40.37 kg/m²     Current Shift:  No intake/output data recorded. Last three shifts:  11/13 1901 - 11/15 0700  In: 120 [P.O.:120]  Out: 2200 [Urine:2200]    Exam:    General: obese elderly WM, NAD, resting easily  CVS:Regular rate and rhythm, no M/R/G, S1/S2 heard, no thrill  Lungs:Clear to auscultation bilaterally, no wheezes, rhonchi, or rales  Abdomen: Soft, Nontender, No distention, Normal Bowel sounds  Extremities: 3 plus edema LE BL  Neuro:grossly normal , follows commands  Psych:appropriate                Labs: Results:       Chemistry Recent Labs     11/15/22  0417 11/14/22  0100 11/13/22  0655   * 134* 140*    137 139   K 3.5 3.2* 3.3*    107 106   CO2 22 22 22   * 92* 90*   CREA 6.46* 6.09* 5.88*   CA 8.2* 7.8* 8.0*   AGAP 11 8 11   BUCR 16 15 15   AP 82 88 85   TP 5.3* 5.2* 5.1*   ALB 2.1* 2.1* 2.2*   GLOB 3.2 3.1 2.9   AGRAT 0.7* 0.7* 0.8      CBC w/Diff Recent Labs     11/15/22  0417 11/14/22  0100 11/13/22  0655   WBC 2.7* 2.7* 2.2*   RBC 2.70* 2.90* 2.89*   HGB 9.6* 10.0* 10.1*   HCT 27.7* 30.0* 29.5*   PLT 57* 54* 49*   GRANS 65 73 73   LYMPH 20* 15* 15*   EOS 2 2 1      Cardiac Enzymes No results for input(s): CPK, CKND1, CESAR in the last 72 hours. No lab exists for component: CKRMB, TROIP   Coagulation No results for input(s): PTP, INR, APTT, INREXT in the last 72 hours. Lipid Panel Lab Results   Component Value Date/Time    Cholesterol, total 102 10/07/2022 03:45 AM    HDL Cholesterol 42 10/07/2022 03:45 AM    LDL, calculated 47.6 10/07/2022 03:45 AM    VLDL, calculated 12.4 10/07/2022 03:45 AM    Triglyceride 62 10/07/2022 03:45 AM    CHOL/HDL Ratio 2.4 10/07/2022 03:45 AM      BNP No results for input(s): BNPP in the last 72 hours.    Liver Enzymes Recent Labs     11/15/22  0417   TP 5.3*   ALB 2.1*   AP 82      Thyroid Studies No results found for: T4, T3U, TSH, TSHEXT     Procedures/imaging: see electronic medical records for all procedures/Xrays and details which were not copied into this note but were reviewed prior to creation of Kailey Ware MD

## 2022-11-15 NOTE — PROGRESS NOTES
Problem: Mobility Impaired (Adult and Pediatric)  Goal: *Acute Goals and Plan of Care (Insert Text)  Description: Physical Therapy Goals   Initiated 11/14/2022 and to be accomplished within 7 day(s)  1. Patient will move from supine <> sit with SBA in prep for out of bed activity and change of position. 2.  Patient will perform sit<> stand with SBA with RW in prep for transfers/ambulation. 3.  Patient will ambulate 50 feet with S/RW for improved functional mobility at discharge. 4.  Patient will ascend/descend 1stairs with handrail(s) with minimal assistance/contact guard assist for home re-entry as needed. Outcome: Progressing Towards Goal    PHYSICAL THERAPY EVALUATION    Patient: Nathalia Hurtado (78 y.o. male)  Date: 11/14/2022  Primary Diagnosis: Hypoxia [R09.02]  NAPOLEON (acute kidney injury) ([facility] [address] [N17.9]  Aortic stenosis [I35.0]  Peripheral edema [R60.9]  Precautions: Fall  PLOF: amb with RW indoors and with 3 wheeled walker for outdoors, wheezing since current illness. Lives with wife in [facility] with threshold entry. ASSESSMENT :  Based on the objective data described below, the patient is seen on telemetry unit. Pt presents with decr'd functional strength and decr'd activity tolerance, impacting independence in overall functional mobility independence. Pt also with extremely edematous BLE's and LL's redness L>R. Patient reports no pain except chronic back discomfort (note rated). Demonstrates transition to sit EOB with Evelyn/additional time. Transfers STS with RW/min A/pull to stand. Able to take lateral steps R toward [facility] with RW/min/mod A. Slow, shuffling steps with decr'd foot clearance. SOTO and wheezing noted. Pt on RA, O2 sat in mid 90's. Pt left seated EOB with meal tray set up and in NAD. Wife present t/o session and supportive. Answered pt questions regarding PT and mobility; will continue IP PT to address goals above.  Recommend SNF vs HHPT pending progress, for follow up physical therapy upon discharge to reach maximal level of independence/safety with functional mobility. Pt Education: Role of physical therapy in acute care setting, fall prevention and safety/technique during functional mobility tasks    Patient will benefit from skilled intervention to address the above impairments. Patient's rehabilitation potential is considered to be Fair  Factors which may influence rehabilitation potential include:   []         None noted  []         Mental ability/status  [x]         Medical condition  []         Home/family situation and support systems  []         Safety awareness  []         Pain tolerance/management  []         Other:      PLAN :  Recommendations and Planned Interventions:   [x]           Bed Mobility Training             []    Neuromuscular Re-Education  [x]           Transfer Training                   []    Orthotic/Prosthetic Training  [x]           Gait Training                          []    Modalities  [x]           Therapeutic Exercises           []    Edema Management/Control  [x]           Therapeutic Activities            []    Family Training/Education  [x]           Patient Education  []           Other (comment):    Frequency/Duration: Patient will be followed by physical therapy 1-2 times per day/4-7 days per week to address goals. Discharge Recommendations: Home Physical Therapy vs Skilled Nursing Facility  Further Equipment Recommendations for Discharge: N/A    AMPAC: 12/20    This AMPAC score should be considered in conjunction with interdisciplinary team recommendations to determine the most appropriate discharge setting. Patient's social support, diagnosis, medical stability, and prior level of function should also be taken into consideration. SUBJECTIVE:   Patient stated I'm a great wheezer.     OBJECTIVE DATA SUMMARY:     Past Medical History:   Diagnosis Date    Bladder calculus 2015    Chronic kidney disease     H/O kidney stones Crohn disease ([facility] [address]     Foot drop     left    Hypertension     Kidney calculi      Past Surgical History:   Procedure Laterality Date    HX BACK SURGERY      3 back surgery nola and screw    HX CATARACT REMOVAL Bilateral     HX GI      bowel [id number]'s    HX GI      hemorrhoidectomy    HX UROLOGICAL      kidney stone removal     Barriers to Learning/Limitations: yes;  physical  Compensate with: Visual Cues, Verbal Cues, and Tactile Cues  Home Situation:  Home Situation  Home Environment: Private residence  # Steps to Enter: 1 (threshold)  One/Two Story Residence: One story  Living Alone: No  Support Systems: Spouse/Significant Other  Patient Expects to be Discharged to[de-identified] Home with home health  Current DME Used/Available at Home: Areta Nay, rollator (3 wheeled walker for outdoor use)  Critical Behavior:  Neurologic State: Alert; Appropriate for age  Orientation Level: Oriented X4  Cognition: Follows commands  Safety/Judgement: Awareness of environment; Fall prevention  Psychosocial  Patient Behaviors: Calm; Cooperative  Family  Behaviors: Calm;Supportive  Purposeful Interaction: Yes  Pt Identified Daily Priority: Clinical issues (comment)  Caritas Process: Establish trust;Teaching/learning;Nurture loving kindness  Caring Interventions: Reassure  Reassure: Therapeutic listening  Family  Behaviors: Calm;Supportive  Skin Integrity: Other (comment)  Skin Integumentary  Skin Color: Red (L>R Lower leg)  Skin Integrity: Other (comment)  Strength:    Strength: Generally decreased, functional  Tone & Sensation:   Sensation: Intact  Range Of Motion:  AROM: Generally decreased, functional  Functional Mobility:  Bed Mobility:  Supine to Sit: Minimum assistance; Additional time  Scooting: Contact guard assistance; Additional time  Transfers:  Sit to Stand: Minimum assistance  Stand to Sit: Minimum assistance  Balance:   Sitting: Intact  Standing: Impaired;Pull to stand; With support  Standing - Static: Fair  Standing - Dynamic : Fair  Ambulation/Gait Training:  Distance (ft): 2 Feet (ft)  Assistive Device: Walker, rolling  Ambulation - Level of Assistance: Minimal assistance  Gait Description (WDL): Exceptions to WDL  Gait Abnormalities: Decreased step clearance;Shuffling gait; Step to gait  Base of Support: Widened  Speed/Sandra: Slow  Interventions: Safety awareness training; Tactile cues; Verbal cues; Visual/Demos  Pain:  Pain level pre-treatment: 0/10   Pain level post-treatment: 0/10   Pain Intervention(s) : Medication (see MAR); Rest, Ice, Repositioning  Response to intervention: Nurse notified, See doc flow  Activity Tolerance:   Fair/fair-L  SOTO  Please refer to the flowsheet for vital signs taken during this treatment. After treatment:   [x]         Patient left in no apparent distress sitting up EOB  []         Patient left in no apparent distress in bed  [x]         Call bell left within reach  [x]         Nursing notified  [x]         Caregiver present spouse  []         Bed alarm activated  []         SCDs applied    COMMUNICATION/EDUCATION:   [x]         Role of Physical Therapy in the acute care setting. [x]         Fall prevention education was provided and the patient/caregiver indicated understanding. [x]         Patient/family have participated as able in goal setting and plan of care. [x]         Patient/family agree to work toward stated goals and plan of care. []         Patient understands intent and goals of therapy, but is neutral about his/her participation. []         Patient is unable to participate in goal setting/plan of care: ongoing with therapy staff.  []         Other:     Thank you for this referral.  Hansel Rolon, PT   Time Calculation: 30 mins      Eval Complexity: History: HIGH Complexity :3+ comorbidities / personal factors will impact the outcome/ POC Exam:MEDIUM Complexity : 3 Standardized tests and measures addressing body structure, function, activity limitation and / or participation in recreation  Presentation: MEDIUM Complexity : Evolving with changing characteristics  Clinical Decision Making:Medium Complexity    Overall Complexity:MEDIUM    M [employer] AM-PAC® Basic Mobility Inpatient Short Form (6-Clicks) Version 2    How much HELP from another person does the patient currently need    (If the patient hasn't done an activity recently, how much help from another person do you think he/she would need if he/she tried?)   Total (Total A or Dep)   A Lot  (Mod to Max A)   A Little (Sup or Min A)   None (Mod I to I)   Turning from your back to your side while in a flat bed without using bedrails? [] 1 [] 2 [x] 3 [] 4   2. Moving from lying on your back to sitting on the side of a flat bed without using bedrails? [] 1 [] 2 [x] 3 [] 4   3. Moving to and from a bed to a chair (including a wheelchair)? [] 1 [x] 2 [] 3 [] 4   4. Standing up from a chair using your arms (e.g., wheelchair, or bedside chair)? [] 1 [] 2 [x] 3 [] 4   5. Walking in hospital room? [] 1 [x] 2 [] 3 [] 4   6. Climbing 3-5 steps with a railing?+   [] 1 [] 2 [] 3 [] 4   +If stair climbing cannot be assessed, skip item #6. Sum responses from items 1-5. Based on an AM-PAC score of /24 (12/20 if omitting stairs) and their current functional mobility deficits, it is recommended that the patient have 3-5 sessions per week of Physical Therapy at d/c to increase the patient's independence.

## 2022-11-15 NOTE — PROGRESS NOTES
Palliative Medicine Consult    Patient Name: Ed Antony YOB: 1940    Date of Initial Consult: 11/14/2022  Date of follow-up: 11/15/2022  Reason for Consult: Goals of care discussions  Requesting Provider: Dr. Jurgen Sneed  Primary Care Physician: Tiana Gonzalez NP      SUMMARY:   Ed Antony is a 78 y. o. with a past history of Crohn's disease, chronic kidney disease, aortic stenosis requiring valvular surgery, and hypertension, who was admitted on 11/12/2022 from home with a diagnosis of acute renal failure on chronic kidney disease, severe aortic stenosis, thrombocytopenia, and diarrhea. Current medical issues leading to Palliative Medicine involvement include: Goals of care discussions in the setting of advanced age with chronic comorbidities. 11/15/2022: Patient is awake, alert, oriented x4. Denies pain, still has shortness of breath with minimal exertion, and continued leg swelling. PALLIATIVE DIAGNOSES:   Goals of care discussions  Advance care planning  Acute renal failure on chronic kidney disease  Severe aortic stenosis  Thrombocytopenia  Diarrhea  Advanced age/debility       PLAN:   11/15/2022: Palliative medicine team including Jarek Beverly RN and I met with patient at patient's bedside. Patient's wife Mrs. Brenda Valdez also present. Patient is awake, alert, oriented x4. Denies pain, still has shortness of breath with minimal exertion, and continued leg swelling. Patient and wife state that the cardiologist and nephrologist are working together to determine etiology and treatment plan. Support offered. Reviewed goals of care discussions from yesterday. Patient and wife asked for further clarification about the benefits and burdens of CPR/intubation. Education provided. Wife states she did not find any DNR paperwork. Patient is clear that he would not want resuscitation or intubation for any reason, wife supportive of his choice.   Patient signed a POST Form today with the following measures: DNR/DNI, limited additional interventions. He would otherwise like to continue current level of care and treatments. Will sign off as goals of care have been established. Please re-consult should it be warranted. Thank you for the opportunity to provide care to this patient. See previous discussions below:    11/14/2022: Goals of care discussions: Palliative medicine team including Roger Browning RN and I met with patient at patient's bedside. Patient's wife Mrs. Castro Nweman also present. Patient is awake, alert, oriented x4. He denies pain, does have shortness of breath with minimal exertion, leg swelling which makes it difficult to ambulate. Introduced the role as palliative medicine. Patient has an AMD on file naming his wife Allyson Rock as medical power of . Medical update provided. Discussed the benefits and burdens of CPR in the event of cardiopulmonary arrest.  Patient is most concerned about being \"a burden\". Mrs. Castro Newman thinks that they may have completed DNR documents in the past, she is going to look. Patient and wife would like some time to consider their options, and agreed to further discuss code status options. At this time patient and family understand that he remains a full code with full interventions until any other firm decisions are made. We will follow-up for further discussion. Advance care planning: Patient has AMD on file naming his wife Mrs. Castro Newman is medical power of . Patient states he does not have any children. Patient states that this AMD is correct. Wife confirms. Acute renal failure on chronic kidney disease: Gillis placed for strict I&O's, no obstruction per ultrasound, nephrology following  Severe aortic stenosis: Cardiology consulted, last EF was recorded at 60 to 65%, awaiting surgical repair. Diuretics for edema.    Thrombocytopenia: Hematology following, did not respond to steroid, on Promacta since 11/7/2022, with recommendations to continue. Patient has his own supply  Diarrhea: In the setting of recent antibiotic use for UTI, C. difficile test pending. Advanced age/debility: 49-year-old male who lives with wife prior to admission. He ambulates with a walker, lives in a single-family single floor home. He was mostly independent in ADLs prior to admission.    Initial consult note routed to primary continuity provider  Communicated plan of care with: Palliative IDT       GOALS OF CARE / TREATMENT PREFERENCES:   [====Goals of Care====]  GOALS OF CARE: DNR/DNI, limited additional interventions  Patient/Health Care Proxy Stated Goals: Prolong life      TREATMENT PREFERENCES:   Code Status: DNR    Advance Care Planning:  Advance Care Planning 11/14/2022   Patient's Healthcare Decision Maker is: Named in scanned ACP document   Primary Decision Maker Name -   Primary Decision Maker Relationship to Patient -   Confirm Advance Directive Yes, on file   Patient Would Like to Complete Advance Directive -       Medical Interventions: Limited additional interventions           The palliative care team has discussed with patient / health care proxy about goals of care / treatment preferences for patient.  [====Goals of Care====]         HISTORY:     History obtained from: Patient, family, chart    CHIEF COMPLAINT: Dyspnea with exertion, bilateral lower extremity swelling    HPI/SUBJECTIVE:    The patient is:   [x] Verbal and participatory  [] Non-participatory due to:   Oriented x4, engaged in goals of care discussions    Clinical Pain Assessment (nonverbal scale for severity on nonverbal patients):   Clinical Pain Assessment  Severity: 0            FUNCTIONAL ASSESSMENT:     Palliative Performance Scale (PPS):  PPS: 50       PSYCHOSOCIAL/SPIRITUAL SCREENING:     Advance Care Planning:  Advance Care Planning 11/14/2022   Patient's Healthcare Decision Maker is: Named in scanned ACP document   Primary Decision Maker Name -   Primary Decision Maker Relationship to Patient -   Confirm Advance Directive Yes, on file   Patient Would Like to Complete Advance Directive -        Any spiritual / Adventist concerns:  [] Yes /  [x] No    Caregiver Burnout:  [] Yes /  [x] No /  [] No Caregiver Present      Anticipatory grief assessment:   [x] Normal  / [] Maladaptive          REVIEW OF SYSTEMS:     Positive and pertinent negative findings in ROS are noted above in HPI. The following systems were [x] reviewed / [] unable to be reviewed as noted in HPI  Other findings are noted below. Systems: constitutional, ears/nose/mouth/throat, respiratory, gastrointestinal, genitourinary, musculoskeletal, integumentary, neurologic, psychiatric, endocrine. Positive findings noted below. Modified ESAS Completed by: provider   Fatigue: 5       Pain: 0   Anxiety: 0 Nausea: 0   Anorexia: 0 Dyspnea: 2     Constipation: No     Stool Occurrence(s): 1 (large, green and loose)        PHYSICAL EXAM:     From RN flowsheet:  Wt Readings from Last 3 Encounters:   11/15/22 127 kg (280 lb)   10/07/22 122.9 kg (271 lb)   09/06/22 113.4 kg (250 lb)     Blood pressure 105/63, pulse 78, temperature 98 °F (36.7 °C), resp. rate 16, height 6' 1\" (1.854 m), weight 127 kg (280 lb), SpO2 100 %.     Pain Scale 1: Numeric (0 - 10)  Pain Intensity 1: 0                 Constitutional: Awake, alert, lying in bed, no acute distress, appears elderly  Eyes: pupils equal, anicteric  ENMT: no nasal discharge, moist mucous membranes  Cardiovascular: regular rhythm, distal pulses intact, bilateral lower extremity and pedal edema  Respiratory: breathing mildly labored with minimal exertion, symmetric  Gastrointestinal: soft non-tender  Musculoskeletal: no deformity, no tenderness to palpation  Skin: warm, dry  Neurologic: following commands, moving all extremities, oriented x4  Psychiatric: full affect, no hallucinations         HISTORY:     Principal Problem:    NAPOLEON (acute kidney injury) ([facility] [address] (2022)    Active Problems: Aortic stenosis (10/7/2022)      Peripheral edema (2022)      Hypoxia (2022)    Past Medical History:   Diagnosis Date    Bladder calculus     Chronic kidney disease     H/O kidney stones    Crohn disease ([address]     Foot drop     left    Hypertension     Kidney calculi       Past Surgical History:   Procedure Laterality Date    HX BACK SURGERY      3 back surgery nola and screw    HX CATARACT REMOVAL Bilateral     HX GI      bowel [id number]'s    HX GI      hemorrhoidectomy    HX UROLOGICAL      kidney stone removal      History reviewed. No pertinent family history. History reviewed, no pertinent family history.   Social History     Tobacco Use    Smoking status: Former     Types: Cigarettes     Quit date: 2000     Years since quittin.2    Smokeless tobacco: Never   Substance Use Topics    Alcohol use: No     No Known Allergies   Current Facility-Administered Medications   Medication Dose Route Frequency    spironolactone (ALDACTONE) tablet 100 mg  100 mg Oral DAILY    doxazosin (CARDURA) tablet 4 mg  4 mg Oral QHS    magnesium oxide (MAG-OX) tablet 400 mg  400 mg Oral QHS    ferrous sulfate tablet 325 mg  1 Tablet Oral BID WITH MEALS    cetirizine (ZYRTEC) tablet 10 mg  10 mg Oral QHS    furosemide (LASIX) injection 80 mg  80 mg IntraVENous TID    metOLazone (ZAROXOLYN) tablet 10 mg  10 mg Oral DAILY    insulin lispro (HUMALOG) injection   SubCUTAneous AC&HS    glucose chewable tablet 16 g  4 Tablet Oral PRN    glucagon (GLUCAGEN) injection 1 mg  1 mg IntraMUSCular PRN    dextrose 10% infusion 0-250 mL  0-250 mL IntraVENous PRN    eltrombopag (PROMACTA) tablet 50 mg (Patient Supplied)  50 mg Oral ACB    cefTRIAXone (ROCEPHIN) 1 g in 0.9% sodium chloride (MBP/ADV) 50 mL MBP  1 g IntraVENous Q24H    Lactobacillus Acidoph & Bulgar (FLORANEX) tablet 2 Tablet  2 Tablet Oral BID          LAB AND IMAGING FINDINGS:     Lab Results   Component Value Date/Time WBC 2.7 (L) 11/15/2022 04:17 AM    HGB 9.6 (L) 11/15/2022 04:17 AM    PLATELET 57 (L) 11/25/8062 04:17 AM     Lab Results   Component Value Date/Time    Sodium 138 11/15/2022 04:17 AM    Potassium 3.5 11/15/2022 04:17 AM    Chloride 105 11/15/2022 04:17 AM    CO2 22 11/15/2022 04:17 AM     (H) 11/15/2022 04:17 AM    Creatinine 6.46 (H) 11/15/2022 04:17 AM    Calcium 8.2 (L) 11/15/2022 04:17 AM    Magnesium 2.2 11/15/2022 04:17 AM      Lab Results   Component Value Date/Time    Alk. phosphatase 82 11/15/2022 04:17 AM    Protein, total 5.3 (L) 11/15/2022 04:17 AM    Albumin 2.1 (L) 11/15/2022 04:17 AM    Globulin 3.2 11/15/2022 04:17 AM     Lab Results   Component Value Date/Time    INR 1.5 (H) 09/06/2022 06:00 PM    Prothrombin time 18.4 (H) 09/06/2022 06:00 PM    aPTT 40.5 (H) 09/06/2022 06:00 PM      No results found for: IRON, FE, TIBC, IBCT, PSAT, FERR   No results found for: PH, PCO2, PO2  No components found for: GLPOC   No results found for: CPK, CKMB             Total time: 35 minutes  Counseling / coordination time, spent as noted above:   > 50% counseling / coordination?:  Yes, patient, family, medical team    Prolonged service was provided for  []30 min   []75 min in face to face time in the presence of the patient, spent as noted above.   Time Start:   Time End:

## 2022-11-16 ENCOUNTER — APPOINTMENT (OUTPATIENT)
Dept: VASCULAR SURGERY | Age: 82
DRG: 682 | End: 2022-11-16
Attending: STUDENT IN AN ORGANIZED HEALTH CARE EDUCATION/TRAINING PROGRAM
Payer: MEDICARE

## 2022-11-16 ENCOUNTER — APPOINTMENT (OUTPATIENT)
Dept: VASCULAR SURGERY | Age: 82
DRG: 682 | End: 2022-11-16
Attending: INTERNAL MEDICINE
Payer: MEDICARE

## 2022-11-16 ENCOUNTER — APPOINTMENT (OUTPATIENT)
Dept: CT IMAGING | Age: 82
DRG: 682 | End: 2022-11-16
Attending: INTERNAL MEDICINE
Payer: MEDICARE

## 2022-11-16 ENCOUNTER — APPOINTMENT (OUTPATIENT)
Dept: NON INVASIVE DIAGNOSTICS | Age: 82
DRG: 682 | End: 2022-11-16
Attending: INTERNAL MEDICINE
Payer: MEDICARE

## 2022-11-16 LAB
ALBUMIN SERPL-MCNC: 2.1 G/DL (ref 3.4–5)
ALBUMIN/GLOB SERPL: 0.8 {RATIO} (ref 0.8–1.7)
ALP SERPL-CCNC: 80 U/L (ref 45–117)
ALT SERPL-CCNC: 26 U/L (ref 16–61)
ANION GAP SERPL CALC-SCNC: 13 MMOL/L (ref 3–18)
AST SERPL-CCNC: 26 U/L (ref 10–38)
BACTERIA SPEC CULT: ABNORMAL
BASOPHILS # BLD: 0 K/UL (ref 0–0.1)
BASOPHILS NFR BLD: 0 % (ref 0–2)
BILIRUB SERPL-MCNC: 1.3 MG/DL (ref 0.2–1)
BUN SERPL-MCNC: 104 MG/DL (ref 7–18)
BUN/CREAT SERPL: 16 (ref 12–20)
CALCIUM SERPL-MCNC: 8.1 MG/DL (ref 8.5–10.1)
CC UR VC: ABNORMAL
CHLORIDE SERPL-SCNC: 104 MMOL/L (ref 100–111)
CO2 SERPL-SCNC: 22 MMOL/L (ref 21–32)
CREAT SERPL-MCNC: 6.54 MG/DL (ref 0.6–1.3)
DIFFERENTIAL METHOD BLD: ABNORMAL
ECHO AV AREA PEAK VELOCITY: 1.4 CM2
ECHO AV AREA VTI: 1.9 CM2
ECHO AV AREA/BSA PEAK VELOCITY: 0.6 CM2/M2
ECHO AV AREA/BSA VTI: 0.8 CM2/M2
ECHO AV MEAN GRADIENT: 30 MMHG
ECHO AV MEAN VELOCITY: 2.5 M/S
ECHO AV PEAK GRADIENT: 61 MMHG
ECHO AV PEAK VELOCITY: 3.9 M/S
ECHO AV VELOCITY RATIO: 0.41
ECHO AV VTI: 71.1 CM
ECHO EST RA PRESSURE: 8 MMHG
ECHO IVC PROX: 1.7 CM
ECHO LV GLOBAL LONGITUDINAL STRAIN (GLS): -22.1 %
ECHO LVOT AREA: 3.5 CM2
ECHO LVOT AV VTI INDEX: 0.52
ECHO LVOT DIAM: 2.1 CM
ECHO LVOT MEAN GRADIENT: 5 MMHG
ECHO LVOT PEAK GRADIENT: 10 MMHG
ECHO LVOT PEAK VELOCITY: 1.6 M/S
ECHO LVOT STROKE VOLUME INDEX: 51.9 ML/M2
ECHO LVOT SV: 127.7 ML
ECHO LVOT VTI: 36.9 CM
ECHO PULMONARY ARTERY SYSTOLIC PRESSURE (PASP): 37 MMHG
ECHO RIGHT VENTRICULAR SYSTOLIC PRESSURE (RVSP): 37 MMHG
ECHO RV TAPSE: 1.8 CM (ref 1.7–?)
ECHO RV TAPSE: 2.2 CM (ref 1.7–?)
ECHO TV REGURGITANT MAX VELOCITY: 2.67 M/S
ECHO TV REGURGITANT PEAK GRADIENT: 29 MMHG
EOSINOPHIL # BLD: 0.1 K/UL (ref 0–0.4)
EOSINOPHIL NFR BLD: 2 % (ref 0–5)
ERYTHROCYTE [DISTWIDTH] IN BLOOD BY AUTOMATED COUNT: 14.5 % (ref 11.6–14.5)
FERRITIN SERPL-MCNC: 387 NG/ML (ref 8–388)
GLOBULIN SER CALC-MCNC: 2.8 G/DL (ref 2–4)
GLUCOSE BLD STRIP.AUTO-MCNC: 126 MG/DL (ref 70–110)
GLUCOSE BLD STRIP.AUTO-MCNC: 131 MG/DL (ref 70–110)
GLUCOSE BLD STRIP.AUTO-MCNC: 140 MG/DL (ref 70–110)
GLUCOSE BLD STRIP.AUTO-MCNC: 251 MG/DL (ref 70–110)
GLUCOSE SERPL-MCNC: 123 MG/DL (ref 74–99)
HCT VFR BLD AUTO: 29 % (ref 36–48)
HGB BLD-MCNC: 9.9 G/DL (ref 13–16)
IMM GRANULOCYTES # BLD AUTO: 0 K/UL (ref 0–0.04)
IMM GRANULOCYTES NFR BLD AUTO: 0 % (ref 0–0.5)
INR PPP: 1.3 (ref 0.8–1.2)
IRON SATN MFR SERPL: 52 % (ref 20–50)
IRON SERPL-MCNC: 101 UG/DL (ref 50–175)
LYMPHOCYTES # BLD: 0.5 K/UL (ref 0.9–3.6)
LYMPHOCYTES NFR BLD: 20 % (ref 21–52)
MAGNESIUM SERPL-MCNC: 2.3 MG/DL (ref 1.6–2.6)
MCH RBC QN AUTO: 34.6 PG (ref 24–34)
MCHC RBC AUTO-ENTMCNC: 34.1 G/DL (ref 31–37)
MCV RBC AUTO: 101.4 FL (ref 78–100)
MONOCYTES # BLD: 0.3 K/UL (ref 0.05–1.2)
MONOCYTES NFR BLD: 12 % (ref 3–10)
NEUTS SEG # BLD: 1.5 K/UL (ref 1.8–8)
NEUTS SEG NFR BLD: 65 % (ref 40–73)
NRBC # BLD: 0 K/UL (ref 0–0.01)
NRBC BLD-RTO: 0 PER 100 WBC
PLATELET # BLD AUTO: 63 K/UL (ref 135–420)
PMV BLD AUTO: 11.1 FL (ref 9.2–11.8)
POTASSIUM SERPL-SCNC: 3.6 MMOL/L (ref 3.5–5.5)
PROT SERPL-MCNC: 4.9 G/DL (ref 6.4–8.2)
PROTHROMBIN TIME: 16.6 SEC (ref 11.5–15.2)
RBC # BLD AUTO: 2.86 M/UL (ref 4.35–5.65)
RHEUMATOID FACT SERPL-ACNC: <10 IU/ML
SERVICE CMNT-IMP: ABNORMAL
SODIUM SERPL-SCNC: 139 MMOL/L (ref 136–145)
TIBC SERPL-MCNC: 193 UG/DL (ref 250–450)
WBC # BLD AUTO: 2.3 K/UL (ref 4.6–13.2)

## 2022-11-16 PROCEDURE — 99223 1ST HOSP IP/OBS HIGH 75: CPT | Performed by: INTERNAL MEDICINE

## 2022-11-16 PROCEDURE — 82728 ASSAY OF FERRITIN: CPT

## 2022-11-16 PROCEDURE — 71250 CT THORAX DX C-: CPT

## 2022-11-16 PROCEDURE — 85025 COMPLETE CBC W/AUTO DIFF WBC: CPT

## 2022-11-16 PROCEDURE — 97530 THERAPEUTIC ACTIVITIES: CPT

## 2022-11-16 PROCEDURE — 86038 ANTINUCLEAR ANTIBODIES: CPT

## 2022-11-16 PROCEDURE — 83540 ASSAY OF IRON: CPT

## 2022-11-16 PROCEDURE — 86037 ANCA TITER EACH ANTIBODY: CPT

## 2022-11-16 PROCEDURE — 74011250637 HC RX REV CODE- 250/637: Performed by: INTERNAL MEDICINE

## 2022-11-16 PROCEDURE — 74011250636 HC RX REV CODE- 250/636: Performed by: INTERNAL MEDICINE

## 2022-11-16 PROCEDURE — 82784 ASSAY IGA/IGD/IGG/IGM EACH: CPT

## 2022-11-16 PROCEDURE — 93308 TTE F-UP OR LMTD: CPT

## 2022-11-16 PROCEDURE — 86803 HEPATITIS C AB TEST: CPT

## 2022-11-16 PROCEDURE — 74011250636 HC RX REV CODE- 250/636: Performed by: STUDENT IN AN ORGANIZED HEALTH CARE EDUCATION/TRAINING PROGRAM

## 2022-11-16 PROCEDURE — 74011000258 HC RX REV CODE- 258: Performed by: INTERNAL MEDICINE

## 2022-11-16 PROCEDURE — 87340 HEPATITIS B SURFACE AG IA: CPT

## 2022-11-16 PROCEDURE — 36415 COLL VENOUS BLD VENIPUNCTURE: CPT

## 2022-11-16 PROCEDURE — 83735 ASSAY OF MAGNESIUM: CPT

## 2022-11-16 PROCEDURE — 93970 EXTREMITY STUDY: CPT

## 2022-11-16 PROCEDURE — 74011636637 HC RX REV CODE- 636/637: Performed by: INTERNAL MEDICINE

## 2022-11-16 PROCEDURE — 93975 VASCULAR STUDY: CPT

## 2022-11-16 PROCEDURE — 86431 RHEUMATOID FACTOR QUANT: CPT

## 2022-11-16 PROCEDURE — 87389 HIV-1 AG W/HIV-1&-2 AB AG IA: CPT

## 2022-11-16 PROCEDURE — 97116 GAIT TRAINING THERAPY: CPT

## 2022-11-16 PROCEDURE — 85610 PROTHROMBIN TIME: CPT

## 2022-11-16 PROCEDURE — 65270000046 HC RM TELEMETRY

## 2022-11-16 PROCEDURE — 82962 GLUCOSE BLOOD TEST: CPT

## 2022-11-16 PROCEDURE — 86160 COMPLEMENT ANTIGEN: CPT

## 2022-11-16 PROCEDURE — 80053 COMPREHEN METABOLIC PANEL: CPT

## 2022-11-16 RX ORDER — METOLAZONE 5 MG/1
5 TABLET ORAL DAILY
Status: DISCONTINUED | OUTPATIENT
Start: 2022-11-17 | End: 2022-11-17

## 2022-11-16 RX ORDER — FUROSEMIDE 10 MG/ML
80 INJECTION INTRAMUSCULAR; INTRAVENOUS 2 TIMES DAILY
Status: DISCONTINUED | OUTPATIENT
Start: 2022-11-16 | End: 2022-11-17

## 2022-11-16 RX ADMIN — FERROUS SULFATE TAB 325 MG (65 MG ELEMENTAL FE) 325 MG: 325 (65 FE) TAB at 08:46

## 2022-11-16 RX ADMIN — Medication 400 MG: at 21:23

## 2022-11-16 RX ADMIN — FUROSEMIDE 80 MG: 10 INJECTION, SOLUTION INTRAMUSCULAR; INTRAVENOUS at 21:23

## 2022-11-16 RX ADMIN — SPIRONOLACTONE 100 MG: 100 TABLET ORAL at 08:45

## 2022-11-16 RX ADMIN — FUROSEMIDE 80 MG: 10 INJECTION, SOLUTION INTRAMUSCULAR; INTRAVENOUS at 08:45

## 2022-11-16 RX ADMIN — METOLAZONE 10 MG: 5 TABLET ORAL at 08:45

## 2022-11-16 RX ADMIN — CETIRIZINE HYDROCHLORIDE 10 MG: 10 TABLET, FILM COATED ORAL at 21:23

## 2022-11-16 RX ADMIN — Medication 2 TABLET: at 08:44

## 2022-11-16 RX ADMIN — Medication 2 TABLET: at 21:23

## 2022-11-16 RX ADMIN — DOXAZOSIN MESYLATE 4 MG: 4 TABLET ORAL at 21:23

## 2022-11-16 RX ADMIN — Medication 6 UNITS: at 18:03

## 2022-11-16 RX ADMIN — CEFTRIAXONE 1 G: 1 INJECTION, POWDER, FOR SOLUTION INTRAMUSCULAR; INTRAVENOUS at 06:32

## 2022-11-16 RX ADMIN — FERROUS SULFATE TAB 325 MG (65 MG ELEMENTAL FE) 325 MG: 325 (65 FE) TAB at 18:02

## 2022-11-16 NOTE — CONSULTS
[address] [place] [address], MD, FACP, [place], [place]      TL Pace S Josefa, [facility]   Janusz Cortez, [facility]-   Josué Celeste, DEANNA Mcintosh, MICHELLE-BC      [address]   at [address], [address]   [phone]   FAX: [phone]  [facility] [place] [facility]   at [address], [address]   [phone]   FAX: [phone]         HEPATOLOGY CONSULT NOTE  I was asked to see this patient in consultation for management of cirrhosis. I have reviewed the   Emergency room note, Hospital admission note, Notes by all other physicians who have seen the patient during this hospitalization to date. I have reviewed the problem list, all past medical history and the reason for this hospitalization. I have reviewed the allergies and the medications the patient was taking at home prior to this hospitalization. HISTORY:  The patient is a 78 y.o.  male without a history of chronic liver disease. He was hospitalized for progressive increase in lower extremity edema and found to have NAPOLEON, thrombocytopenia and cirrhosis. In the ED Laboratory studies were significant for:    WBC 2.5 , HB 11.0 gms, PLT 65, Sna 139, Scr 5.92 mg, AST 27, ALT 38, ALP 96, TBILI 1.9 mg,     CT scan of the abdomen demonstrated changes consistent with cirrhosis. No liver masses. Moderate ascites      An ECHO of heart demonstrated normal LVEF 65-70%, normal PAP, normal RV, trace TR. Hepatology Plan:  US paracentesis in AM.  Cell count and albumin to calculate SAAG  Serology for all causes of chronic liver disease. Some have been ordered. I will other others. I will be [facility] at [facility] on Monday. Given degree of NAPOLEON I expect he will still be here. Will assess results at time and see if he needs Hepatic venous pressure measurements and transjugular liver biopsy      ASSESSMENT AND PLAN:  Cirrhosis  The diagnosis of cirrhosis is based upon imaging, laboratory studies, complications of cirrhosis. Cirrhosis is of unclear etiology. The patient has normal liver function. The patient has never developed any complications of cirrhosis to date. The CTP is 7. Child class B. The MELD score is 12. Serologic testing for causes of chronic liver disease was ordered. Ascites   Ascites developed for the first time in 11/2022. This is probably die to a combination of cirrhosis and NAPOLEON    Will perform paracentesis and calculate SAAG. Will check cell count and make sure there is no SBP    Cannot use diuretics because of NAPOLEON    Lower extremity edema  Edema is severe 4+ and due to a combination of cirrhosis and NAPOLEON. Acute kidney injury  The patient has developed an elevation in serum creatinine to 6.4 mg  NAPOLEON is secondary to unclear etiology. Scheduled for renal biopsy in AM.    Screening for Esophageal varices   The patient has not had an EGD to screen for varices. Will schedule for EGD to assess for varices on Monday. Anemia   This is due to multifactorial causes including portal hypertension with chronic GI blood loss,   The HB on admission was 11 gms. This has drifted down during the hositalization. There is no evidence of active GI blood loss. Will perform EGD Monday AM.    Thrombocytopenia   This is secondary to cirrhosis. There is no evidence of overt bleeding. No treatment is required. The platelet count is adequate for the patient to undergo procedures without the need for platelet transfusion or platelet growth factors. SYSTEM REVIEW:  Constitution systems: Negative for fever, chills, weight gain, weight loss. Eyes: Negative for visual changes. ENT: Negative for sore throat, painful swallowing.    Respiratory: Negative for cough, hemoptysis, SOB. Cardiology: Negative for chest pain, palpitations. GI:  Negative for constipation or diarrhea. : Negative for urinary frequency, dysuria, hematuria, nocturia. Skin: Negative for rash. Hematology: Negative for easy bruising, blood clots. Musculo-skelatal: Negative for back pain, muscle pain, weakness. Neurologic: Negative for headaches, dizziness, vertigo, memory problems not related to HE. Psychology: Negative for anxiety, depression. FAMILY HISTORY:  The father  of cancer. The mother  of unknown cause. There is no family history of liver disease. SOCIAL HISTORY:  The patient is . The patient has no children. The patient stopped using tobacco products in 1960s. The patient has previously consumed alcohol socially never in excess. 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 PHYSICAL EXAMINATION:  VS: per nursing note  General:  No acute distress. Eyes:  Sclera anicteric. ENT:  No oral lesions. Thyroid normal.  Nodes:  No adenopathy. Skin:  No spider angiomata. No jaundice. Ecchymosis  Respiratory:  Lungs clear to auscultation. Cardiovascular:  Regular heart rate. Abdomen:  Distended. Tight. Ascites appears to be present. Extremities:  4+ lower extremity edema. Neurologic:  Alert and oriented. Cranial nerves grossly intact. No asterixis.     LABORATORY:   Latest Reference Range & Units 22 20:15 22 06:55 22 01:00 11/15/22 04:17 22 04:02   WBC 4.6 - 13.2 K/uL 2.5 (L) 2.2 (L) 2.7 (L) 2.7 (L) 2.3 (L)   HGB 13.0 - 16.0 g/dL 11.0 (L) 10.1 (L) 10.0 (L) 9.6 (L) 9.9 (L)   PLATELET 899 - 088 K/uL 65 (L) 49 (L) 54 (L) 57 (L) 63 (L)   Sodium 136 - 145 mmol/L 139 139 137 138 139   Potassium 3.5 - 5.5 mmol/L 3.6 3.3 (L) 3.2 (L) 3.5 3.6   Chloride 100 - 111 mmol/L 107 106 107 105 104   CO2 21 - 32 mmol/L 20 (L) 22 22 22 22   Glucose 74 - 99 mg/dL 159 (H) 140 (H) 134 (H) 130 (H) 123 (H)   BUN 7.0 - 18 MG/DL 87 (H) 90 (H) 92 (H) 101 (H) 104 (H)   Creatinine 0.6 - 1.3 MG/DL 5.62 (H) 5.88 (H) 6.09 (H) 6.46 (H) 6.54 (H)   Bilirubin, total 0.2 - 1.0 MG/DL 1.9 (H) 2.0 (H) 1.3 (H) 1.3 (H) 1.3 (H)   Albumin 3.4 - 5.0 g/dL 2.4 (L) 2.2 (L) 2.1 (L) 2.1 (L) 2.1 (L)   ALT 16 - 61 U/L 38 32 33 28 26   AST 10 - 38 U/L 27 21 20 18 26   Alk.  phosphatase 45 - 117 U/L 96 85 88 82 80   (L): Data is abnormally low  (H): Data is abnormally high      Juliana Lyons MD  [address], [address] [place] [address] Street - [address]  [phone]  FAX: [address] [place] [address]

## 2022-11-16 NOTE — PROGRESS NOTES
Problem: Risk for Spread of Infection  Goal: Prevent transmission of infectious organism to others  Description: Prevent the transmission of infectious organisms to other patients, staff members, and visitors. Outcome: Progressing Towards Goal     Problem: Patient Education:  Go to Education Activity  Goal: Patient/Family Education  Outcome: Progressing Towards Goal     Problem: Falls - Risk of  Goal: *Absence of Falls  Description: Document Gary Burton Fall Risk and appropriate interventions in the flowsheet. Outcome: Progressing Towards Goal  Note: Fall Risk Interventions:  Mobility Interventions: Strengthening exercises (ROM-active/passive), Utilize walker, cane, or other assistive device         Medication Interventions: Teach patient to arise slowly, Patient to call before getting OOB    Elimination Interventions: Call light in reach              Problem: Diabetes Self-Management  Goal: *Disease process and treatment process  Description: Define diabetes and identify own type of diabetes; list 3 options for treating diabetes.   Outcome: Progressing Towards Goal

## 2022-11-16 NOTE — PROGRESS NOTES
D/C Plan: Discharge to SNF pending medically appropriate, insurance auth and bed availability    CM notes therapy recommendations for SNF. CM to meet with patient to offer [facility]. CM rounded with physician who reported that patient will get a renal biopsy tomorrow. Patient is expected to remain through the weekend at this time. Care Management Interventions  PCP Verified by CM:  Yes  Mode of Transport at Discharge: Self  Transition of Care Consult (CM Consult): Discharge Planning  Support Systems: Spouse/Significant Other  Confirm Follow Up Transport: Family  The Plan for Transition of Care is Related to the Following Treatment Goals : acute renal failure  The Patient and/or Patient Representative was Provided with a Choice of Provider and Agrees with the Discharge Plan?: Yes  Name of the Patient Representative Who was Provided with a Choice of Provider and Agrees with the Discharge Plan: Cherie Olszewski, patient  Discharge Location  Patient Expects to be Discharged to[de-identified] Skilled nursing facility

## 2022-11-16 NOTE — PROGRESS NOTES
Patient:  José Manuel Russell :  1940  Gender:  male  MRN #:  [id number]    Assessment:     José Manuel Russell is a 78y.o. year old male with ongoing CKD baseline cr of 2.3 range. 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  Probably right sided heart failure   Thrombocytopenia   Anemia     Plan:   His breathing is stable so far , has decent urine output, but creatinine is worsening   He does not have hematuria and significant protein uria which suggest glomerulonephritis but still possibility is not excluded , level of creatinine is out of proportion with ECHO findings and neither has urinary obstruction to explain this . Renal biopsy would probably give us answer but he   Is at risk for biopsy due to leukopenia/anemia and thrombocytopenia. I discussed the risk and benefit of renal biopsy and possibility of seeing only chronic findings which is not treatable.  He is okay to proceed with biopsy understanding the risk especially bleeding , if it is clinically indicated , will discuss with IR   - in the mean time will send serology testing , renal artery doppler, Immunological testing, HIV screen ( gave verbal consent)     No indications of dialysis but eventually he might need it if renal functions continues to deteriorate  Hold aldactone, change lasix to 80 mg iv BID only and decrease metolazone to 5 mg daily   Appreciate cardiology assistance   Less than 2 gram salt   Intake and output   Dose all meds for current EGFR   Anemia workup , add ferritin and iron level       Addendum to previously signed note   - Discussed with IR for renal biopsy on 22  - will check INR today -if it is > 1.5 please give vitamin K 10 mg   - recheck CBC in morning   -will give DDAVP 0.3 mcg/kg IV 30 minutes prior to procedure on - to minimize uremic bleed  - He has not had aspirin/plavix in last 7 days   - NPO after midnight         Subjective/Interval Events    Says he is good   Urinating fine   No shortness of breath , no other symptoms       Blood pressure 107/65, pulse 83, temperature 98 °F (36.7 °C), resp. rate 18, height 6' (1.829 m), weight 127 kg (280 lb), SpO2 100 %. Exam:    Pt awake and alert  Lung: clear to auscultation  Heart: s1s2 regualr no rubs or murmur  Abdomen: soft, non tender, no guarding, normal bowel sounds. Ext: 1 + edema and skin rash   CNS- Oriented to time , place and person     [unfilled]   Recent Labs     11/16/22 0402   WBC 2.3*   HCT 29.0*   .4*       Recent Labs     11/16/22 0402      CO2 22   *       Recent Labs     11/16/22 0402   AGRAT 0.8       USG retroperitoneum: - Increased left and right renal cortical echogenicity, suggestive of chronic  medical renal disease. No obstructive uropathy or acute sonographic  abnormality. Medical Decision Making :    I have reviewed patient's record for pertinent labs, radiology and other test . I have reviewed old records. I have ordered labs, medications and radiology as necessary and indicated per patient's condition . Total time spent is approximately 35 minutes. Greater than 50 % of that time was spent in counseling and or care coordination.      Júnior Roberts MD  Nephrology -[facility], [facility].

## 2022-11-16 NOTE — PROGRESS NOTES
Problem: Self Care Deficits Care Plan (Adult)  Goal: *Acute Goals and Plan of Care (Insert Text)  Description: Occupational Therapy Goals  Initiated 11/15/2022 within 7 day(s). 1.  Patient will perform grooming with minimal assistance/contact guard assist standing at sink for 5 minutes or more. 2.  Patient will perform upper body dressing with supervision/set-up. 3.  Patient will perform lower body dressing with moderate assistance . 4. Patient will perform toilet transfers with minimal assistance/contact guard assist.  5.  Patient will perform all aspects of toileting with minimal assistance/contact guard assist.  6.  Patient will participate in upper extremity therapeutic exercise/activities with supervision/set-up for 10 minutes. 7.  Patient will utilize energy conservation techniques during functional activities with verbal, visual, and tactile cues. OCCUPATIONAL THERAPY EVALUATION    Patient: Christine Salomon (78 y.o. male)  Date: 11/15/2022  Primary Diagnosis: Hypoxia [R09.02]  NAPOLEON (acute kidney injury) ([facility] [address] [N17.9]  Aortic stenosis [I35.0]  Peripheral edema [R60.9]       Precautions:   Fall  PLOF: mod I for ADLs and transfers     ASSESSMENT :  Based on the objective data described below, the patient presents with decreased strength, endurance and balance for carryover of ADLs and transfers following above mentioned medical diagnosis. Pt presented supine in bed and requests to assist with meal set-up. Pt performed bed mobility, supine to sit with mod A during this session. Pt was left seated EOB at the end of session in NAD . Patient will benefit from skilled intervention to address the above impairments.   Patient's rehabilitation potential is considered to be Good  Factors which may influence rehabilitation potential include:   []             None noted  []             Mental ability/status  [x]             Medical condition  []             Home/family situation and support systems  [] Safety awareness  []             Pain tolerance/management  []             Other:      PLAN :  Recommendations and Planned Interventions:   [x]               Self Care Training                  [x]      Therapeutic Activities  [x]               Functional Mobility Training   []      Cognitive Retraining  [x]               Therapeutic Exercises           [x]      Endurance Activities  [x]               Balance Training                    []      Neuromuscular Re-Education  []               Visual/Perceptual Training     [x]      Home Safety Training  [x]               Patient Education                   [x]      Family Training/Education  []               Other (comment):    Frequency/Duration: Patient will be followed by occupational therapy 1-2 times per day/4-7 days per week to address goals. Discharge Recommendations: Skilled Nursing Facility  Further Equipment Recommendations for Discharge: N/A    AMPAC: 16/24    This AMPA score should be considered in conjunction with interdisciplinary team recommendations to determine the most appropriate discharge setting. Patient's social support, diagnosis, medical stability, and prior level of function should also be taken into consideration. SUBJECTIVE:   Patient stated  I am doing alright.     OBJECTIVE DATA SUMMARY:     Past Medical History:   Diagnosis Date    Bladder calculus 2015    Chronic kidney disease     H/O kidney stones    Crohn disease ([facility] [address]     Foot drop     left    Hypertension     Kidney calculi      Past Surgical History:   Procedure Laterality Date    HX BACK SURGERY      3 back surgery nola and screw    HX CATARACT REMOVAL Bilateral     HX GI      bowel [id number]    HX GI      hemorrhoidectomy    HX UROLOGICAL      kidney stone removal     Barriers to Learning/Limitations: None  Compensate with: visual, verbal, tactile, kinesthetic cues/model    Home Situation:   Home Situation  Home Environment: Private residence  # Steps to Enter: 1  One/Two Story Residence: One story  Living Alone: No  Support Systems: Spouse/Significant Other  Patient Expects to be Discharged to[de-identified] Skilled nursing facility  Current DME Used/Available at Home: Cassie Bello, rollator  Tub or Shower Type: Shower  []  Right hand dominant   []  Left hand dominant    Cognitive/Behavioral Status:  Neurologic State: Alert  Orientation Level: Oriented X4  Cognition: Appropriate for age attention/concentration; Follows commands  Safety/Judgement: Fall prevention    Skin: intact  Edema: none    Vision/Perceptual:    Tracking: Able to track stimulus in all quadrants w/o difficulty    Coordination: BUE  Coordination: Within functional limits  Fine Motor Skills-Upper: Left Intact; Right Intact    Gross Motor Skills-Upper: Left Intact; Right Intact  Balance:  Sitting: Intact  Standing: Impaired; With support  Standing - Static: Fair  Standing - Dynamic : Fair  Strength: BUE  Strength: Generally decreased, functional  Tone & Sensation: BUE  Sensation: Intact  Range of Motion: BUE  AROM: Generally decreased, functional  Functional Mobility and Transfers for ADLs:  Bed Mobility:  Supine to Sit: Minimum assistance  Sit to Supine: Minimum assistance  Scooting: Minimum assistance;Contact guard assistance  Transfers:  Sit to Stand: Minimum assistance; Moderate assistance; Additional time (from elevated bed)  Stand to Sit: Minimum assistance  ADL Assessment:   Feeding: Setup    Oral Facial Hygiene/Grooming: Minimum assistance    Upper Body Dressing: Minimum assistance    Lower Body Dressing: Maximum assistance    Toileting: Moderate assistance;Maximum assistance  ADL Intervention:  Cognitive Retraining  Safety/Judgement: Fall prevention  Pain:  Pain level pre-treatment: 0/10   Pain level post-treatment: 0/10   Pain Intervention(s): Medication (see MAR);  Rest, Ice, Repositioning   Response to intervention: Nurse notified, See doc flow    Activity Tolerance:   Good     Please refer to the flowsheet for vital signs taken during this treatment. After treatment:   [x] Patient left in no apparent distress sitting up EOB  [] Patient left in no apparent distress in bed  [x] Call bell left within reach  [x] Nursing notified  [x] Caregiver present  [] Bed alarm activated    COMMUNICATION/EDUCATION:   [x] Role of Occupational Therapy in the acute care setting  [x] Home safety education was provided and the patient/caregiver indicated understanding. [x] Patient/family have participated as able in goal setting and plan of care. [x] Patient/family agree to work toward stated goals and plan of care. [] Patient understands intent and goals of therapy, but is neutral about his/her participation. [] Patient is unable to participate in goal setting and plan of care. Thank you for this referral.  Gabby Nelson OTR/L  Time Calculation: 16 mins    Eval Complexity: History: MEDIUM Complexity : Expanded review of history including physical, cognitive and psychosocial  history ; Examination: MEDIUM Complexity : 3-5 performance deficits relating to physical, cognitive , or psychosocial skils that result in activity limitations and / or participation restrictions; Decision Making:MEDIUM Complexity : Patient may present with comorbidities that affect occupational performnce. Miniml to moderate modification of tasks or assistance (eg, physical or verbal ) with assesment(s) is necessary to enable patient to complete evaluation     [facility]-PAC® Daily Activity Inpatient Short Form (6-Clicks)*    How much HELP from another person does the patient currently need    (If the patient hasn't done an activity recently, how much help from another person do you think he/she would need if he/she tried?)   Total (Total A or Dep)   A Lot  (Mod to Max A)   A Little (Sup or Min A)   None (Mod I to I)   Putting on and taking off regular lower body clothing? [] 1 [x] 2 [] 3 [] 4   2. Bathing (including washing, rinsing,      drying)? [] 1 [x] 2 [] 3 [] 4   3. Toileting, which includes using toilet, bedpan or urinal?   [] 1 [x] 2 [] 3 [] 4   4. Putting on and taking off regular upper body clothing? [] 1 [] 2 [x] 3 [] 4   5. Taking care of personal grooming such as brushing teeth? [] 1 [] 2 [x] 3 [] 4   6. Eating meals? [] 1 [] 2 [] 3 [x] 4     Based on an AM-PAC score of **/24 and their current ADL deficits; it is recommended that the patient have 5-7 sessions per week of Occupational Therapy at d/c to increase the patient's independence. Currently, this patient demonstrates the potential endurance, and/or tolerance for 3 hours of therapy each day at d/c. Based on an AM-PAC score of **/24 and their current ADL deficits; it is recommended that the patient have 3-5 sessions per week of Occupational Therapy at d/c to increase the patient's independence. Based on an AM-PAC score of **/24 and their current ADL deficits; it is recommended that the patient have 2-3 sessions per week of Occupational Therapy at d/c to increase the patient's independence. At this time and based on an AM-PAC score of **/24, no further OT is recommended upon discharge due to (i.e. patient at baseline functional statusetc). Recommend patient returns to prior setting with prior services.

## 2022-11-16 NOTE — PROGRESS NOTES
Patient will be NPO for potential renal biopsy tomorrow in [place]. Performing IR doc will review the patients hx and recent notes in the morning and will then either approve or deny biopsy appointment.

## 2022-11-16 NOTE — PROGRESS NOTES
Cardiology Progress Note        Patient: Melissa Sawant. Sex: male          DOA: 11/12/2022  YOB: 1940      Age:  78 y.o.        LOS:  LOS: 3 days     Patient seen and examined, chart reviewed. Assessment/Plan     Patient Active Problem List   Diagnosis Code    Altered mental status I94.29    Acute metabolic encephalopathy S40.46    Thrombocytopenia (HCC) D69.6    Aortic stenosis I35.0    CKD (chronic kidney disease) N18.9    Acute kidney injury superimposed on chronic kidney disease ([address] N17.9, N18.9    Peripheral edema R60.9    Hypoxia R09.02    NAPOLEON (acute kidney injury) ([address] N17.9       Acute on chronic renal failure   Nonrheumatic aortic stenosis   Bilateral lower extremity leg edema   Abnormal troponin no clear evidence of acute coronary syndrome, could be demand ischemia     Echocardiogram was done in 10/2022 revealed       Left Ventricle: Normal left ventricular systolic function with a visually estimated EF of 60 - 65%. Left ventricle size is normal. Mildly increased wall thickness. Findings consistent with mild concentric hypertrophy. Normal wall motion. Grade I diastolic dysfunction present with normal LV EF. Left Atrium: Left atrium is mildly dilated. Aortic Valve: Tricuspid valve. Mildly calcified cusp. Mild regurgitation. Moderate stenosis of the aortic valve. Tricuspid Valve: Unable to assess RVSP due to inadequate or insignificant tricuspid regurgitation. Interatrial Septum: No interatrial shunt visualized with color Doppler. Suboptimal bubble study. Agitated saline study was negative with and without provocation. Echocardiogram done today revealed       Left Ventricle: Normal left ventricular systolic function with a visually estimated EF of 60 - 65%. Left ventricle size is normal. Increased wall thickness. Mild septal thickening. Normal wall motion. Grade I diastolic dysfunction present with normal LV EF.     Aortic Valve: Mild regurgitation. Mild stenosis of the aortic valve. Mitral Valve: Moderate annular calcification at the anterior and posterior leaflets of the mitral valve. Mild stenosis noted. Tricuspid Valve: Unable to assess RVSP due to inadequate or insignificant tricuspid regurgitation. Plan:     Continue IV Lasix and metolazone. Strict input output. Patient's volume overload with worsening of renal function is out of proportion to the echocardiographic finding normal LVEF and normal RV function with grade I diastolic dysfunction and needs to look into possible nephrotic syndrome  Monitor renal function and electrolytes. Plan discussed with patient and family member at bed side. Case discussed with   Will follow-up            Subjective:    cc: My breathing is little better, leg swelling      REVIEW OF SYSTEMS:     General: No fevers or chills. Cardiovascular: No chest pain,No palpitations, No orthopnea, No PND,  positive leg swelling, No claudication  Pulmonary: No shortness of breath. Gastrointestinal: No nausea, vomiting, bleeding  Neurology: No Dizziness    Objective:      Visit Vitals  /75   Pulse 88   Temp 98.3 °F (36.8 °C)   Resp 18   Ht 6' 1\" (1.854 m)   Wt 127 kg (280 lb)   SpO2 100%   BMI 36.94 kg/m²     Body mass index is 36.94 kg/m². Physical Exam:  General Appearance: Comfortable, not using accessory muscles of respiration. HEENT: EVERT. HEAD: Atraumatic  NECK: No JVD, no thyroidomeglay. CAROTIDS: no bruit  LUNGS: Clear bilaterally. HEART: S1+S2 audible, 3/6 systolic murmur in aortic and tricuspid area, no pericardial rub. ABD: Non-tender, BS Audible    EXT: bilateral lower extremity ++++ edema, bilateral lower extremity skin discoloration, and no cyanosis. MUSCULOSKELETAL: Grossly no joint deformity.   NEUROLOGICAL: AAO times 3, No motor and sensory deficit    Medication:  Current Facility-Administered Medications   Medication Dose Route Frequency spironolactone (ALDACTONE) tablet 100 mg  100 mg Oral DAILY    doxazosin (CARDURA) tablet 4 mg  4 mg Oral QHS    magnesium oxide (MAG-OX) tablet 400 mg  400 mg Oral QHS    ferrous sulfate tablet 325 mg  1 Tablet Oral BID WITH MEALS    cetirizine (ZYRTEC) tablet 10 mg  10 mg Oral QHS    furosemide (LASIX) injection 80 mg  80 mg IntraVENous TID    metOLazone (ZAROXOLYN) tablet 10 mg  10 mg Oral DAILY    insulin lispro (HUMALOG) injection   SubCUTAneous AC&HS    glucose chewable tablet 16 g  4 Tablet Oral PRN    glucagon (GLUCAGEN) injection 1 mg  1 mg IntraMUSCular PRN    dextrose 10% infusion 0-250 mL  0-250 mL IntraVENous PRN    eltrombopag (PROMACTA) tablet 50 mg (Patient Supplied)  50 mg Oral ACB    cefTRIAXone (ROCEPHIN) 1 g in 0.9% sodium chloride (MBP/ADV) 50 mL MBP  1 g IntraVENous Q24H    Lactobacillus Acidoph & [facility]) tablet 2 Tablet  2 Tablet Oral BID               Lab/Data Reviewed:       Recent Labs     11/15/22  0417 11/14/22  0100 11/13/22  0655   WBC 2.7* 2.7* 2.2*   HGB 9.6* 10.0* 10.1*   HCT 27.7* 30.0* 29.5*   PLT 57* 54* 49*       Recent Labs     11/15/22  0417 11/14/22  0100 11/13/22  0655    137 139   K 3.5 3.2* 3.3*    107 106   CO2 22 22 22   * 134* 140*   * 92* 90*   CREA 6.46* 6.09* 5.88*   CA 8.2* 7.8* 8.0*         Signed By: Mo Brewer MD     November 15, 2022

## 2022-11-16 NOTE — PROGRESS NOTES
Problem: Mobility Impaired (Adult and Pediatric)  Goal: *Acute Goals and Plan of Care (Insert Text)  Description: Physical Therapy Goals   Initiated 11/14/2022 and to be accomplished within 7 day(s)  1. Patient will move from supine <> sit with SBA in prep for out of bed activity and change of position. 2.  Patient will perform sit<> stand with SBA with RW in prep for transfers/ambulation. 3.  Patient will ambulate 50 feet with S/RW for improved functional mobility at discharge. 4.  Patient will ascend/descend 1stairs with handrail(s) with minimal assistance/contact guard assist for home re-entry as needed. Note:   PHYSICAL THERAPY TREATMENT    Patient: Denny Villar (78 y.o. male)  Date: 11/15/2022  Diagnosis: Hypoxia [R09.02]  NAPOLEON (acute kidney injury) ([facility] [N17.9]  Aortic stenosis [I35.0]  Peripheral edema [R60.9] NAPOLEON (acute kidney injury) ([facility]  Precautions: Fall    ASSESSMENT:  Pt progressing slowly with mobility. Pt required bed elevated and Min-ModA to stand. Pt amb 30 ft with RW and Klaus for RW management and stability; cues for posture and technique. Pt required extra rest breaks and time for mobility. Pt required Klaus for return to supine. Will continue to progress as pt tolerates. Progression toward goals:   []      Improving appropriately and progressing toward goals  [x]      Improving slowly and progressing toward goals  []      Not making progress toward goals and plan of care will be adjusted     PLAN:  Patient continues to benefit from skilled intervention to address the above impairments. Continue treatment per established plan of care. Discharge Recommendations: Macario Lewis  Further Equipment Recommendations for Discharge:  N/A    [facility]: 13/20    This AMPA score should be considered in conjunction with interdisciplinary team recommendations to determine the most appropriate discharge setting.  Patient's social support, diagnosis, medical stability, and prior level of function should also be taken into consideration. SUBJECTIVE:   Patient stated sure I can try to walk.     OBJECTIVE DATA SUMMARY:   Critical Behavior:  Neurologic State: Alert, Eyes open spontaneously  Orientation Level: Oriented X4, Appropriate for age  Cognition: Follows commands, Appropriate decision making  Safety/Judgement: Awareness of environment, Fall prevention  Functional Mobility Training:  Bed Mobility:  Sit to Supine: Minimum assistance  Transfers:  Sit to Stand: Minimum assistance; Moderate assistance; Additional time (from elevated bed)  Stand to Sit: Minimum assistance  Balance:  Sitting: Intact  Standing: Impaired; With support  Standing - Static: Fair  Standing - Dynamic : Fair  Ambulation/Gait Training:  Distance (ft): 30 Feet (ft)  Assistive Device: Gait belt;Walker, rolling  Ambulation - Level of Assistance: Minimal assistance  Gait Abnormalities: Decreased step clearance  Base of Support: Widened   Speed/Sandra: Slow  Pain:  Pain level pre-treatment: 0/10  Pain level post-treatment: 0/10     Activity Tolerance:   Fair  Please refer to the flowsheet for vital signs taken during this treatment. After treatment:   [] Patient left in no apparent distress sitting up in chair  [x] Patient left in no apparent distress in bed  [x] Call bell left within reach  [x] Nursing notified  [] Caregiver present  [x] Bed alarm activated  [] SCDs applied      COMMUNICATION/EDUCATION:   [x]         Role of Physical Therapy in the acute care setting. [x]         Fall prevention education was provided and the patient/caregiver indicated understanding. [x]         Patient/family have participated as able in working toward goals and plan of care. []         Patient/family agree to work toward stated goals and plan of care. []         Patient understands intent and goals of therapy, but is neutral about his/her participation.   []         Patient is unable to participate in stated goals/plan of care: ongoing with therapy staff.  []         Other:        Billy Thomas   Time Calculation: 39 mins    Ryan Hassan AM-PAC® Basic Mobility Inpatient Short Form (6-Clicks) Version 2    How much HELP from another person does the patient currently need    (If the patient hasn't done an activity recently, how much help from another person do you think he/she would need if he/she tried?)   Total (Total A or Dep)   A Lot  (Mod to Max A)   A Little (Sup or Min A)   None (Mod I to I)   Turning from your back to your side while in a flat bed without using bedrails? [] 1 [] 2 [x] 3 [] 4   2. Moving from lying on your back to sitting on the side of a flat bed without using bedrails? [] 1 [] 2 [x] 3 [] 4   3. Moving to and from a bed to a chair (including a wheelchair)? [] 1 [x] 2 [] 3 [] 4   4. Standing up from a chair using your arms (e.g., wheelchair, or bedside chair)? [] 1 [x] 2 [] 3 [] 4   5. Walking in hospital room? [] 1 [] 2 [x] 3 [] 4   6. Climbing 3-5 steps with a railing?+   [] 1 [] 2 [] 3 [] 4   +If stair climbing cannot be assessed, skip item #6. Sum responses from items 1-5.

## 2022-11-16 NOTE — PROGRESS NOTES
Problem: Risk for Spread of Infection  Goal: Prevent transmission of infectious organism to others  Description: Prevent the transmission of infectious organisms to other patients, staff members, and visitors. Outcome: Progressing Towards Goal     Problem: Patient Education:  Go to Education Activity  Goal: Patient/Family Education  Outcome: Progressing Towards Goal     Problem: Falls - Risk of  Goal: *Absence of Falls  Description: Document Lonnie Morocho Fall Risk and appropriate interventions in the flowsheet.   Outcome: Progressing Towards Goal  Note: Fall Risk Interventions:  Mobility Interventions: Patient to call before getting OOB         Medication Interventions: Teach patient to arise slowly, Patient to call before getting OOB    Elimination Interventions: Call light in reach

## 2022-11-17 ENCOUNTER — HOSPITAL ENCOUNTER (INPATIENT)
Dept: ULTRASOUND IMAGING | Age: 82
Discharge: HOME | DRG: 682 | End: 2022-11-17
Attending: INTERNAL MEDICINE
Payer: MEDICARE

## 2022-11-17 LAB
ALBUMIN FLD-MCNC: <0.6 G/DL
ALBUMIN SERPL-MCNC: 2 G/DL (ref 3.4–5)
ALBUMIN/GLOB SERPL: 0.6 {RATIO} (ref 0.8–1.7)
ALP SERPL-CCNC: 75 U/L (ref 45–117)
ALT SERPL-CCNC: 25 U/L (ref 16–61)
AMYLASE FLD-CCNC: 4 U/L
ANA SER QL: NEGATIVE
ANION GAP SERPL CALC-SCNC: 12 MMOL/L (ref 3–18)
APPEARANCE FLD: CLEAR
AST SERPL-CCNC: 24 U/L (ref 10–38)
BASOPHILS # BLD: 0 K/UL (ref 0–0.1)
BASOPHILS NFR BLD: 0 % (ref 0–2)
BILIRUB SERPL-MCNC: 1.2 MG/DL (ref 0.2–1)
BUN SERPL-MCNC: 108 MG/DL (ref 7–18)
BUN/CREAT SERPL: 16 (ref 12–20)
CALCIUM SERPL-MCNC: 8.3 MG/DL (ref 8.5–10.1)
CHLORIDE SERPL-SCNC: 103 MMOL/L (ref 100–111)
CO2 SERPL-SCNC: 23 MMOL/L (ref 21–32)
COLOR FLD: YELLOW
COPPER SERPL-MCNC: 61 UG/DL (ref 69–132)
CREAT SERPL-MCNC: 6.88 MG/DL (ref 0.6–1.3)
DIFFERENTIAL METHOD BLD: ABNORMAL
EOSINOPHIL # BLD: 0.1 K/UL (ref 0–0.4)
EOSINOPHIL NFR BLD: 2 % (ref 0–5)
EOSINOPHIL NFR FLD MANUAL: 0 %
ERYTHROCYTE [DISTWIDTH] IN BLOOD BY AUTOMATED COUNT: 14.6 % (ref 11.6–14.5)
FERRITIN SERPL-MCNC: 410 NG/ML (ref 8–388)
GLOBULIN SER CALC-MCNC: 3.1 G/DL (ref 2–4)
GLUCOSE BLD STRIP.AUTO-MCNC: 123 MG/DL (ref 70–110)
GLUCOSE BLD STRIP.AUTO-MCNC: 126 MG/DL (ref 70–110)
GLUCOSE BLD STRIP.AUTO-MCNC: 188 MG/DL (ref 70–110)
GLUCOSE BLD STRIP.AUTO-MCNC: 193 MG/DL (ref 70–110)
GLUCOSE SERPL-MCNC: 119 MG/DL (ref 74–99)
HBV SURFACE AB SER QL IA: NEGATIVE
HBV SURFACE AB SERPL IA-ACNC: <3.1 MIU/ML
HBV SURFACE AG SER QL: <0.1 INDEX
HBV SURFACE AG SER QL: <0.1 INDEX
HBV SURFACE AG SER QL: NEGATIVE
HBV SURFACE AG SER QL: NEGATIVE
HCT VFR BLD AUTO: 26.7 % (ref 36–48)
HCV AB SER IA-ACNC: 0.03 INDEX
HCV AB SERPL QL IA: NEGATIVE
HCV COMMENT,HCGAC: NORMAL
HEP BS AB COMMENT,HBSAC: ABNORMAL
HGB BLD-MCNC: 9.2 G/DL (ref 13–16)
IMM GRANULOCYTES # BLD AUTO: 0 K/UL (ref 0–0.04)
IMM GRANULOCYTES NFR BLD AUTO: 0 % (ref 0–0.5)
IRON SATN MFR SERPL: 68 % (ref 20–50)
IRON SERPL-MCNC: 100 UG/DL (ref 50–175)
LYMPHOCYTES # BLD: 0.4 K/UL (ref 0.9–3.6)
LYMPHOCYTES NFR BLD: 17 % (ref 21–52)
LYMPHOCYTES NFR FLD: 19 %
MACROPHAGES NFR FLD: 81 %
MAGNESIUM SERPL-MCNC: 2.3 MG/DL (ref 1.6–2.6)
MCH RBC QN AUTO: 34.6 PG (ref 24–34)
MCHC RBC AUTO-ENTMCNC: 34.5 G/DL (ref 31–37)
MCV RBC AUTO: 100.4 FL (ref 78–100)
MONOCYTES # BLD: 0.3 K/UL (ref 0.05–1.2)
MONOCYTES NFR BLD: 13 % (ref 3–10)
MONOCYTES NFR FLD: 0 %
NEUTROPHILS NFR FLD: 0 %
NEUTS BAND # FLD: 0 %
NEUTS SEG # BLD: 1.7 K/UL (ref 1.8–8)
NEUTS SEG NFR BLD: 67 % (ref 40–73)
NRBC # BLD: 0 K/UL (ref 0–0.01)
NRBC BLD-RTO: 0 PER 100 WBC
NUC CELL # FLD: 95 /CU MM
PLATELET # BLD AUTO: 64 K/UL (ref 135–420)
PMV BLD AUTO: 10.7 FL (ref 9.2–11.8)
POTASSIUM SERPL-SCNC: 3.1 MMOL/L (ref 3.5–5.5)
PROT FLD-MCNC: <2 G/DL
PROT SERPL-MCNC: 5.1 G/DL (ref 6.4–8.2)
RBC # BLD AUTO: 2.66 M/UL (ref 4.35–5.65)
RBC # FLD: <2000 /CU MM
SODIUM SERPL-SCNC: 138 MMOL/L (ref 136–145)
SPECIMEN SOURCE FLD: ABNORMAL
SPECIMEN SOURCE FLD: NORMAL
TIBC SERPL-MCNC: 148 UG/DL (ref 250–450)
WBC # BLD AUTO: 2.6 K/UL (ref 4.6–13.2)
ZINC SERPL-MCNC: 54 UG/DL (ref 44–115)

## 2022-11-17 PROCEDURE — C9399 UNCLASSIFIED DRUGS OR BIOLOG: HCPCS | Performed by: STUDENT IN AN ORGANIZED HEALTH CARE EDUCATION/TRAINING PROGRAM

## 2022-11-17 PROCEDURE — 83735 ASSAY OF MAGNESIUM: CPT

## 2022-11-17 PROCEDURE — 82042 OTHER SOURCE ALBUMIN QUAN EA: CPT

## 2022-11-17 PROCEDURE — 74011250636 HC RX REV CODE- 250/636: Performed by: INTERNAL MEDICINE

## 2022-11-17 PROCEDURE — 85025 COMPLETE CBC W/AUTO DIFF WBC: CPT

## 2022-11-17 PROCEDURE — 84157 ASSAY OF PROTEIN OTHER: CPT

## 2022-11-17 PROCEDURE — 87340 HEPATITIS B SURFACE AG IA: CPT

## 2022-11-17 PROCEDURE — 97110 THERAPEUTIC EXERCISES: CPT

## 2022-11-17 PROCEDURE — 86704 HEP B CORE ANTIBODY TOTAL: CPT

## 2022-11-17 PROCEDURE — 65270000046 HC RM TELEMETRY

## 2022-11-17 PROCEDURE — 74011000250 HC RX REV CODE- 250: Performed by: INTERNAL MEDICINE

## 2022-11-17 PROCEDURE — 36415 COLL VENOUS BLD VENIPUNCTURE: CPT

## 2022-11-17 PROCEDURE — 80053 COMPREHEN METABOLIC PANEL: CPT

## 2022-11-17 PROCEDURE — 82962 GLUCOSE BLOOD TEST: CPT

## 2022-11-17 PROCEDURE — 74011250637 HC RX REV CODE- 250/637: Performed by: HOSPITALIST

## 2022-11-17 PROCEDURE — 74011250636 HC RX REV CODE- 250/636: Performed by: STUDENT IN AN ORGANIZED HEALTH CARE EDUCATION/TRAINING PROGRAM

## 2022-11-17 PROCEDURE — 74011000258 HC RX REV CODE- 258: Performed by: INTERNAL MEDICINE

## 2022-11-17 PROCEDURE — 74011250637 HC RX REV CODE- 250/637: Performed by: INTERNAL MEDICINE

## 2022-11-17 PROCEDURE — 97530 THERAPEUTIC ACTIVITIES: CPT

## 2022-11-17 PROCEDURE — 49083 ABD PARACENTESIS W/IMAGING: CPT

## 2022-11-17 PROCEDURE — 86706 HEP B SURFACE ANTIBODY: CPT

## 2022-11-17 PROCEDURE — 89051 BODY FLUID CELL COUNT: CPT

## 2022-11-17 PROCEDURE — 82150 ASSAY OF AMYLASE: CPT

## 2022-11-17 PROCEDURE — 86708 HEPATITIS A ANTIBODY: CPT

## 2022-11-17 PROCEDURE — 82728 ASSAY OF FERRITIN: CPT

## 2022-11-17 PROCEDURE — 0W9G3ZZ DRAINAGE OF PERITONEAL CAVITY, PERCUTANEOUS APPROACH: ICD-10-PCS | Performed by: RADIOLOGY

## 2022-11-17 PROCEDURE — 82103 ALPHA-1-ANTITRYPSIN TOTAL: CPT

## 2022-11-17 PROCEDURE — 83540 ASSAY OF IRON: CPT

## 2022-11-17 RX ORDER — LIDOCAINE HYDROCHLORIDE 10 MG/ML
10 INJECTION, SOLUTION EPIDURAL; INFILTRATION; INTRACAUDAL; PERINEURAL
Status: COMPLETED | OUTPATIENT
Start: 2022-11-17 | End: 2022-11-17

## 2022-11-17 RX ORDER — POTASSIUM CHLORIDE 20 MEQ/1
20 TABLET, EXTENDED RELEASE ORAL
Status: COMPLETED | OUTPATIENT
Start: 2022-11-17 | End: 2022-11-17

## 2022-11-17 RX ADMIN — FERROUS SULFATE TAB 325 MG (65 MG ELEMENTAL FE) 325 MG: 325 (65 FE) TAB at 08:52

## 2022-11-17 RX ADMIN — ALBUMIN HUMAN 12.5 G: 0.25 SOLUTION INTRAVENOUS at 18:27

## 2022-11-17 RX ADMIN — CETIRIZINE HYDROCHLORIDE 10 MG: 10 TABLET, FILM COATED ORAL at 21:37

## 2022-11-17 RX ADMIN — Medication 400 MG: at 21:37

## 2022-11-17 RX ADMIN — DOXAZOSIN MESYLATE 4 MG: 4 TABLET ORAL at 21:37

## 2022-11-17 RX ADMIN — Medication 2 TABLET: at 08:52

## 2022-11-17 RX ADMIN — LIDOCAINE HYDROCHLORIDE ANHYDROUS 10 ML: 10 INJECTION, SOLUTION INFILTRATION at 09:26

## 2022-11-17 RX ADMIN — POTASSIUM CHLORIDE 20 MEQ: 1500 TABLET, EXTENDED RELEASE ORAL at 15:08

## 2022-11-17 RX ADMIN — Medication 2 TABLET: at 21:37

## 2022-11-17 RX ADMIN — CEFTRIAXONE 1 G: 1 INJECTION, POWDER, FOR SOLUTION INTRAMUSCULAR; INTRAVENOUS at 06:24

## 2022-11-17 RX ADMIN — FERROUS SULFATE TAB 325 MG (65 MG ELEMENTAL FE) 325 MG: 325 (65 FE) TAB at 17:15

## 2022-11-17 NOTE — PROGRESS NOTES
Patient:  Keo Harrington. :  1940  Gender:  male  MRN #:  [id number]    Assessment:     Keo Duran is a 78y.o. year old male with ongoing CKD baseline cr of 2.3 range. Has been admitted with anasarca, fluid weight gain  over 2 weeks  He developed acute renal failure with progressive rise in creatinine of unknown etiology   CT scan of abdomen showed : cirrhotic morphology with moderate ascites   Does not have significant hematuria and proteinuria to suspect GN     USG is negative for obstructive uropathy      Acute renal failure on CKD stage 3G  Thrombocytopenia   Anemia   Cirrhosis of Liver  Ascites     Plan:   - He has robust urine output overnight/creatinine is rising every day   - considering cirrhosis morphology in CT scan and NAPOLEON will hold off diuretics for now  - could consider iv albumin for low albumin   - Still causes of NAPOLEON is not known , renal biopsy is pending for today   - immunological and serological work up   - As of now no urgent indication of dialysis    Less than 2 gram salt   Intake and output   Dose all meds for current EGFR   Anemia workup , add ferritin and iron level              Subjective/Interval Events    Says he is good   Urinating fine   No shortness of breath , no other symptoms   Had paracentesis this morning       Blood pressure 115/70, pulse 94, temperature 98.3 °F (36.8 °C), resp. rate 18, height 6' (1.829 m), weight 132.3 kg (291 lb 11.2 oz), SpO2 100 %. Exam:    Pt awake and alert  Lung: clear to auscultation  Heart: s1s2 regualr no rubs or murmur  Abdomen: soft, non tender, no guarding, normal bowel sounds.   Ext: 2 + edema and skin rash   CNS- Oriented to time , place and person       Recent Labs     22   WBC 2.6*   HCT 26.7*   .4*       Recent Labs     22  1343     --    CO2 23  --    *  --    INR  --  1.3*       Recent Labs     22   AGRAT 0.6*       USG retroperitoneum: - Increased left and right renal cortical echogenicity, suggestive of chronic  medical renal disease. No obstructive uropathy or acute sonographic  abnormality. Mild dependent changes of atelectasis and peribronchial areas of groundglass  density which may reflect a manifestation of small airways disease or  potentially alveolar edema    > No evidence of pleural effusion. 2. Within the abdomen/pelvis:     > Cirrhotic hepatic morphology and signal of portal hypertension with small  moderate amount of ascites.     > No abnormal bowel wall thickening or inflammatory changes. Prior ileocolonic  resection and reanastomosis. CT abdomen/pelvis: -       Medical Decision Making :    I have reviewed patient's record for pertinent labs, radiology and other test . I have reviewed old records. I have ordered labs, medications and radiology as necessary and indicated per patient's condition . Total time spent is approximately 31 minutes. Greater than 50 % of that time was spent in counseling and or care coordination.      Tiffany Girard MD  Nephrology -[facility], [facility].

## 2022-11-17 NOTE — PROGRESS NOTES
Noted therapy recommendation. CM Met with pt/family and explained CM role and to discuss the plan of care. Pt and family are in agreement with therapy recommendations. Offered freedom of choice for SNF/HH. Provided a list of area agencies/facilities to refer to to assist family with making a determiniation. Referrals sent to all [address] [place] [address] at patient request. CMS referral placed.

## 2022-11-17 NOTE — ROUTINE PROCESS
Ultrasound Right Lower Quad Paracentesis was preformed. 3.2 Liters  was drained and  40ccs sent to lab for testing. Patient tolerated procedure with out pain. Patient was handed off to transportation in stable condition.

## 2022-11-17 NOTE — PROGRESS NOTES
Hospitalist Progress Note    Patient: Hayden Hitchcock. MRN: [id number]  CSN: [id number]    YOB: 1940  Age: 78 y.o.   Sex: male    DOA: 11/12/2022 LOS:  LOS: 5 days          Chief Complaint:    ARF      Assessment/Plan        Acute renal failure on chronic kidney disease  Cr worsened  PO Kdur for low K  Nephro following  For biopsy     Generalized anasarca  Ascites  S/p 3200 ml para this am     Severe aortic stenosis  Cardiology notes no notable primary cardiac etiology  Holding ACE and ARB   Last EF was 60 to 65% with grade 1 diastolic dysfunction     Thrombocytopenia, chronic  Mild improvement  Avoid heparin  SCDs for DVT prevention  on his Promacta 50mg daily  Avoid blood thinners     Hypertension  Cardura and metoprolol     History of Crohn's disease      Hyperglycemia -monitor     Empiric rocephin for possible UTI citrobacter sensitive to rocephin     Hypokalemia-PO Kdur     PT consult     Could be moving towards dialysis if renal function does not improve  Renal biopsy hopefully platlets stay above 50   Npo after midnight    D/q pt and wife  Disposition :  Patient Active Problem List   Diagnosis Code    Altered mental status A15.12    Acute metabolic encephalopathy V23.40    Thrombocytopenia (HCC) D69.6    Aortic stenosis I35.0    CKD (chronic kidney disease) N18.9    Acute kidney injury superimposed on chronic kidney disease (HCC) N17.9, N18.9    Peripheral edema R60.9    Hypoxia R09.02    NAPOLEON (acute kidney injury) ([facility] N17.9       Subjective:    Im fine  Abd feels better today  No SOB    Review of systems:    Constitutional: denies fevers, chills  Respiratory: denies SOB, cough  Cardiovascular: denies chest pain, palpitations  Gastrointestinal: denies nausea, vomiting, diarrhea      Vital signs/Intake and Output:  Visit Vitals  /65   Pulse 80   Temp 98.5 °F (36.9 °C)   Resp 18   Ht 6' (1.829 m)   Wt 132.3 kg (291 lb 11.2 oz)   SpO2 100%   BMI 39.56 kg/m²     Current Shift:  No intake/output data recorded. Last three shifts:  11/15 1901 - 11/17 0700  In: -   Out: 8100 [Urine:8100]    Exam:    General: elderly obese WM, alert, NAD, OX3  Head/Neck: NCAT, supple, No masses, No lymphadenopathy  CVS:Regular rate and rhythm, no M/R/G, S1/S2 heard, no thrill  Lungs:Clear to auscultation bilaterally, no wheezes, rhonchi, or rales  Abdomen: Soft, Nontender, mild distention  Extremities:stasis edema 2 plus  Neuro:grossly normal , follows commands  Psych:appropriate                Labs: Results:       Chemistry Recent Labs     11/17/22  0315 11/16/22  0402 11/15/22  0417   * 123* 130*    139 138   K 3.1* 3.6 3.5    104 105   CO2 23 22 22   * 104* 101*   CREA 6.88* 6.54* 6.46*   CA 8.3* 8.1* 8.2*   AGAP 12 13 11   BUCR 16 16 16   AP 75 80 82   TP 5.1* 4.9* 5.3*   ALB 2.0* 2.1* 2.1*   GLOB 3.1 2.8 3.2   AGRAT 0.6* 0.8 0.7*      CBC w/Diff Recent Labs     11/17/22  0315 11/16/22  0402 11/15/22  0417   WBC 2.6* 2.3* 2.7*   RBC 2.66* 2.86* 2.70*   HGB 9.2* 9.9* 9.6*   HCT 26.7* 29.0* 27.7*   PLT 64* 63* 57*   GRANS 67 65 65   LYMPH 17* 20* 20*   EOS 2 2 2      Cardiac Enzymes No results for input(s): CPK, CKND1, CESAR in the last 72 hours. No lab exists for component: CKRMB, TROIP   Coagulation Recent Labs     11/16/22  1343   PTP 16.6*   INR 1.3*       Lipid Panel Lab Results   Component Value Date/Time    Cholesterol, total 102 10/07/2022 03:45 AM    HDL Cholesterol 42 10/07/2022 03:45 AM    LDL, calculated 47.6 10/07/2022 03:45 AM    VLDL, calculated 12.4 10/07/2022 03:45 AM    Triglyceride 62 10/07/2022 03:45 AM    CHOL/HDL Ratio 2.4 10/07/2022 03:45 AM      BNP No results for input(s): BNPP in the last 72 hours.    Liver Enzymes Recent Labs     11/17/22  0315   TP 5.1*   ALB 2.0*   AP 75      Thyroid Studies No results found for: T4, T3U, TSH, TSHEXT     Procedures/imaging: see electronic medical records for all procedures/Xrays and details which were not copied into this note but were reviewed prior to creation of Estefany Parsons MD

## 2022-11-17 NOTE — PROGRESS NOTES
Problem: Mobility Impaired (Adult and Pediatric)  Goal: *Acute Goals and Plan of Care (Insert Text)  Description: Physical Therapy Goals   Initiated 11/14/2022 and to be accomplished within 7 day(s)  1. Patient will move from supine <> sit with SBA in prep for out of bed activity and change of position. 2.  Patient will perform sit<> stand with SBA with RW in prep for transfers/ambulation. 3.  Patient will ambulate 50 feet with S/RW for improved functional mobility at discharge. 4.  Patient will ascend/descend 1stairs with handrail(s) with minimal assistance/contact guard assist for home re-entry as needed. Note:   PHYSICAL THERAPY TREATMENT    Patient: Georgia Schumacher (78 y.o. male)  Date: 11/17/2022  Diagnosis: Hypoxia [R09.02]  NAPOLEON (acute kidney injury) ([facility] [address] [N17.9]  Aortic stenosis [I35.0]  Peripheral edema [R60.9] NAPOLEON (acute kidney injury) ([facility] [address]  Precautions: Fall    ASSESSMENT:  Despite multiple attempts at education and encouragement pt declined ambulation. Pt performed supine there ex for LE strengthening. Will continue to progress mobility as pt tolerates. Chronic L foot drop. Progression toward goals:   []      Improving appropriately and progressing toward goals  [x]      Improving slowly and progressing toward goals  []      Not making progress toward goals and plan of care will be adjusted     PLAN:  Patient continues to benefit from skilled intervention to address the above impairments. Continue treatment per established plan of care. Discharge Recommendations: Skilled Nursing Facility  Further Equipment Recommendations for Discharge:  N/A    AMPAC: 10/10    This AMPAC score should be considered in conjunction with interdisciplinary team recommendations to determine the most appropriate discharge setting. Patient's social support, diagnosis, medical stability, and prior level of function should also be taken into consideration.      SUBJECTIVE:   Patient stated I am Luciano Morales     OBJECTIVE DATA SUMMARY:   Critical Behavior:  Neurologic State: Alert, Eyes open spontaneously  Orientation Level: Oriented X4  Cognition: Appropriate decision making, Follows commands  Safety/Judgement: Fall prevention  Therapeutic Exercises:   Pt performed supine there ex: SLR, hip abd/add, SAQ, QS, heel slides, R ankle DF, x10 reps ea  Pain:  Pain level pre-treatment: 0/10  Pain level post-treatment: 0/10     Activity Tolerance:   Good  Please refer to the flowsheet for vital signs taken during this treatment. After treatment:   [] Patient left in no apparent distress sitting up in chair  [x] Patient left in no apparent distress in bed  [x] Call bell left within reach  [x] Nursing notified  [] Caregiver present  [x] Bed alarm activated  [] SCDs applied      COMMUNICATION/EDUCATION:   [x]         Role of Physical Therapy in the acute care setting. [x]         Fall prevention education was provided and the patient/caregiver indicated understanding. [x]         Patient/family have participated as able in working toward goals and plan of care. []         Patient/family agree to work toward stated goals and plan of care. []         Patient understands intent and goals of therapy, but is neutral about his/her participation. []         Patient is unable to participate in stated goals/plan of care: ongoing with therapy staff.  []         Other:        Aj Thomas   Time Calculation: 24 mins    Andry Davalos AM-PAC® Basic Mobility Inpatient Short Form (6-Clicks) Version 2    How much HELP from another person does the patient currently need    (If the patient hasn't done an activity recently, how much help from another person do you think he/she would need if he/she tried?)   Total (Total A or Dep)   A Lot  (Mod to Max A)   A Little (Sup or Min A)   None (Mod I to I)   Turning from your back to your side while in a flat bed without using bedrails? [] 1 [x] 2 [] 3 [] 4   2.  Moving from lying on your back to sitting on the side of a flat bed without using bedrails? [] 1 [x] 2 [] 3 [] 4   3. Moving to and from a bed to a chair (including a wheelchair)? [] 1 [x] 2 [] 3 [] 4   4. Standing up from a chair using your arms (e.g., wheelchair, or bedside chair)? [] 1 [x] 2 [] 3 [] 4   5. Walking in hospital room? [] 1 [x] 2 [] 3 [] 4   6. Climbing 3-5 steps with a railing?+   [] 1 [] 2 [] 3 [] 4   +If stair climbing cannot be assessed, skip item #6. Sum responses from items 1-5.

## 2022-11-17 NOTE — PROGRESS NOTES
Hospitalist Progress Note    Patient: Tata Ramos. MRN: [id number]  CSN: [id number]    YOB: 1940  Age: 78 y.o. Sex: male    DOA: 11/12/2022 LOS:  LOS: 4 days          Chief Complaint:    ARF      Assessment/Plan     Acute renal failure on chronic kidney disease  Gillis placed  No obst per US  Strict I's and O's  IV Lasix and Zaroxolyn  Nephrology on case  Slow to improve ? Nephrotic syndrome  Will need renal bx planned tomorrow    Generalized anasarca  Check CT  ?  Cirrhosis hepatology consult     Severe aortic stenosis  Trend troponins  Cardiology is consulted  Holding ACE and ARB right now we will defer on echo  Last EF was 60 to 65% with grade 1 diastolic dysfunction     Thrombocytopenia, chronic  Avoid heparin  SCDs for DVT prevention  on his Promacta 50mg daily  Avoid blood thinners       Hypertension  Cardura and metoprolol     History of Crohn's disease   Questran daily for diarrhea  Check ct which shows stool build up  Discussed with patient     Hyperglycemia -monitor     Diarrhea incomplete emptying due to prev surgery  Check stool cultures  Start pro biotics continue Questran     Empiric rocephin for possible UTI citrobacter sensitive to rocephin     Hypokalemia-PO Kdur     Palliative care consult  PT consult    Could be moving towards dialysis if renal function does not improve  Renal biopsy hopefully platlets stay above 50 they will determine tomorrow  Npo after midnight    Signed DNR with palliative     Disposition :  Patient Active Problem List   Diagnosis Code    Altered mental status H18.94    Acute metabolic encephalopathy W80.28    Thrombocytopenia (HCC) D69.6    Aortic stenosis I35.0    CKD (chronic kidney disease) N18.9    Acute kidney injury superimposed on chronic kidney disease (HCC) N17.9, N18.9    Peripheral edema R60.9    Hypoxia R09.02    NAPOLEON (acute kidney injury) ([facility] [address] N17.9       Subjective:    Feeling ok  Resting  No SOB or nausea    Review of systems:    Constitutional: denies fevers, chills  Respiratory: denies SOB,+ wheeze  Cardiovascular: denies chest pain  Gastrointestinal: denies nausea, vomiting, diarrhea      Vital signs/Intake and Output:  Visit Vitals  /63   Pulse 81   Temp 97.7 °F (36.5 °C)   Resp 18   Ht 6' (1.829 m)   Wt 127 kg (280 lb)   SpO2 98%   BMI 37.97 kg/m²     Current Shift:  No intake/output data recorded. Last three shifts:  11/15 0701 - 11/16 1900  In: -   Out: 9779 [Urine:4350]    Exam:    General: obese elderly WM, NAD, resting easily  CVS:Regular rate and rhythm, no M/R/G, S1/S2 heard, no thrill  Lungs:Clear to auscultation bilaterally,+wheezes, rhonchi, or rales  Abdomen: distended ansarca  Extremities: 3 plus edema LE BL  Neuro:grossly normal , follows commands  Psych:appropriate                Labs: Results:       Chemistry Recent Labs     11/16/22  0402 11/15/22  0417 11/14/22  0100   * 130* 134*    138 137   K 3.6 3.5 3.2*    105 107   CO2 22 22 22   * 101* 92*   CREA 6.54* 6.46* 6.09*   CA 8.1* 8.2* 7.8*   AGAP 13 11 8   BUCR 16 16 15   AP 80 82 88   TP 4.9* 5.3* 5.2*   ALB 2.1* 2.1* 2.1*   GLOB 2.8 3.2 3.1   AGRAT 0.8 0.7* 0.7*        CBC w/Diff Recent Labs     11/16/22  0402 11/15/22  0417 11/14/22  0100   WBC 2.3* 2.7* 2.7*   RBC 2.86* 2.70* 2.90*   HGB 9.9* 9.6* 10.0*   HCT 29.0* 27.7* 30.0*   PLT 63* 57* 54*   GRANS 65 65 73   LYMPH 20* 20* 15*   EOS 2 2 2        Cardiac Enzymes No results for input(s): CPK, CKND1, CESAR in the last 72 hours.     No lab exists for component: CKRMB, TROIP   Coagulation Recent Labs     11/16/22  1343   PTP 16.6*   INR 1.3*         Lipid Panel Lab Results   Component Value Date/Time    Cholesterol, total 102 10/07/2022 03:45 AM    HDL Cholesterol 42 10/07/2022 03:45 AM    LDL, calculated 47.6 10/07/2022 03:45 AM    VLDL, calculated 12.4 10/07/2022 03:45 AM    Triglyceride 62 10/07/2022 03:45 AM    CHOL/HDL Ratio 2.4 10/07/2022 03:45 AM      BNP No results for input(s): BNPP in the last 72 hours.    Liver Enzymes Recent Labs     11/16/22  0402   TP 4.9*   ALB 2.1*   AP 80        Thyroid Studies No results found for: T4, T3U, TSH, TSHEXT, TSHEXT     Procedures/imaging: see electronic medical records for all procedures/Xrays and details which were not copied into this note but were reviewed prior to creation of [facility]

## 2022-11-17 NOTE — PROGRESS NOTES
Problem: Risk for Spread of Infection  Goal: Prevent transmission of infectious organism to others  Description: Prevent the transmission of infectious organisms to other patients, staff members, and visitors. Outcome: Progressing Towards Goal     Problem: Patient Education:  Go to Education Activity  Goal: Patient/Family Education  Outcome: Progressing Towards Goal     Problem: Falls - Risk of  Goal: *Absence of Falls  Description: Document Branscomb Fall Risk and appropriate interventions in the flowsheet. Outcome: Progressing Towards Goal  Note: Fall Risk Interventions:  Mobility Interventions: Strengthening exercises (ROM-active/passive), Utilize walker, cane, or other assistive device         Medication Interventions: Teach patient to arise slowly, Patient to call before getting OOB    Elimination Interventions: Call light in reach              Problem: Patient Education: Go to Patient Education Activity  Goal: Patient/Family Education  Outcome: Progressing Towards Goal     Problem: Diabetes Self-Management  Goal: *Disease process and treatment process  Description: Define diabetes and identify own type of diabetes; list 3 options for treating diabetes. Outcome: Progressing Towards Goal  Goal: *Incorporating nutritional management into lifestyle  Description: Describe effect of type, amount and timing of food on blood glucose; list 3 methods for planning meals. Outcome: Progressing Towards Goal  Goal: *Incorporating physical activity into lifestyle  Description: State effect of exercise on blood glucose levels. Outcome: Progressing Towards Goal  Goal: *Developing strategies to promote health/change behavior  Description: Define the ABC's of diabetes; identify appropriate screenings, schedule and personal plan for screenings.   Outcome: Progressing Towards Goal  Goal: *Using medications safely  Description: State effect of diabetes medications on diabetes; name diabetes medication taking, action and side effects. Outcome: Progressing Towards Goal  Goal: *Monitoring blood glucose, interpreting and using results  Description: Identify recommended blood glucose targets  and personal targets. Outcome: Progressing Towards Goal  Goal: *Prevention, detection, treatment of acute complications  Description: List symptoms of hyper- and hypoglycemia; describe how to treat low blood sugar and actions for lowering  high blood glucose level. Outcome: Progressing Towards Goal  Goal: *Prevention, detection and treatment of chronic complications  Description: Define the natural course of diabetes and describe the relationship of blood glucose levels to long term complications of diabetes.   Outcome: Progressing Towards Goal  Goal: *Developing strategies to address psychosocial issues  Description: Describe feelings about living with diabetes; identify support needed and support network  Outcome: Progressing Towards Goal  Goal: *Insulin pump training  Outcome: Progressing Towards Goal  Goal: *Sick day guidelines  Outcome: Progressing Towards Goal  Goal: *Patient Specific Goal (EDIT GOAL, INSERT TEXT)  Outcome: Progressing Towards Goal     Problem: Patient Education: Go to Patient Education Activity  Goal: Patient/Family Education  Outcome: Progressing Towards Goal     Problem: Patient Education: Go to Patient Education Activity  Goal: Patient/Family Education  Outcome: Progressing Towards Goal     Problem: Patient Education: Go to Patient Education Activity  Goal: Patient/Family Education  Outcome: Progressing Towards Goal

## 2022-11-17 NOTE — PROGRESS NOTES
Problem: Mobility Impaired (Adult and Pediatric)  Goal: *Acute Goals and Plan of Care (Insert Text)  Description: Physical Therapy Goals   Initiated 11/14/2022 and to be accomplished within 7 day(s)  1. Patient will move from supine <> sit with SBA in prep for out of bed activity and change of position. 2.  Patient will perform sit<> stand with SBA with RW in prep for transfers/ambulation. 3.  Patient will ambulate 50 feet with S/RW for improved functional mobility at discharge. 4.  Patient will ascend/descend 1stairs with handrail(s) with minimal assistance/contact guard assist for home re-entry as needed. Outcome: Progressing Towards Goal  []  Patient has met MD keyshawn nagy for d/c home   []  Recommend [facility] with 24 hour adult care   []  Benefit from additional acute PT session to address:      PHYSICAL THERAPY TREATMENT    Patient: Arcelia Rosado (78 y.o. male)  Date: 11/16/2022  Diagnosis: Hypoxia [R09.02]  NAPOLEON (acute kidney injury) ([address] [N17.9]  Aortic stenosis [I35.0]  Peripheral edema [R60.9] NAPOLEON (acute kidney injury) ([address]      Precautions: Fall  PLOF: amb with RW indoors and with 3 wheeled walker for outdoors, wheezing since current illness. Lives with wife in [facility] with threshold entry. ASSESSMENT:  Pt initially refused session, but response well to idea of attempting to avoid placement at D/c. Pt attempts to demo independent mobility. But moves slowly and requires railing for transition. Pt provided with min assistance with standing transfers, due to posterior deviation. 3 min needed to establish stability in standing. Single trial of ambulation to entry door and back to bedside. Able to stand at bedside while linens freshened. Pt refused second trial of amb, due to fatigue. He implies that he would be capable of second trip, but would like to resume tomorrow. Pt informed that he will need to increase ind and endurance to increase recommendation to home.  Pt requests to stay sitting EOB for pudding consumption. Ice and diet cola given. D/w RN        Progression toward goals: Fair  []      Improving appropriately and progressing toward goals  [x]      Improving slowly and progressing toward goals  []      Not making progress toward goals and plan of care will be adjusted     PLAN:  Patient continues to benefit from skilled intervention to address the above impairments. Continue treatment per established plan of care. Discharge Recommendations: Macario Lewis vs HHPT  Further Equipment Recommendations for Discharge:  gait belt and rolling walker    AMPAC: 15    This AMPAC score should be considered in conjunction with interdisciplinary team recommendations to determine the most appropriate discharge setting. Patient's social support, diagnosis, medical stability, and prior level of function should also be taken into consideration. SUBJECTIVE:   Patient stated Not today. They have had me everywhere in this hospital today. I'm tired.     OBJECTIVE DATA SUMMARY:   Critical Behavior:  Neurologic State: Alert, Eyes open spontaneously  Orientation Level: Oriented X4  Cognition: Appropriate decision making, Follows commands  Safety/Judgement: Fall prevention  Functional Mobility Training:  Bed Mobility:  Rolling: Stand-by assistance  Supine to Sit: Stand-by assistance  Scooting: Stand-by assistance  Transfers:  Sit to Stand: Minimum assistance  Stand to Sit: Minimum assistance  Balance:  Sitting: Intact  Standing: Impaired; With support  Standing - Static: Fair  Standing - Dynamic : Fair   Ambulation/Gait Training:  Distance (ft): 30 Feet (ft)  Assistive Device: Gait belt;Walker, rolling  Ambulation - Level of Assistance: Contact guard assistance  Gait Abnormalities: Decreased step clearance (Flexed trunk)  Base of Support: Widened  Speed/Sandra: Slow  Pain:  Pain level pre-treatment: 2/10 (Full body stiffness)  Pain level post-treatment: 2/10   Pain Intervention(s): Medication (see MAR); Rest, Ice, Repositioning   Response to intervention: Nurse notified, See doc flow  Activity Tolerance:   Fair  Please refer to the flowsheet for vital signs taken during this treatment. After treatment:   [x] Patient left in no apparent distress sitting EOB  [] Patient left in no apparent distress in bed  [x] Call bell left within reach  [x] Nursing notified  [] Caregiver present  [x] Bed alarm activated  [] SCDs applied      COMMUNICATION/EDUCATION:   [x]         Role of Physical Therapy in the acute care setting. [x]         Fall prevention education was provided and the patient/caregiver indicated understanding. [x]         Patient/family have participated as able in working toward goals and plan of care. [x]         Patient/family agree to work toward stated goals and plan of care. []         Patient understands intent and goals of therapy, but is neutral about his/her participation. []         Patient is unable to participate in stated goals/plan of care: ongoing with therapy staff.  []         Other:        Daphne Bowens PTA   Time Calculation: 29 mins    Shayferoz Jean -PAC® Basic Mobility Inpatient Short Form (6-Clicks) Version 2    How much HELP from another person does the patient currently need    (If the patient hasn't done an activity recently, how much help from another person do you think he/she would need if he/she tried?)   Total (Total A or Dep)   A Lot  (Mod to Max A)   A Little (Sup or Min A)   None (Mod I to I)   Turning from your back to your side while in a flat bed without using bedrails? [] 1 [] 2 [x] 3 [] 4   2. Moving from lying on your back to sitting on the side of a flat bed without using bedrails? [] 1 [] 2 [x] 3 [] 4   3. Moving to and from a bed to a chair (including a wheelchair)? [] 1 [] 2 [x] 3 [] 4   4. Standing up from a chair using your arms (e.g., wheelchair, or bedside chair)? [] 1 [] 2 [x] 3 [] 4   5. Walking in hospital room?    [] 1 [] 2 [x] 3 [] 4   6. Climbing 3-5 steps with a railing?+   [] 1 [] 2 [] 3 [] 4   +If stair climbing cannot be assessed, skip item #6. Sum responses from items 1-5. Based on an AM-PAC score of 15/24 (15/20 if omitting stairs) and their current functional mobility deficits, it is recommended that the patient have 3-5 sessions per week of Physical Therapy at d/c to increase the patient's independence.

## 2022-11-17 NOTE — PROGRESS NOTES
Cardiology Progress Note        Patient: Florentin Diaz. Sex: male          DOA: 11/12/2022  YOB: 1940      Age:  78 y.o.        LOS:  LOS: 4 days     Patient seen and examined, chart reviewed. Assessment/Plan     Patient Active Problem List   Diagnosis Code    Altered mental status M96.00    Acute metabolic encephalopathy D39.87    Thrombocytopenia (HCC) D69.6    Aortic stenosis I35.0    CKD (chronic kidney disease) N18.9    Acute kidney injury superimposed on chronic kidney disease ([address] N17.9, N18.9    Peripheral edema R60.9    Hypoxia R09.02    NAPOLEON (acute kidney injury) ([address] N17.9       Acute on chronic renal failure   Nonrheumatic aortic stenosis   Bilateral lower extremity leg edema   Abnormal troponin no clear evidence of acute coronary syndrome, could be demand ischemia     Echocardiogram was done in 10/2022 revealed       Left Ventricle: Normal left ventricular systolic function with a visually estimated EF of 60 - 65%. Left ventricle size is normal. Mildly increased wall thickness. Findings consistent with mild concentric hypertrophy. Normal wall motion. Grade I diastolic dysfunction present with normal LV EF. Left Atrium: Left atrium is mildly dilated. Aortic Valve: Tricuspid valve. Mildly calcified cusp. Mild regurgitation. Moderate stenosis of the aortic valve. Tricuspid Valve: Unable to assess RVSP due to inadequate or insignificant tricuspid regurgitation. Interatrial Septum: No interatrial shunt visualized with color Doppler. Suboptimal bubble study. Agitated saline study was negative with and without provocation. Echocardiogram done today revealed       Left Ventricle: Normal left ventricular systolic function with a visually estimated EF of 60 - 65%. Left ventricle size is normal. Increased wall thickness. Mild septal thickening. Normal wall motion. Grade I diastolic dysfunction present with normal LV EF.     Aortic Valve: Mild regurgitation. Mild stenosis of the aortic valve. Mitral Valve: Moderate annular calcification at the anterior and posterior leaflets of the mitral valve. Mild stenosis noted. Tricuspid Valve: Unable to assess RVSP due to inadequate or insignificant tricuspid regurgitation. CT chest/abdomen/pelvis reported    1. Within the chest:     > Mild dependent changes of atelectasis and peribronchial areas of groundglass density which may reflect a manifestation of small airways disease or potentially alveolar edema    > No evidence of pleural effusion. 2. Within the abdomen/pelvis:     > Cirrhotic hepatic morphology and signal of portal hypertension with small moderate amount of ascites.     > No abnormal bowel wall thickening or inflammatory changes. Prior ileocolonic resection and reanastomosis. > Cholelithiasis without evidence of cholecystitis.     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     > Extensive body wall edema.     > No evidence of obstructive uropathy. Urinary bladder decompressed with Gillis catheter in situ. Bilateral lower extremity venous duplex reported  No evidence of deep vein thrombosis in the right lower extremity veins assessed. No evidence of deep vein thrombosis in the left lower extremity veins assessed. Plan:     Continue IV Lasix and metolazone. Strict input output. Patient's volume overload with worsening of renal function is out of proportion to the echocardiographic finding normal LVEF and normal RV function with grade I diastolic dysfunction and needs to look into possible nephrotic syndrome  Monitor renal function and electrolytes. Plan discussed with patient and family member at bed side. Case discussed with   Will follow-up            Subjective:    cc:    My breathing is little better, leg swelling      REVIEW OF SYSTEMS:     General: No fevers or chills. Cardiovascular: No chest pain,No palpitations, No orthopnea, No PND,  positive leg swelling, No claudication  Pulmonary: No shortness of breath. Gastrointestinal: No nausea, vomiting, bleeding  Neurology: No Dizziness    Objective:      Visit Vitals  /63   Pulse 81   Temp 97.7 °F (36.5 °C)   Resp 18   Ht 6' (1.829 m)   Wt 127 kg (280 lb)   SpO2 98%   BMI 37.97 kg/m²     Body mass index is 37.97 kg/m². Physical Exam:  General Appearance: Comfortable, not using accessory muscles of respiration. HEENT: EVERT. HEAD: Atraumatic  NECK: No JVD, no thyroidomeglay. CAROTIDS: no bruit  LUNGS: Clear bilaterally. HEART: S1+S2 audible, 3/6 systolic murmur in aortic and tricuspid area, no pericardial rub. ABD: Non-tender, BS Audible    EXT: bilateral lower extremity ++++ edema, bilateral lower extremity skin discoloration, and no cyanosis. MUSCULOSKELETAL: Grossly no joint deformity.   NEUROLOGICAL: AAO times 3, No motor and sensory deficit    Medication:  Current Facility-Administered Medications   Medication Dose Route Frequency    furosemide (LASIX) injection 80 mg  80 mg IntraVENous BID    [START ON 11/17/2022] metOLazone (ZAROXOLYN) tablet 5 mg  5 mg Oral DAILY    [START ON 11/17/2022] desmopressin (DDAVP) 38.12 mcg in 0.9% sodium chloride 50 mL IVPB  0.3 mcg/kg IntraVENous ONCE    [Held by provider] spironolactone (ALDACTONE) tablet 100 mg  100 mg Oral DAILY    doxazosin (CARDURA) tablet 4 mg  4 mg Oral QHS    magnesium oxide (MAG-OX) tablet 400 mg  400 mg Oral QHS    ferrous sulfate tablet 325 mg  1 Tablet Oral BID WITH MEALS    cetirizine (ZYRTEC) tablet 10 mg  10 mg Oral QHS    insulin lispro (HUMALOG) injection   SubCUTAneous AC&HS    glucose chewable tablet 16 g  4 Tablet Oral PRN    glucagon (GLUCAGEN) injection 1 mg  1 mg IntraMUSCular PRN    dextrose 10% infusion 0-250 mL  0-250 mL IntraVENous PRN    eltrombopag (PROMACTA) tablet 50 mg (Patient Supplied)  50 mg Oral ACB cefTRIAXone (ROCEPHIN) 1 g in 0.9% sodium chloride (MBP/ADV) 50 mL MBP  1 g IntraVENous Q24H    Lactobacillus Acidoph & Ewelinagar [facility]) tablet 2 Tablet  2 Tablet Oral BID               Lab/Data Reviewed:       Recent Labs     11/16/22  0402 11/15/22  0417 11/14/22  0100   WBC 2.3* 2.7* 2.7*   HGB 9.9* 9.6* 10.0*   HCT 29.0* 27.7* 30.0*   PLT 63* 57* 54*       Recent Labs     11/16/22  0402 11/15/22  0417 11/14/22  0100    138 137   K 3.6 3.5 3.2*    105 107   CO2 22 22 22   * 130* 134*   * 101* 92*   CREA 6.54* 6.46* 6.09*   CA 8.1* 8.2* 7.8*         Signed By: Cachorro Wheatley MD     November 16, 2022

## 2022-11-17 NOTE — PROGRESS NOTES
Cardiology Progress Note        Patient: Ramez Jensen Sex: male          DOA: 11/12/2022  YOB: 1940      Age:  78 y.o.        LOS:  LOS: 5 days     Patient seen and examined, chart reviewed. Assessment/Plan     Patient Active Problem List   Diagnosis Code    Altered mental status E07.14    Acute metabolic encephalopathy K13.81    Thrombocytopenia (HCC) D69.6    Aortic stenosis I35.0    CKD (chronic kidney disease) N18.9    Acute kidney injury superimposed on chronic kidney disease ([address] N17.9, N18.9    Peripheral edema R60.9    Hypoxia R09.02    NAPOLEON (acute kidney injury) ([address] N17.9       Acute on chronic renal failure   Nonrheumatic aortic stenosis   Bilateral lower extremity leg edema   Abnormal troponin no clear evidence of acute coronary syndrome, could be demand ischemia     Echocardiogram was done in 10/2022 revealed       Left Ventricle: Normal left ventricular systolic function with a visually estimated EF of 60 - 65%. Left ventricle size is normal. Mildly increased wall thickness. Findings consistent with mild concentric hypertrophy. Normal wall motion. Grade I diastolic dysfunction present with normal LV EF. Left Atrium: Left atrium is mildly dilated. Aortic Valve: Tricuspid valve. Mildly calcified cusp. Mild regurgitation. Moderate stenosis of the aortic valve. Tricuspid Valve: Unable to assess RVSP due to inadequate or insignificant tricuspid regurgitation. Interatrial Septum: No interatrial shunt visualized with color Doppler. Suboptimal bubble study. Agitated saline study was negative with and without provocation. Echocardiogram done on 11/16/2022 revealed       Left Ventricle: Normal left ventricular systolic function with a visually estimated EF of 60 - 65%. Left ventricle size is normal. Increased wall thickness. Mild septal thickening. Normal wall motion. Grade I diastolic dysfunction present with normal LV EF.     Aortic Valve: Mild regurgitation. Mild stenosis of the aortic valve. Mitral Valve: Moderate annular calcification at the anterior and posterior leaflets of the mitral valve. Mild stenosis noted. Tricuspid Valve: Unable to assess RVSP due to inadequate or insignificant tricuspid regurgitation. CT chest/abdomen/pelvis reported    1. Within the chest:     > Mild dependent changes of atelectasis and peribronchial areas of groundglass density which may reflect a manifestation of small airways disease or potentially alveolar edema    > No evidence of pleural effusion. 2. Within the abdomen/pelvis:     > Cirrhotic hepatic morphology and signal of portal hypertension with small moderate amount of ascites.     > No abnormal bowel wall thickening or inflammatory changes. Prior ileocolonic resection and reanastomosis. > Cholelithiasis without evidence of cholecystitis.     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     > Extensive body wall edema.     > No evidence of obstructive uropathy. Urinary bladder decompressed with Gillis catheter in situ. Bilateral lower extremity venous duplex reported  No evidence of deep vein thrombosis in the right lower extremity veins assessed. No evidence of deep vein thrombosis in the left lower extremity veins assessed. Plan:     Continue IV Lasix and metolazone. Strict input output. Patient's volume overload with worsening of renal function is out of proportion to the echocardiographic finding normal LVEF and normal RV function with grade I diastolic dysfunction  Monitor renal function and electrolytes. Plan discussed with patient and family member at bed side. Call on-call cardiologist if needed           Subjective:    cc: My breathing is little better, leg swelling      REVIEW OF SYSTEMS:     General: No fevers or chills.   Cardiovascular: No chest pain,No palpitations, No orthopnea, No PND,  positive leg swelling, No claudication  Pulmonary: No shortness of breath. Gastrointestinal: No nausea, vomiting, bleeding  Neurology: No Dizziness    Objective:      Visit Vitals  BP (!) 120/56   Pulse 87   Temp 97.8 °F (36.6 °C)   Resp 18   Ht 6' (1.829 m)   Wt 132.3 kg (291 lb 11.2 oz)   SpO2 100%   BMI 39.56 kg/m²     Body mass index is 39.56 kg/m². Physical Exam:  General Appearance: Comfortable, not using accessory muscles of respiration. HEENT: EVERT. HEAD: Atraumatic  NECK: No JVD, no thyroidomeglay. CAROTIDS: no bruit  LUNGS: Clear bilaterally. HEART: S1+S2 audible, 3/6 systolic murmur in aortic and tricuspid area, no pericardial rub. ABD: Non-tender, BS Audible    EXT: bilateral lower extremity +++ edema, bilateral lower extremity skin discoloration, and no cyanosis. MUSCULOSKELETAL: Grossly no joint deformity.   NEUROLOGICAL: AAO times 3, No motor and sensory deficit    Medication:  Current Facility-Administered Medications   Medication Dose Route Frequency    albumin, human-kjda 25% (ALBUMINEX) intravenous solution 12.5 g  12.5 g IntraVENous Q6H    potassium chloride (K-DUR, KLOR-CON M20) SR tablet 20 mEq  20 mEq Oral NOW    desmopressin (DDAVP) 38.12 mcg in 0.9% sodium chloride 50 mL IVPB  0.3 mcg/kg IntraVENous ONCE    doxazosin (CARDURA) tablet 4 mg  4 mg Oral QHS    magnesium oxide (MAG-OX) tablet 400 mg  400 mg Oral QHS    ferrous sulfate tablet 325 mg  1 Tablet Oral BID WITH MEALS    cetirizine (ZYRTEC) tablet 10 mg  10 mg Oral QHS    insulin lispro (HUMALOG) injection   SubCUTAneous AC&HS    glucose chewable tablet 16 g  4 Tablet Oral PRN    glucagon (GLUCAGEN) injection 1 mg  1 mg IntraMUSCular PRN    dextrose 10% infusion 0-250 mL  0-250 mL IntraVENous PRN    eltrombopag (PROMACTA) tablet 50 mg (Patient Supplied)  50 mg Oral ACB    cefTRIAXone (ROCEPHIN) 1 g in 0.9% sodium chloride (MBP/ADV) 50 mL MBP  1 g IntraVENous Q24H    Lactobacillus Felipe & Shu [facility]) tablet 2 Tablet  2 Tablet Oral BID               Lab/Data Reviewed:       Recent Labs     11/17/22  0315 11/16/22  0402 11/15/22  0417   WBC 2.6* 2.3* 2.7*   HGB 9.2* 9.9* 9.6*   HCT 26.7* 29.0* 27.7*   PLT 64* 63* 57*       Recent Labs     11/17/22  0315 11/16/22  0402 11/15/22  0417    139 138   K 3.1* 3.6 3.5    104 105   CO2 23 22 22   * 123* 130*   * 104* 101*   CREA 6.88* 6.54* 6.46*   CA 8.3* 8.1* 8.2*         Signed By: Diogenes Day MD     November 17, 2022

## 2022-11-17 NOTE — PROCEDURES
POST PARACENTESIS PROCEDURE NOTE:    Procedure: US Guided Paracentesis    Pre-operative Diagnosis: Abdominal distention. Post-operative Diagnosis: same    Indication: Ascites    Procedure Details: Standard aseptic technique. Ultrasound guidance. Right paracolic approach. 1% lidocaine for local anesthesia. 5 [place] Angelica Yueh sheathed needle connected to vacuum bottle. 3200 mL of yellow clear fluid drained. Sample sent to lab. Specimen: Ascites. Complications:  None. EBL: Minimal.               Condition: Stable. Impression:  Successful paracentesis. Plan: Per primary service.        Florinda Solis MD  Vascular & Interventional Radiology    [facility]     11/17/2022

## 2022-11-18 ENCOUNTER — APPOINTMENT (OUTPATIENT)
Dept: CT IMAGING | Age: 82
DRG: 682 | End: 2022-11-18
Attending: STUDENT IN AN ORGANIZED HEALTH CARE EDUCATION/TRAINING PROGRAM
Payer: MEDICARE

## 2022-11-18 LAB
A1AT SERPL-MCNC: 155 MG/DL (ref 101–187)
ANION GAP SERPL CALC-SCNC: 14 MMOL/L (ref 3–18)
BASOPHILS # BLD: 0 K/UL (ref 0–0.1)
BASOPHILS NFR BLD: 1 % (ref 0–2)
BUN SERPL-MCNC: 114 MG/DL (ref 7–18)
BUN/CREAT SERPL: 16 (ref 12–20)
C-ANCA TITR SER IF: NORMAL TITER
C3 SERPL-MCNC: 81 MG/DL (ref 82–167)
C4 SERPL-MCNC: 10 MG/DL (ref 12–38)
CALCIUM SERPL-MCNC: 8.1 MG/DL (ref 8.5–10.1)
CHLORIDE SERPL-SCNC: 102 MMOL/L (ref 100–111)
CO2 SERPL-SCNC: 23 MMOL/L (ref 21–32)
CREAT SERPL-MCNC: 7.05 MG/DL (ref 0.6–1.3)
DIFFERENTIAL METHOD BLD: ABNORMAL
EOSINOPHIL # BLD: 0.1 K/UL (ref 0–0.4)
EOSINOPHIL NFR BLD: 3 % (ref 0–5)
ERYTHROCYTE [DISTWIDTH] IN BLOOD BY AUTOMATED COUNT: 14.4 % (ref 11.6–14.5)
GLUCOSE BLD STRIP.AUTO-MCNC: 129 MG/DL (ref 70–110)
GLUCOSE BLD STRIP.AUTO-MCNC: 133 MG/DL (ref 70–110)
GLUCOSE BLD STRIP.AUTO-MCNC: 206 MG/DL (ref 70–110)
GLUCOSE BLD STRIP.AUTO-MCNC: 242 MG/DL (ref 70–110)
GLUCOSE SERPL-MCNC: 133 MG/DL (ref 74–99)
HAV AB SER QL IA: POSITIVE
HBV CORE AB SERPL QL IA: NEGATIVE
HCT VFR BLD AUTO: 26.8 % (ref 36–48)
HGB BLD-MCNC: 9 G/DL (ref 13–16)
HIV 1+2 AB+HIV1 P24 AG SERPL QL IA: NONREACTIVE
HIV12 RESULT COMMENT, HHIVC: NORMAL
IMM GRANULOCYTES # BLD AUTO: 0 K/UL (ref 0–0.04)
IMM GRANULOCYTES NFR BLD AUTO: 1 % (ref 0–0.5)
LEFT KIDNEY LENGTH: 10.7 CM
LEFT KIDNEY WIDTH: 5.2 CM
LYMPHOCYTES # BLD: 0.3 K/UL (ref 0.9–3.6)
LYMPHOCYTES NFR BLD: 16 % (ref 21–52)
MAGNESIUM SERPL-MCNC: 2.3 MG/DL (ref 1.6–2.6)
MCH RBC QN AUTO: 34.9 PG (ref 24–34)
MCHC RBC AUTO-ENTMCNC: 33.6 G/DL (ref 31–37)
MCV RBC AUTO: 103.9 FL (ref 78–100)
MONOCYTES # BLD: 0.3 K/UL (ref 0.05–1.2)
MONOCYTES NFR BLD: 13 % (ref 3–10)
MYELOPEROXIDASE AB SER IA-ACNC: <0.2 UNITS (ref 0–0.9)
NEUTS SEG # BLD: 1.4 K/UL (ref 1.8–8)
NEUTS SEG NFR BLD: 66 % (ref 40–73)
NRBC # BLD: 0 K/UL (ref 0–0.01)
NRBC BLD-RTO: 0 PER 100 WBC
P-ANCA ATYPICAL TITR SER IF: NORMAL TITER
P-ANCA TITR SER IF: NORMAL TITER
PLATELET # BLD AUTO: 52 K/UL (ref 135–420)
PMV BLD AUTO: 10.9 FL (ref 9.2–11.8)
POTASSIUM SERPL-SCNC: 3.4 MMOL/L (ref 3.5–5.5)
PROTEINASE3 AB SER IA-ACNC: <0.2 UNITS (ref 0–0.9)
RBC # BLD AUTO: 2.58 M/UL (ref 4.35–5.65)
RIGHT KIDNEY LENGTH: 10.4 CM
RIGHT KIDNEY WIDTH: 4.2 CM
RIGHT RENAL DIST DIAS: 13 CM/S
RIGHT RENAL DIST RI: 0.79
RIGHT RENAL DIST SYS: 61 CM/S
RIGHT RENAL MID DIAS: 14.9 CM/S
RIGHT RENAL MID RI: 0.79
RIGHT RENAL MID SYS: 71 CM/S
SODIUM SERPL-SCNC: 139 MMOL/L (ref 136–145)
WBC # BLD AUTO: 2.1 K/UL (ref 4.6–13.2)

## 2022-11-18 PROCEDURE — 74011250636 HC RX REV CODE- 250/636: Performed by: STUDENT IN AN ORGANIZED HEALTH CARE EDUCATION/TRAINING PROGRAM

## 2022-11-18 PROCEDURE — 74011250636 HC RX REV CODE- 250/636: Performed by: RADIOLOGY

## 2022-11-18 PROCEDURE — 74011250636 HC RX REV CODE- 250/636: Performed by: INTERNAL MEDICINE

## 2022-11-18 PROCEDURE — 83735 ASSAY OF MAGNESIUM: CPT

## 2022-11-18 PROCEDURE — 74011000250 HC RX REV CODE- 250: Performed by: RADIOLOGY

## 2022-11-18 PROCEDURE — 74011000258 HC RX REV CODE- 258: Performed by: STUDENT IN AN ORGANIZED HEALTH CARE EDUCATION/TRAINING PROGRAM

## 2022-11-18 PROCEDURE — 88348 ELECTRON MICROSCOPY DX: CPT

## 2022-11-18 PROCEDURE — C9399 UNCLASSIFIED DRUGS OR BIOLOG: HCPCS | Performed by: STUDENT IN AN ORGANIZED HEALTH CARE EDUCATION/TRAINING PROGRAM

## 2022-11-18 PROCEDURE — 97530 THERAPEUTIC ACTIVITIES: CPT

## 2022-11-18 PROCEDURE — 74011250637 HC RX REV CODE- 250/637: Performed by: INTERNAL MEDICINE

## 2022-11-18 PROCEDURE — 88305 TISSUE EXAM BY PATHOLOGIST: CPT

## 2022-11-18 PROCEDURE — 36415 COLL VENOUS BLD VENIPUNCTURE: CPT

## 2022-11-18 PROCEDURE — 74011000250 HC RX REV CODE- 250

## 2022-11-18 PROCEDURE — 88313 SPECIAL STAINS GROUP 2: CPT

## 2022-11-18 PROCEDURE — 80048 BASIC METABOLIC PNL TOTAL CA: CPT

## 2022-11-18 PROCEDURE — 88350 IMFLUOR EA ADDL 1ANTB STN PX: CPT

## 2022-11-18 PROCEDURE — 97116 GAIT TRAINING THERAPY: CPT

## 2022-11-18 PROCEDURE — 77030003669 CT BX RENAL

## 2022-11-18 PROCEDURE — C1889 IMPLANT/INSERT DEVICE, NOC: HCPCS

## 2022-11-18 PROCEDURE — 65270000046 HC RM TELEMETRY

## 2022-11-18 PROCEDURE — 88346 IMFLUOR 1ST 1ANTB STAIN PX: CPT

## 2022-11-18 PROCEDURE — 74011000258 HC RX REV CODE- 258: Performed by: INTERNAL MEDICINE

## 2022-11-18 PROCEDURE — 82962 GLUCOSE BLOOD TEST: CPT

## 2022-11-18 PROCEDURE — 0TB13ZX EXCISION OF LEFT KIDNEY, PERCUTANEOUS APPROACH, DIAGNOSTIC: ICD-10-PCS | Performed by: RADIOLOGY

## 2022-11-18 PROCEDURE — 85025 COMPLETE CBC W/AUTO DIFF WBC: CPT

## 2022-11-18 PROCEDURE — 97535 SELF CARE MNGMENT TRAINING: CPT

## 2022-11-18 RX ORDER — FENTANYL CITRATE 50 UG/ML
25-100 INJECTION, SOLUTION INTRAMUSCULAR; INTRAVENOUS
Status: DISCONTINUED | OUTPATIENT
Start: 2022-11-18 | End: 2022-11-18

## 2022-11-18 RX ORDER — FLUMAZENIL 0.1 MG/ML
0.2 INJECTION INTRAVENOUS
Status: DISCONTINUED | OUTPATIENT
Start: 2022-11-18 | End: 2022-11-18

## 2022-11-18 RX ORDER — SODIUM CHLORIDE 0.9 % (FLUSH) 0.9 %
5-40 SYRINGE (ML) INJECTION EVERY 8 HOURS
Status: DISCONTINUED | OUTPATIENT
Start: 2022-11-18 | End: 2022-11-23 | Stop reason: SDUPTHER

## 2022-11-18 RX ORDER — SODIUM CHLORIDE 0.9 % (FLUSH) 0.9 %
5-40 SYRINGE (ML) INJECTION AS NEEDED
Status: DISCONTINUED | OUTPATIENT
Start: 2022-11-18 | End: 2022-11-27 | Stop reason: HOSPADM

## 2022-11-18 RX ORDER — NALOXONE HYDROCHLORIDE 0.4 MG/ML
0.1 INJECTION, SOLUTION INTRAMUSCULAR; INTRAVENOUS; SUBCUTANEOUS
Status: DISCONTINUED | OUTPATIENT
Start: 2022-11-18 | End: 2022-11-18

## 2022-11-18 RX ORDER — LIDOCAINE HYDROCHLORIDE 10 MG/ML
1-5 INJECTION, SOLUTION EPIDURAL; INFILTRATION; INTRACAUDAL; PERINEURAL
Status: COMPLETED | OUTPATIENT
Start: 2022-11-18 | End: 2022-11-18

## 2022-11-18 RX ORDER — MIDAZOLAM HYDROCHLORIDE 1 MG/ML
.5-2 INJECTION, SOLUTION INTRAMUSCULAR; INTRAVENOUS
Status: DISCONTINUED | OUTPATIENT
Start: 2022-11-18 | End: 2022-11-18

## 2022-11-18 RX ORDER — LIDOCAINE HYDROCHLORIDE 10 MG/ML
INJECTION, SOLUTION EPIDURAL; INFILTRATION; INTRACAUDAL; PERINEURAL
Status: COMPLETED
Start: 2022-11-18 | End: 2022-11-18

## 2022-11-18 RX ADMIN — ALBUMIN HUMAN 12.5 G: 0.25 SOLUTION INTRAVENOUS at 00:47

## 2022-11-18 RX ADMIN — FENTANYL CITRATE 25 MCG: 50 INJECTION, SOLUTION INTRAMUSCULAR; INTRAVENOUS at 10:44

## 2022-11-18 RX ADMIN — FERROUS SULFATE TAB 325 MG (65 MG ELEMENTAL FE) 325 MG: 325 (65 FE) TAB at 17:13

## 2022-11-18 RX ADMIN — MIDAZOLAM 1 MG: 1 INJECTION INTRAMUSCULAR; INTRAVENOUS at 10:44

## 2022-11-18 RX ADMIN — SODIUM CHLORIDE, PRESERVATIVE FREE 10 ML: 5 INJECTION INTRAVENOUS at 22:00

## 2022-11-18 RX ADMIN — Medication 2 TABLET: at 09:10

## 2022-11-18 RX ADMIN — CETIRIZINE HYDROCHLORIDE 10 MG: 10 TABLET, FILM COATED ORAL at 22:57

## 2022-11-18 RX ADMIN — LIDOCAINE HYDROCHLORIDE 8 ML: 10 INJECTION, SOLUTION EPIDURAL; INFILTRATION; INTRACAUDAL; PERINEURAL at 11:04

## 2022-11-18 RX ADMIN — CEFTRIAXONE 1 G: 1 INJECTION, POWDER, FOR SOLUTION INTRAMUSCULAR; INTRAVENOUS at 06:22

## 2022-11-18 RX ADMIN — Medication 400 MG: at 22:57

## 2022-11-18 RX ADMIN — ALBUMIN HUMAN 12.5 G: 0.25 SOLUTION INTRAVENOUS at 05:16

## 2022-11-18 RX ADMIN — DOXAZOSIN MESYLATE 4 MG: 4 TABLET ORAL at 22:00

## 2022-11-18 RX ADMIN — DESMOPRESSIN ACETATE 36.96 MCG: 4 SOLUTION INTRAVENOUS at 09:31

## 2022-11-18 RX ADMIN — FERROUS SULFATE TAB 325 MG (65 MG ELEMENTAL FE) 325 MG: 325 (65 FE) TAB at 09:10

## 2022-11-18 RX ADMIN — Medication 2 TABLET: at 22:57

## 2022-11-18 RX ADMIN — ALBUMIN HUMAN 12.5 G: 0.25 SOLUTION INTRAVENOUS at 13:23

## 2022-11-18 RX ADMIN — ALBUMIN HUMAN 12.5 G: 0.25 SOLUTION INTRAVENOUS at 17:13

## 2022-11-18 NOTE — PROGRESS NOTES
VASCULAR & INTERVENTIONAL RADIOLOGY PROGRESS NOTE    Ready to proceed with image guided native renal bx and moderate sedation.  DDAVP given this AM.     Angela Willard MD  Vascular & Interventional Radiology  [facility]  11/18/2022

## 2022-11-18 NOTE — PROGRESS NOTES
Problem: Risk for Spread of Infection  Goal: Prevent transmission of infectious organism to others  Description: Prevent the transmission of infectious organisms to other patients, staff members, and visitors. Outcome: Progressing Towards Goal     Problem: Patient Education:  Go to Education Activity  Goal: Patient/Family Education  Outcome: Progressing Towards Goal     Problem: Falls - Risk of  Goal: *Absence of Falls  Description: Document Vonnie Simmons Fall Risk and appropriate interventions in the flowsheet. Outcome: Progressing Towards Goal  Note: Fall Risk Interventions:  Mobility Interventions: Utilize walker, cane, or other assistive device         Medication Interventions: Evaluate medications/consider consulting pharmacy, Patient to call before getting OOB, Teach patient to arise slowly    Elimination Interventions: Call light in reach, Toileting schedule/hourly rounds              Problem: Patient Education: Go to Patient Education Activity  Goal: Patient/Family Education  Outcome: Progressing Towards Goal     Problem: Diabetes Self-Management  Goal: *Disease process and treatment process  Description: Define diabetes and identify own type of diabetes; list 3 options for treating diabetes. Outcome: Progressing Towards Goal  Goal: *Incorporating nutritional management into lifestyle  Description: Describe effect of type, amount and timing of food on blood glucose; list 3 methods for planning meals. Outcome: Progressing Towards Goal  Goal: *Incorporating physical activity into lifestyle  Description: State effect of exercise on blood glucose levels. Outcome: Progressing Towards Goal  Goal: *Developing strategies to promote health/change behavior  Description: Define the ABC's of diabetes; identify appropriate screenings, schedule and personal plan for screenings.   Outcome: Progressing Towards Goal  Goal: *Using medications safely  Description: State effect of diabetes medications on diabetes; name diabetes medication taking, action and side effects. Outcome: Progressing Towards Goal  Goal: *Monitoring blood glucose, interpreting and using results  Description: Identify recommended blood glucose targets  and personal targets. Outcome: Progressing Towards Goal  Goal: *Prevention, detection, treatment of acute complications  Description: List symptoms of hyper- and hypoglycemia; describe how to treat low blood sugar and actions for lowering  high blood glucose level. Outcome: Progressing Towards Goal  Goal: *Prevention, detection and treatment of chronic complications  Description: Define the natural course of diabetes and describe the relationship of blood glucose levels to long term complications of diabetes.   Outcome: Progressing Towards Goal  Goal: *Developing strategies to address psychosocial issues  Description: Describe feelings about living with diabetes; identify support needed and support network  Outcome: Progressing Towards Goal  Goal: *Insulin pump training  Outcome: Progressing Towards Goal  Goal: *Sick day guidelines  Outcome: Progressing Towards Goal  Goal: *Patient Specific Goal (EDIT GOAL, INSERT TEXT)  Outcome: Progressing Towards Goal     Problem: Patient Education: Go to Patient Education Activity  Goal: Patient/Family Education  Outcome: Progressing Towards Goal     Problem: Patient Education: Go to Patient Education Activity  Goal: Patient/Family Education  Outcome: Progressing Towards Goal     Problem: Patient Education: Go to Patient Education Activity  Goal: Patient/Family Education  Outcome: Progressing Towards Goal     Problem: Patient Education: Go to Patient Education Activity  Goal: Patient/Family Education  Outcome: Progressing Towards Goal

## 2022-11-18 NOTE — PROGRESS NOTES
Patient educated on procedure and moderate sedation. Time for questions provided. NPO status verified. Anticoagulations medications verified with patient.

## 2022-11-18 NOTE — PROGRESS NOTES
Problem: Mobility Impaired (Adult and Pediatric)  Goal: *Acute Goals and Plan of Care (Insert Text)  Description: Physical Therapy Goals   Initiated 11/14/2022 and to be accomplished within 7 day(s)  1. Patient will move from supine <> sit with SBA in prep for out of bed activity and change of position. 2.  Patient will perform sit<> stand with SBA with RW in prep for transfers/ambulation. 3.  Patient will ambulate 50 feet with S/RW for improved functional mobility at discharge. 4.  Patient will ascend/descend 1stairs with handrail(s) with minimal assistance/contact guard assist for home re-entry as needed. Outcome: Progressing Towards Goal  []  Patient has met MD keyshawn nagy for d/c home   []  Recommend [facility] with 24 hour adult care   []  Benefit from additional acute PT session to address:      PHYSICAL THERAPY TREATMENT    Patient: Georgia Schumacher (78 y.o. male)  Date: 11/18/2022  Diagnosis: Hypoxia [R09.02]  NAPOLEON (acute kidney injury) ([address] [N17.9]  Aortic stenosis [I35.0]  Peripheral edema [R60.9] NAPOLEON (acute kidney injury) ([address]      Precautions: Fall    ASSESSMENT:  Pt found on toilet, with wife at side. RN reports feeling uncomfortable to pt's transfer to sitting. Continue cues required for hand placement and trunk positioning. OT assisted with shawanda-care (total), prior to amb trial. Longest amb distance since admission, with 3 standing rest breaks. Mili Colon further adjusted for pt's height and retested. Assist suggested for any OOB. Progression toward goals: Fair  [x]      Improving appropriately and progressing toward goals  []      Improving slowly and progressing toward goals  []      Not making progress toward goals and plan of care will be adjusted     PLAN:  Patient continues to benefit from skilled intervention to address the above impairments. Continue treatment per established plan of care.   Discharge Recommendations: Macario Lewis vs [facility]  Further Equipment Recommendations for Discharge:  gait belt and rolling walker    AMPAC: 13    This AMPAC score should be considered in conjunction with interdisciplinary team recommendations to determine the most appropriate discharge setting. Patient's social support, diagnosis, medical stability, and prior level of function should also be taken into consideration. SUBJECTIVE:   Patient stated I'm not in any pain.     OBJECTIVE DATA SUMMARY:   Critical Behavior:  Neurologic State: Alert  Orientation Level: Oriented X4  Cognition: Appropriate decision making, Appropriate for age attention/concentration, Appropriate safety awareness  Safety/Judgement: Fall prevention  Functional Mobility Training:  Transfers:  Sit to Stand: Contact guard assistance;Minimum assistance  Stand to Sit: Contact guard assistance;Minimum assistance  Balance:  Sitting: Intact  Standing: Impaired; With support  Standing - Static: Fair  Standing - Dynamic : Fair  Ambulation/Gait Training:  Distance (ft): 110 Feet (ft)  Assistive Device: Gait belt;Walker, rolling  Ambulation - Level of Assistance: Contact guard assistance  Gait Abnormalities: Decreased step clearance  Base of Support: Widened  Speed/Sandra: Slow  Pain:  Pain level pre-treatment: 0/10  Pain level post-treatment: 0/10   Pain Intervention(s): Medication (see MAR); Rest, Ice, Repositioning   Response to intervention: Nurse notified, See doc flow    Activity Tolerance:   Fair  Please refer to the flowsheet for vital signs taken during this treatment. After treatment:   [x] Patient left in no apparent distress sitting EOB  [] Patient left in no apparent distress in bed  [x] Call bell left within reach  [x] Nursing notified  [] Caregiver present  [] Bed alarm activated  [] SCDs applied      COMMUNICATION/EDUCATION:   [x]         Role of Physical Therapy in the acute care setting. [x]         Fall prevention education was provided and the patient/caregiver indicated understanding.   [x] Patient/family have participated as able in working toward goals and plan of care. [x]         Patient/family agree to work toward stated goals and plan of care. []         Patient understands intent and goals of therapy, but is neutral about his/her participation. []         Patient is unable to participate in stated goals/plan of care: ongoing with therapy staff.  []         Other:        Emiliana Grayson PTA   Time Calculation: 25 mins    [facility] AM-PAC® Basic Mobility Inpatient Short Form (6-Clicks) Version 2    How much HELP from another person does the patient currently need    (If the patient hasn't done an activity recently, how much help from another person do you think he/she would need if he/she tried?)   Total (Total A or Dep)   A Lot  (Mod to Max A)   A Little (Sup or Min A)   None (Mod I to I)   Turning from your back to your side while in a flat bed without using bedrails? [] 1 [] 2 [x] 3 [] 4   2. Moving from lying on your back to sitting on the side of a flat bed without using bedrails? [] 1 [] 2 [x] 3 [] 4   3. Moving to and from a bed to a chair (including a wheelchair)? [] 1 [x] 2 [] 3 [] 4   4. Standing up from a chair using your arms (e.g., wheelchair, or bedside chair)? [] 1 [x] 2 [] 3 [] 4   5. Walking in hospital room? [] 1 [] 2 [x] 3 [] 4   6. Climbing 3-5 steps with a railing?+   [] 1 [] 2 [] 3 [] 4   +If stair climbing cannot be assessed, skip item #6. Sum responses from items 1-5. Based on an AM-PAC score of 13/24 (13/20 if omitting stairs) and their current functional mobility deficits, it is recommended that the patient have 3-5 sessions per week of Physical Therapy at d/c to increase the patient's independence.

## 2022-11-18 NOTE — PROGRESS NOTES
Comprehensive Nutrition Assessment    Type and Reason for Visit: Initial, RD nutrition re-screen/LOS    Nutrition Recommendations/Plan:   Advance diet per MD when feasible to meet nutritional needs. Avoid prolong NPO status     Malnutrition Assessment:  Malnutrition Status: At risk for malnutrition (specify) (11/18/22 1153)      Nutrition Assessment:    H/o crohn's disease. Admitted with acute renal failure on CKD, generalized anasarca, ascites s/p paracentesis removed 3.2L, severe aortic stenosis, thrombocytopenia. Weight hx: 271lb (10/6/22), 250lb (9/6/22), 250lb (2/2020); no weight lost. NPO 11/18 - kidney biopsy. Nutrition Related Findings:    K 3.4, , Cr 7.05, GFR 7. Ferrous sulfate, humalog, florastor, mag ox. Current Nutrition Intake & Therapies:  Average Meal Intake: NPO     DIET NPO Sips of Water with Meds    Anthropometric Measures:  Height: 6' (182.9 cm)  Ideal Body Weight (IBW): 178 lbs (81 kg)     Current Body Wt:  123.2 kg (271 lb 9.7 oz) (11/18/22), 152.6 % IBW. Current BMI (kg/m2): 36.8  Usual Body Weight: 122.9 kg (271 lb) (10/2022)  % Weight Change (Calculated): 0.2  Weight Adjustment: No adjustment                 BMI Category: Obese class 2 (BMI 35.0-39. 9)    Estimated Daily Nutrient Needs:  Energy Requirements Based On: Formula  Weight Used for Energy Requirements: Current  Energy (kcal/day): 9497  Weight Used for Protein Requirements: Current (0.8g r/t renal function)  Protein (g/day): 99  Method Used for Fluid Requirements: 1 ml/kcal  Fluid (ml/day): 2353    Nutrition Diagnosis:   Inadequate oral intake related to acute injury/trauma as evidenced by NPO or clear liquid status due to medical condition    Nutrition Interventions:   Food and/or Nutrient Delivery: Start oral diet  Nutrition Education/Counseling: No recommendations at this time  Coordination of Nutrition Care: Continue to monitor while inpatient       Goals:     Goals: Initiate PO diet, by next RD assessment Nutrition Monitoring and Evaluation:   Behavioral-Environmental Outcomes: None identified  Food/Nutrient Intake Outcomes: Diet advancement/tolerance  Physical Signs/Symptoms Outcomes: Biochemical data, Skin, Weight, GI status, Nutrition focused physical findings    Discharge Planning:    No discharge needs at this time    Nick Ruelas RD

## 2022-11-18 NOTE — PROGRESS NOTES
TRANSFER - OUT REPORT:    Verbal report given to maninder rn(name) on [facility].  being transferred to Critical access hospital(unit) for routine progression of care       Report consisted of patients Situation, Background, Assessment and   Recommendations(SBAR). Information from the following report(s) SBAR, Procedure Summary, Intake/Output and MAR was reviewed with the receiving nurse. Lines:   Peripheral IV 11/12/22 Right Antecubital ([employer])   Site Assessment Clean, dry, & intact 11/17/22 2037   Phlebitis Assessment 0 11/17/22 2037   Infiltration Assessment 0 11/17/22 2037   Dressing Status Clean, dry, & intact 11/17/22 2037   Dressing Type Transparent 11/17/22 2037   Hub Color/Line Status Pink;Patent; Flushed 11/17/22 2037   Action Taken Open ports on tubing capped 11/17/22 2037   Alcohol Cap Used Yes 11/17/22 2037        Opportunity for questions and clarification was provided.       Patient transported with:   [employer]

## 2022-11-18 NOTE — PROCEDURES
Vascular & Interventional Radiology Brief Procedure Note    Interventional Radiologist: Ariel Dunn MD    Assistants: None    Pre-operative Diagnosis:  ARF on CKD    Post-operative Diagnosis: Same as pre-op dx    Procedure(s) Performed:  Left native kidney CT guided medical renal biopsy    Anesthesia:  Local and Moderate Sedation    Findings:  Needle in lowerpole cortex left native kidney    Complications: None    Estimated Blood Loss:  minimal    Tubes, Implants and Drains: 5mm gelfoam torpedoes in needle tract x 2    Specimens: 2 - 16 G cores    Condition: Good    Disposition:  Nursing unit    Plan: Bedrest x 4 hrs, hematuria would not be unexpected today.       Ariel Dunn MD  [facility]  Vascular & Interventional Radiology  11/18/2022

## 2022-11-18 NOTE — PROGRESS NOTES
Hospitalist Progress Note    Patient: Columba Gonsalez MRN: [id number]  CSN: [id number]    YOB: 1940  Age: 78 y.o. Sex: male    DOA: 11/12/2022 LOS:  LOS: 6 days          Chief Complaint:    ARF      Assessment/Plan        Acute renal failure on chronic kidney disease  Cr worsened  On DDAVP  Nephro following  For biopsy today     Generalized anasarca  Ascites  S/p 3200 ml para      Severe aortic stenosis  Cardiology notes no notable primary cardiac etiology  Holding ACE and ARB   Last EF was 60 to 65% with grade 1 diastolic dysfunction     Thrombocytopenia, chronic  stable  Avoid heparin  SCDs for DVT prevention  on his Promacta 50mg daily  Avoid blood thinners     Hypertension  Cardura and metoprolol     History of Crohn's disease      Hyperglycemia -monitor     Empiric rocephin for possible UTI citrobacter sensitive to rocephin        PT consult     Could be moving towards dialysis if renal function does not improve       Disposition :  Patient Active Problem List   Diagnosis Code    Altered mental status L88.86    Acute metabolic encephalopathy S54.02    Thrombocytopenia (HCC) D69.6    Aortic stenosis I35.0    CKD (chronic kidney disease) N18.9    Acute kidney injury superimposed on chronic kidney disease (HCC) N17.9, N18.9    Peripheral edema R60.9    Hypoxia R09.02    NAPOLEON (acute kidney injury) ([facility] [address] N17.9       Subjective:     Im ok  Denies feeling chills or having pain  Prepared for biopsy today    Review of systems:    Constitutional: denies fevers, chills  Respiratory: denies SOB  Cardiovascular: denies chest pain  Gastrointestinal: denies nausea, vomiting, diarrhea      Vital signs/Intake and Output:  Visit Vitals  BP (!) 104/59 (BP 1 Location: Right upper arm, BP Patient Position: At rest)   Pulse 83   Temp 97.5 °F (36.4 °C)   Resp 17   Ht 6' (1.829 m)   Wt 123.2 kg (271 lb 8 oz)   SpO2 100%   BMI 36.82 kg/m²     Current Shift:  11/18 0701 - 11/18 1900  In: -   Out: 750 [Urine:750]  Last three shifts:  11/16 1901 - 11/18 0700  In: 240 [P.O.:240]  Out: 6200 [Urine:6200]    Exam:    General: obese elderly wm, alert, NAD, OX3  CVS:Regular rate and rhythm, no M/R/G, S1/S2 heard, no thrill  Lungs:Clear to auscultation bilaterally, no wheezes, rhonchi, or rales  Abdomen: Soft, Nontender, No distention, Normal Bowel sounds, No hepatomegaly  Extremities: 2 plus leg edema  Neuro:grossly normal , follows commands  Psych:appropriate                Labs: Results:       Chemistry Recent Labs     11/18/22  0445 11/17/22 0315 11/16/22  0402   * 119* 123*    138 139   K 3.4* 3.1* 3.6    103 104   CO2 23 23 22   * 108* 104*   CREA 7.05* 6.88* 6.54*   CA 8.1* 8.3* 8.1*   AGAP 14 12 13   BUCR 16 16 16   AP  --  75 80   TP  --  5.1* 4.9*   ALB  --  2.0* 2.1*   GLOB  --  3.1 2.8   AGRAT  --  0.6* 0.8      CBC w/Diff Recent Labs     11/18/22  0912 11/17/22 0315 11/16/22  0402   WBC 2.1* 2.6* 2.3*   RBC 2.58* 2.66* 2.86*   HGB 9.0* 9.2* 9.9*   HCT 26.8* 26.7* 29.0*   PLT 52* 64* 63*   GRANS 66 67 65   LYMPH 16* 17* 20*   EOS 3 2 2      Cardiac Enzymes No results for input(s): CPK, CKND1, CESAR in the last 72 hours. No lab exists for component: CKRMB, TROIP   Coagulation Recent Labs     11/16/22  1343   PTP 16.6*   INR 1.3*       Lipid Panel Lab Results   Component Value Date/Time    Cholesterol, total 102 10/07/2022 03:45 AM    HDL Cholesterol 42 10/07/2022 03:45 AM    LDL, calculated 47.6 10/07/2022 03:45 AM    VLDL, calculated 12.4 10/07/2022 03:45 AM    Triglyceride 62 10/07/2022 03:45 AM    CHOL/HDL Ratio 2.4 10/07/2022 03:45 AM      BNP No results for input(s): BNPP in the last 72 hours.    Liver Enzymes Recent Labs     11/17/22  0315   TP 5.1*   ALB 2.0*   AP 75      Thyroid Studies No results found for: T4, T3U, TSH, TSHEXT, TSHEXT     Procedures/imaging: see electronic medical records for all procedures/Xrays and details which were not copied into this note but were reviewed prior to creation of [place], MD

## 2022-11-18 NOTE — PROGRESS NOTES
D/C Plan: SNF for rehab when medically appropriate    Per physician patient will remain through the weekend. CM notes possible kidney biopsy today. CMS referal has been placed for SNF placement, no accepting facilities at this time. CM to continue to monitor and watch for notes to assist with identifying any dscharge planning needs. Care Management Interventions  PCP Verified by CM:  Yes  Mode of Transport at Discharge: Self  Transition of Care Consult (CM Consult): Discharge Planning  Support Systems: Spouse/Significant Other  Confirm Follow Up Transport: Family  The Plan for Transition of Care is Related to the Following Treatment Goals : acute renal failure  The Patient and/or Patient Representative was Provided with a Choice of Provider and Agrees with the Discharge Plan?: Yes  Name of the Patient Representative Who was Provided with a Choice of Provider and Agrees with the Discharge Plan: Henry Martinez, patient  Discharge Location  Patient Expects to be Discharged to[de-identified] Skilled nursing facility

## 2022-11-18 NOTE — PROGRESS NOTES
Patient:  Lise Walls :  1940  Gender:  male  MRN #:  [id number]    Assessment:     Lise Walls is a 78y.o. year old male with ongoing CKD baseline cr of 2.3 range. Has been admitted with anasarca, fluid weight gain  over 2 weeks  He developed acute renal failure with progressive rise in creatinine of unknown etiology   CT scan of abdomen showed : cirrhotic morphology with moderate ascites   Does not have significant hematuria and proteinuria to suspect GN     USG is negative for obstructive uropathy      Acute renal failure on CKD stage 3G  Thrombocytopenia   Anemia   Cirrhosis of Liver  Ascites     Plan:   - He has robust urine output overnight without diuretics   - Hold off diuretics, continue Iv albumin   - Still causes of NAPOLEON is not known , renal biopsy is pending for today , DDAVP prior to biopsy   - Hep B surface antigen/e Antigen/Hep C antibody is  negative, SHIVANI- negative, ANCA/SPEP- pending   mildly low complement level - could be from immune complex deposition or infection    - As of now no urgent indication of dialysis but most likely he will need sooner   Less than 2 gram salt   Intake and output   Dose all meds for current EGFR     Discussed with the patient in detail about dialysis          Subjective/Interval Events    Says he is good   Urinating fine   No shortness of breath , no other symptoms   Had paracentesis this morning       Blood pressure (!) 110/52, pulse 92, temperature 97.7 °F (36.5 °C), resp. rate 18, height 6' (1.829 m), weight 123.2 kg (271 lb 8 oz), SpO2 100 %. Exam:    Pt awake and alert  Lung: clear to auscultation  Heart: s1s2 regualr no rubs or murmur  normal bowel sounds.   Ext: 2 + edema   CNS- Oriented to time , place and person       Recent Labs     22  0912   WBC 2.1*   HCT 26.8*   .9*       Recent Labs     22  0445 22  0315 22  1343      < >  --    CO2 23   < >  --    *   < >  --    INR  --   --  1.3*    < > = values in this interval not displayed. Recent Labs     11/17/22  0315   AGRAT 0.6*       USG retroperitoneum: - Increased left and right renal cortical echogenicity, suggestive of chronic  medical renal disease. No obstructive uropathy or acute sonographic  abnormality. Mild dependent changes of atelectasis and peribronchial areas of groundglass  density which may reflect a manifestation of small airways disease or  potentially alveolar edema    > No evidence of pleural effusion. 2. Within the abdomen/pelvis:     > Cirrhotic hepatic morphology and signal of portal hypertension with small  moderate amount of ascites.     > No abnormal bowel wall thickening or inflammatory changes. Prior ileocolonic  resection and reanastomosis. CT abdomen/pelvis: -       Medical Decision Making :    I have reviewed patient's record for pertinent labs, radiology and other test . I have reviewed old records. I have ordered labs, medications and radiology as necessary and indicated per patient's condition . Total time spent is approximately 32 minutes. Greater than 50 % of that time was spent in counseling and or care coordination.      Marylu Denny MD  Nephrology -[facility], [facility].

## 2022-11-18 NOTE — PROGRESS NOTES
Bedside and Verbal shift change report given to Bere Bird (oncoming nurse) by Christiano Alcazar (offgoing nurse). Report included the following information SBAR and MAR.

## 2022-11-19 LAB
ANION GAP SERPL CALC-SCNC: 13 MMOL/L (ref 3–18)
BASOPHILS # BLD: 0 K/UL (ref 0–0.1)
BASOPHILS NFR BLD: 1 % (ref 0–2)
BUN SERPL-MCNC: 116 MG/DL (ref 7–18)
BUN/CREAT SERPL: 16 (ref 12–20)
CALCIUM SERPL-MCNC: 8.3 MG/DL (ref 8.5–10.1)
CHLORIDE SERPL-SCNC: 103 MMOL/L (ref 100–111)
CO2 SERPL-SCNC: 23 MMOL/L (ref 21–32)
CREAT SERPL-MCNC: 7.09 MG/DL (ref 0.6–1.3)
DIFFERENTIAL METHOD BLD: ABNORMAL
EOSINOPHIL # BLD: 0.1 K/UL (ref 0–0.4)
EOSINOPHIL NFR BLD: 3 % (ref 0–5)
ERYTHROCYTE [DISTWIDTH] IN BLOOD BY AUTOMATED COUNT: 14.2 % (ref 11.6–14.5)
GLUCOSE BLD STRIP.AUTO-MCNC: 130 MG/DL (ref 70–110)
GLUCOSE BLD STRIP.AUTO-MCNC: 173 MG/DL (ref 70–110)
GLUCOSE BLD STRIP.AUTO-MCNC: 224 MG/DL (ref 70–110)
GLUCOSE BLD STRIP.AUTO-MCNC: 238 MG/DL (ref 70–110)
GLUCOSE SERPL-MCNC: 131 MG/DL (ref 74–99)
HCT VFR BLD AUTO: 24.1 % (ref 36–48)
HGB BLD-MCNC: 8.3 G/DL (ref 13–16)
IMM GRANULOCYTES # BLD AUTO: 0 K/UL (ref 0–0.04)
IMM GRANULOCYTES NFR BLD AUTO: 1 % (ref 0–0.5)
LYMPHOCYTES # BLD: 0.4 K/UL (ref 0.9–3.6)
LYMPHOCYTES NFR BLD: 19 % (ref 21–52)
MCH RBC QN AUTO: 35 PG (ref 24–34)
MCHC RBC AUTO-ENTMCNC: 34.4 G/DL (ref 31–37)
MCV RBC AUTO: 101.7 FL (ref 78–100)
MONOCYTES # BLD: 0.2 K/UL (ref 0.05–1.2)
MONOCYTES NFR BLD: 12 % (ref 3–10)
NEUTS SEG # BLD: 1.3 K/UL (ref 1.8–8)
NEUTS SEG NFR BLD: 66 % (ref 40–73)
NRBC # BLD: 0 K/UL (ref 0–0.01)
NRBC BLD-RTO: 0 PER 100 WBC
PLATELET # BLD AUTO: 57 K/UL (ref 135–420)
PMV BLD AUTO: 10.8 FL (ref 9.2–11.8)
POTASSIUM SERPL-SCNC: 3 MMOL/L (ref 3.5–5.5)
RBC # BLD AUTO: 2.37 M/UL (ref 4.35–5.65)
SODIUM SERPL-SCNC: 139 MMOL/L (ref 136–145)
WBC # BLD AUTO: 1.9 K/UL (ref 4.6–13.2)

## 2022-11-19 PROCEDURE — 80048 BASIC METABOLIC PNL TOTAL CA: CPT

## 2022-11-19 PROCEDURE — 36415 COLL VENOUS BLD VENIPUNCTURE: CPT

## 2022-11-19 PROCEDURE — 74011250637 HC RX REV CODE- 250/637: Performed by: STUDENT IN AN ORGANIZED HEALTH CARE EDUCATION/TRAINING PROGRAM

## 2022-11-19 PROCEDURE — C9399 UNCLASSIFIED DRUGS OR BIOLOG: HCPCS | Performed by: STUDENT IN AN ORGANIZED HEALTH CARE EDUCATION/TRAINING PROGRAM

## 2022-11-19 PROCEDURE — 65270000046 HC RM TELEMETRY

## 2022-11-19 PROCEDURE — 85025 COMPLETE CBC W/AUTO DIFF WBC: CPT

## 2022-11-19 PROCEDURE — 82962 GLUCOSE BLOOD TEST: CPT

## 2022-11-19 PROCEDURE — 74011000250 HC RX REV CODE- 250: Performed by: RADIOLOGY

## 2022-11-19 PROCEDURE — 74011250636 HC RX REV CODE- 250/636: Performed by: INTERNAL MEDICINE

## 2022-11-19 PROCEDURE — 74011250637 HC RX REV CODE- 250/637: Performed by: INTERNAL MEDICINE

## 2022-11-19 PROCEDURE — 74011000258 HC RX REV CODE- 258: Performed by: INTERNAL MEDICINE

## 2022-11-19 PROCEDURE — 74011250636 HC RX REV CODE- 250/636: Performed by: STUDENT IN AN ORGANIZED HEALTH CARE EDUCATION/TRAINING PROGRAM

## 2022-11-19 RX ORDER — POTASSIUM CHLORIDE 20 MEQ/1
40 TABLET, EXTENDED RELEASE ORAL ONCE
Status: COMPLETED | OUTPATIENT
Start: 2022-11-19 | End: 2022-11-19

## 2022-11-19 RX ADMIN — ALBUMIN HUMAN 12.5 G: 0.25 SOLUTION INTRAVENOUS at 00:36

## 2022-11-19 RX ADMIN — FERROUS SULFATE TAB 325 MG (65 MG ELEMENTAL FE) 325 MG: 325 (65 FE) TAB at 17:13

## 2022-11-19 RX ADMIN — ALBUMIN HUMAN 12.5 G: 0.25 SOLUTION INTRAVENOUS at 17:13

## 2022-11-19 RX ADMIN — POTASSIUM CHLORIDE 40 MEQ: 1500 TABLET, EXTENDED RELEASE ORAL at 12:38

## 2022-11-19 RX ADMIN — CEFTRIAXONE 1 G: 1 INJECTION, POWDER, FOR SOLUTION INTRAMUSCULAR; INTRAVENOUS at 09:25

## 2022-11-19 RX ADMIN — Medication 400 MG: at 21:10

## 2022-11-19 RX ADMIN — ALBUMIN HUMAN 12.5 G: 0.25 SOLUTION INTRAVENOUS at 07:01

## 2022-11-19 RX ADMIN — ALBUMIN HUMAN 12.5 G: 0.25 SOLUTION INTRAVENOUS at 12:38

## 2022-11-19 RX ADMIN — DOXAZOSIN MESYLATE 4 MG: 4 TABLET ORAL at 21:09

## 2022-11-19 RX ADMIN — FERROUS SULFATE TAB 325 MG (65 MG ELEMENTAL FE) 325 MG: 325 (65 FE) TAB at 09:25

## 2022-11-19 RX ADMIN — SODIUM CHLORIDE, PRESERVATIVE FREE 10 ML: 5 INJECTION INTRAVENOUS at 06:00

## 2022-11-19 RX ADMIN — CETIRIZINE HYDROCHLORIDE 10 MG: 10 TABLET, FILM COATED ORAL at 21:10

## 2022-11-19 RX ADMIN — Medication 2 TABLET: at 09:25

## 2022-11-19 RX ADMIN — SODIUM CHLORIDE, PRESERVATIVE FREE 10 ML: 5 INJECTION INTRAVENOUS at 21:11

## 2022-11-19 RX ADMIN — SODIUM CHLORIDE, PRESERVATIVE FREE 10 ML: 5 INJECTION INTRAVENOUS at 17:13

## 2022-11-19 RX ADMIN — Medication 2 TABLET: at 21:09

## 2022-11-19 NOTE — PROGRESS NOTES
Hospitalist Progress Note    Patient: Vanessa Troncoso. MRN: [id number]  CSN: [id number]    YOB: 1940  Age: 78 y.o. Sex: male    DOA: 11/12/2022 LOS:  LOS: 7 days            Assessment/Plan   NAPOLEON/CKD  Generalized ascites  Severe AS  Thrombocytopenia, chronic, no significant bleding  HtN  uti    Plan:  - awaiting kidney biopsy  -electrolytes/ volume management per nephrology  - rocphein empirically to complete course  - iron  - DNR/DNi      Patient Active Problem List   Diagnosis Code    Altered mental status O03.57    Acute metabolic encephalopathy Q78.38    Thrombocytopenia (HCC) D69.6    Aortic stenosis I35.0    CKD (chronic kidney disease) N18.9    Acute kidney injury superimposed on chronic kidney disease (HCC) N17.9, N18.9    Peripheral edema R60.9    Hypoxia R09.02    NAPOLEON (acute kidney injury) ([facility] [address] N17.9               Subjective:    cc: \"I want to get out of here\"  No acute events overnight      REVIEW OF SYSTEMS:  General: No fevers or chills. Cardiovascular: No chest pain or pressure. No palpitations. Pulmonary: No shortness of breath. Gastrointestinal: No nausea, vomiting. Objective:        Vital signs/Intake and Output:  Visit Vitals  /69   Pulse 99   Temp 97.7 °F (36.5 °C)   Resp 16   Ht 6' (1.829 m)   Wt 123.2 kg (271 lb 8 oz)   SpO2 99%   BMI 36.82 kg/m²     Current Shift:  11/19 0701 - 11/19 1900  In: 240 [P.O.:240]  Out: -   Last three shifts:  11/17 1901 - 11/19 0700  In: 50 [I.V.:50]  Out: 3300 [Urine:3300]    Body mass index is 36.82 kg/m².     Physical Exam:  GEN: Alert and oriented times three NAD, chronially ill appearing   EYES: conjunctiva normal, lids with out lesions  HEENT: MMM, No thyromegaly, no lymphadenopathy  HEART: RRR +S1 +S2, no JVD, pulses 2+ distally  LUNGS: CTA B/L no wheezes, rales or rhonchi  ABDOMEN: + BS, soft NT/ND no organomegaly,  No rebound  EXTREMITIES: ++ edema - cyanosis, cap refill normal   SKIN: no rashes or skin breakdown, no nodules, normal turgor +   Current Facility-Administered Medications   Medication Dose Route Frequency    sodium chloride (NS) flush 5-40 mL  5-40 mL IntraVENous Q8H    sodium chloride (NS) flush 5-40 mL  5-40 mL IntraVENous PRN    albumin, human-kjda 25% (ALBUMINEX) intravenous solution 12.5 g  12.5 g IntraVENous Q6H    doxazosin (CARDURA) tablet 4 mg  4 mg Oral QHS    magnesium oxide (MAG-OX) tablet 400 mg  400 mg Oral QHS    ferrous sulfate tablet 325 mg  1 Tablet Oral BID WITH MEALS    cetirizine (ZYRTEC) tablet 10 mg  10 mg Oral QHS    insulin lispro (HUMALOG) injection   SubCUTAneous AC&HS    glucose chewable tablet 16 g  4 Tablet Oral PRN    glucagon (GLUCAGEN) injection 1 mg  1 mg IntraMUSCular PRN    dextrose 10% infusion 0-250 mL  0-250 mL IntraVENous PRN    eltrombopag (PROMACTA) tablet 50 mg (Patient Supplied)  50 mg Oral ACB    cefTRIAXone (ROCEPHIN) 1 g in 0.9% sodium chloride (MBP/ADV) 50 mL MBP  1 g IntraVENous Q24H    Lactobacillus Acidoph & Bulgar (FLORANEX) tablet 2 Tablet  2 Tablet Oral BID         All the patient's labs over the past 24 hours were reviewed both during my initial daily workflow process and at the time notated as \"note time\" in [facility]. (It is not time stamped separately in this workflow.)  Select labs are listed below.         Labs: Results:       Chemistry Recent Labs     11/19/22  0607 11/18/22  0445 11/17/22  0315   * 133* 119*    139 138   K 3.0* 3.4* 3.1*    102 103   CO2 23 23 23   * 114* 108*   CREA 7.09* 7.05* 6.88*   CA 8.3* 8.1* 8.3*   AGAP 13 14 12   BUCR 16 16 16   AP  --   --  75   TP  --   --  5.1*   ALB  --   --  2.0*   GLOB  --   --  3.1   AGRAT  --   --  0.6*      CBC w/Diff Recent Labs     11/19/22  0607 11/18/22  0912 11/17/22  0315   WBC 1.9* 2.1* 2.6*   RBC 2.37* 2.58* 2.66*   HGB 8.3* 9.0* 9.2*   HCT 24.1* 26.8* 26.7*   PLT 57* 52* 64*   GRANS 66 66 67   LYMPH 19* 16* 17*   EOS 3 3 2          Coagulation Recent Labs     11/16/22  1343   PTP 16.6*   INR 1.3*       Lipid Panel Lab Results   Component Value Date/Time    Cholesterol, total 102 10/07/2022 03:45 AM    HDL Cholesterol 42 10/07/2022 03:45 AM    LDL, calculated 47.6 10/07/2022 03:45 AM    VLDL, calculated 12.4 10/07/2022 03:45 AM    Triglyceride 62 10/07/2022 03:45 AM    CHOL/HDL Ratio 2.4 10/07/2022 03:45 AM          Liver Enzymes Recent Labs     11/17/22  0315   TP 5.1*   ALB 2.0*   AP 75          Procedures/imaging: see electronic medical records for all procedures/Xrays and details which were not copied into this note but were reviewed prior to creation of [facility],   Internal Medicine/Geriatrics

## 2022-11-19 NOTE — PROGRESS NOTES
Patient:  Tata Ramos. :  1940  Gender:  male  MRN #:  [id number]    Assessment:     Tata Garcia is a 78y.o. year old male with ongoing CKD baseline cr of 2.3 range. Has been admitted with anasarca, fluid weight gain  over 2 weeks  He developed acute renal failure with progressive rise in creatinine of unknown etiology   CT scan of abdomen showed : cirrhotic morphology with moderate ascites , most likely from portal hypertension , no evidence of SBP   Does not have significant hematuria and proteinuria to suspect GN     USG is negative for obstructive uropathy   Renal artery doppler is inconclusive due to body habitus/bowel gas. Kidney size is decent with evidence of medical renal disease      Acute renal failure on CKD stage 3G  Thrombocytopenia   Anemia   Cirrhosis of Liver  Ascites  UTI      Plan:   - He has robust urine output overnight without diuretics   - Hold off diuretics, continue Iv albumin   - Still causes of NAPOLEON is not known , awaiting renal biopsy report . Renal function is worsening every day     - Hep B surface antigen/e Antigen/Hep C antibody is  negative, SHIVANI- negative, ANCA- negatve, SPEP- pending   mildly low complement level - could be from immune complex deposition or infection    - As of now no urgent indication of dialysis but most likely he will need sooner in next 48 hours if renal function continues to deteriorate and also depends what we see in biopsy   Less than 2 gram salt   Intake and output   Dose all meds for current EGFR     Discussed with the patient in detail about dialysis and renal prognosis          Subjective/Interval Events    Says he is good   Urinating fine   No shortness of breath , no other symptoms     Blood pressure 115/69, pulse 99, temperature 97.7 °F (36.5 °C), resp. rate 16, height 6' (1.829 m), weight 123.2 kg (271 lb 8 oz), SpO2 99 %.     Exam:    Pt awake and alert  Lung: clear to auscultation  Heart: s1s2 regualr no rubs or murmur  normal bowel sounds. Ext: 2 + edema   CNS- Oriented to time , place and person       Recent Labs     11/19/22  0607   WBC 1.9*   HCT 24.1*   .7*       Recent Labs     11/19/22  0607 11/17/22  0315 11/16/22  1343      < >  --    CO2 23   < >  --    *   < >  --    INR  --   --  1.3*    < > = values in this interval not displayed. Recent Labs     11/17/22  0315   AGRAT 0.6*       USG retroperitoneum: - Increased left and right renal cortical echogenicity, suggestive of chronic  medical renal disease. No obstructive uropathy or acute sonographic  abnormality. Mild dependent changes of atelectasis and peribronchial areas of groundglass  density which may reflect a manifestation of small airways disease or  potentially alveolar edema    > No evidence of pleural effusion. 2. Within the abdomen/pelvis:     > Cirrhotic hepatic morphology and signal of portal hypertension with small  moderate amount of ascites.     > No abnormal bowel wall thickening or inflammatory changes. Prior ileocolonic  resection and reanastomosis. CT abdomen/pelvis: -     Renal artery doppler: 11/16/22:- Inadequate study secondary to body habitus and increase bowel gas. Unable to document any color flow on left kidney and renal artery bilaterally. Medical Decision Making :    I have reviewed patient's record for pertinent labs, radiology and other test . I have reviewed old records. I have ordered labs, medications and radiology as necessary and indicated per patient's condition . Total time spent is approximately 31 minutes. Greater than 50 % of that time was spent in counseling and or care coordination.      Ayse Wright MD  Nephrology -[facility], [facility].

## 2022-11-19 NOTE — PROGRESS NOTES
Problem: Risk for Spread of Infection  Goal: Prevent transmission of infectious organism to others  Description: Prevent the transmission of infectious organisms to other patients, staff members, and visitors. Outcome: Progressing Towards Goal     Problem: Falls - Risk of  Goal: *Absence of Falls  Description: Document Katelin Gallardo Fall Risk and appropriate interventions in the flowsheet.   Outcome: Progressing Towards Goal  Note: Fall Risk Interventions:  Mobility Interventions: Communicate number of staff needed for ambulation/transfer         Medication Interventions: Bed/chair exit alarm    Elimination Interventions: Call light in reach

## 2022-11-19 NOTE — PROGRESS NOTES
Alert and oriented x4, assessments and Vs completed, alcocer care completed, pt encouraged to use call light for any needs, accu checks completed/

## 2022-11-19 NOTE — PROGRESS NOTES
Problem: Self Care Deficits Care Plan (Adult)  Goal: *Acute Goals and Plan of Care (Insert Text)  Description: Occupational Therapy Goals  Initiated 11/15/2022 within 7 day(s). 1.  Patient will perform grooming with minimal assistance/contact guard assist standing at sink for 5 minutes or more. 2.  Patient will perform upper body dressing with supervision/set-up. 3.  Patient will perform lower body dressing with moderate assistance . 4. Patient will perform toilet transfers with minimal assistance/contact guard assist.  5.  Patient will perform all aspects of toileting with minimal assistance/contact guard assist.  6.  Patient will participate in upper extremity therapeutic exercise/activities with supervision/set-up for 10 minutes. 7.  Patient will utilize energy conservation techniques during functional activities with verbal, visual, and tactile cues. Outcome: Progressing Towards Goal    OCCUPATIONAL THERAPY TREATMENT    Patient: Janett Arredondo (78 y.o. male)  Date: 11/18/2022  Diagnosis: Hypoxia [R09.02]  NAPOLEON (acute kidney injury) ([facility] [address] [N17.9]  Aortic stenosis [I35.0]  Peripheral edema [R60.9] NAPOLEON (acute kidney injury) ([facility] [address]      Precautions: Fall  PLOF:     Chart, occupational therapy assessment, plan of care, and goals were reviewed. ASSESSMENT:  Pt presented seated on toilet at the beginning of session. Pt assisted with sit<>stand transfers, shawanda care with min-CGA. Pt participate with ambulation in hallway with PT and was left seated EOB to eat dinner at the end of session in NAD. Pt was provided with toilet tongs to assist with shawanda care. Progression toward goals:  []          Improving appropriately and progressing toward goals  [x]          Improving slowly and progressing toward goals  []          Not making progress toward goals and plan of care will be adjusted     PLAN:  Patient continues to benefit from skilled intervention to address the above impairments.   Continue treatment per established plan of care. Discharge Recommendations: Rehab   Further Equipment Recommendations for Discharge:  N/A    AMPA: 19/24    This AMPA score should be considered in conjunction with interdisciplinary team recommendations to determine the most appropriate discharge setting. Patient's social support, diagnosis, medical stability, and prior level of function should also be taken into consideration. SUBJECTIVE:   Patient stated I need something to help me wipe my butt.     OBJECTIVE DATA SUMMARY:   Cognitive/Behavioral Status:  Neurologic State: Alert  Orientation Level: Oriented X4  Cognition: Appropriate for age attention/concentration, Follows commands  Safety/Judgement: Fall prevention    Functional Mobility and Transfers for ADLs:   Transfers:  Sit to Stand: Contact guard assistance;Minimum assistance  Stand to Sit: Contact guard assistance;Minimum assistance   Toilet Transfer : Minimum assistance;Contact guard assistance  Balance:  Sitting: Intact  Standing: Impaired; With support  Standing - Static: Fair  Standing - Dynamic : Fair  ADL Intervention:  Toileting  Toileting Assistance: Minimum assistance  Bladder Hygiene: Minimum assistance  Bowel Hygiene: Minimum assistance  Clothing Management: Minimum assistance  Adaptive Equipment: Toilet tongs    Cognitive Retraining  Safety/Judgement: Fall prevention  Pain:  Pain level pre-treatment: 0/10   Pain level post-treatment: 0/10  Pain Intervention(s): Medication (see MAR); Rest, Ice, Repositioning   Response to intervention: Nurse notified, See doc flow    Activity Tolerance:    Fair +    Please refer to the flowsheet for vital signs taken during this treatment.   After treatment:   []  Patient left in no apparent distress sitting up in chair  [x]  Patient left in no apparent distress in bed  [x]  Call bell left within reach  [x]  Nursing notified  [x]  Caregiver present  []  Bed alarm activated    COMMUNICATION/EDUCATION:   [x] Role of Occupational Therapy in the acute care setting  [x] Home safety education was provided and the patient/caregiver indicated understanding. [x] Patient/family have participated as able in working towards goals and plan of care. [x] Patient/family agree to work toward stated goals and plan of care. [] Patient understands intent and goals of therapy, but is neutral about his/her participation. [] Patient is unable to participate in goal setting and plan of care. Thank you for this referral.  Humera Christine, OTR/L  Time Calculation: 20 mins    [employer] AM-PAC® Daily Activity Inpatient Short Form (6-Clicks)*    How much HELP from another person does the patient currently need    (If the patient hasn't done an activity recently, how much help from another person do you think he/she would need if he/she tried?)   Total (Total A or Dep)   A Lot  (Mod to Max A)   A Little (Sup or Min A)   None (Mod I to I)   Putting on and taking off regular lower body clothing? [] 1 [] 2 [x] 3 [] 4   2. Bathing (including washing, rinsing,      drying)? [] 1 [] 2 [x] 3 [] 4   3. Toileting, which includes using toilet, bedpan or urinal?   [] 1 [] 2 [x] 3 [] 4   4. Putting on and taking off regular upper body clothing? [] 1 [] 2 [x] 3 [] 4   5. Taking care of personal grooming such as brushing teeth? [] 1 [] 2 [x] 3 [] 4   6. Eating meals? [] 1 [] 2 [] 3 [x] 4      Based on an AM-PAC score of **/24 and their current ADL deficits; it is recommended that the patient have 5-7 sessions per week of Occupational Therapy at d/c to increase the patient's independence. Currently, this patient demonstrates the potential endurance, and/or tolerance for 3 hours of therapy each day at d/c. Based on an AM-PAC score of **/24 and their current ADL deficits; it is recommended that the patient have 3-5 sessions per week of Occupational Therapy at d/c to increase the patient's independence.       Based on an AM-PAC score of **/24 and their current ADL deficits; it is recommended that the patient have 2-3 sessions per week of Occupational Therapy at d/c to increase the patient's independence. At this time and based on an AM-PAC score of **/24, no further OT is recommended upon discharge due to (i.e. patient at baseline functional statusetc). Recommend patient returns to prior setting with prior services.

## 2022-11-20 LAB
ANION GAP SERPL CALC-SCNC: 13 MMOL/L (ref 3–18)
BASOPHILS # BLD: 0 K/UL (ref 0–0.1)
BASOPHILS NFR BLD: 1 % (ref 0–2)
BUN SERPL-MCNC: 119 MG/DL (ref 7–18)
BUN/CREAT SERPL: 17 (ref 12–20)
CALCIUM SERPL-MCNC: 8 MG/DL (ref 8.5–10.1)
CHLORIDE SERPL-SCNC: 106 MMOL/L (ref 100–111)
CO2 SERPL-SCNC: 23 MMOL/L (ref 21–32)
CREAT SERPL-MCNC: 6.89 MG/DL (ref 0.6–1.3)
DIFFERENTIAL METHOD BLD: ABNORMAL
EOSINOPHIL # BLD: 0 K/UL (ref 0–0.4)
EOSINOPHIL NFR BLD: 2 % (ref 0–5)
ERYTHROCYTE [DISTWIDTH] IN BLOOD BY AUTOMATED COUNT: 14.2 % (ref 11.6–14.5)
GLUCOSE BLD STRIP.AUTO-MCNC: 127 MG/DL (ref 70–110)
GLUCOSE BLD STRIP.AUTO-MCNC: 160 MG/DL (ref 70–110)
GLUCOSE BLD STRIP.AUTO-MCNC: 184 MG/DL (ref 70–110)
GLUCOSE BLD STRIP.AUTO-MCNC: 210 MG/DL (ref 70–110)
GLUCOSE SERPL-MCNC: 152 MG/DL (ref 74–99)
HCT VFR BLD AUTO: 24.5 % (ref 36–48)
HGB BLD-MCNC: 8.2 G/DL (ref 13–16)
IMM GRANULOCYTES # BLD AUTO: 0 K/UL (ref 0–0.04)
IMM GRANULOCYTES NFR BLD AUTO: 0 % (ref 0–0.5)
LYMPHOCYTES # BLD: 0.3 K/UL (ref 0.9–3.6)
LYMPHOCYTES NFR BLD: 19 % (ref 21–52)
MCH RBC QN AUTO: 35 PG (ref 24–34)
MCHC RBC AUTO-ENTMCNC: 33.5 G/DL (ref 31–37)
MCV RBC AUTO: 104.7 FL (ref 78–100)
MONOCYTES # BLD: 0.2 K/UL (ref 0.05–1.2)
MONOCYTES NFR BLD: 11 % (ref 3–10)
NEUTS SEG # BLD: 1.2 K/UL (ref 1.8–8)
NEUTS SEG NFR BLD: 68 % (ref 40–73)
NRBC # BLD: 0 K/UL (ref 0–0.01)
NRBC BLD-RTO: 0 PER 100 WBC
PLATELET # BLD AUTO: 54 K/UL (ref 135–420)
PMV BLD AUTO: 11.1 FL (ref 9.2–11.8)
POTASSIUM SERPL-SCNC: 3.2 MMOL/L (ref 3.5–5.5)
RBC # BLD AUTO: 2.34 M/UL (ref 4.35–5.65)
SODIUM SERPL-SCNC: 142 MMOL/L (ref 136–145)
WBC # BLD AUTO: 1.7 K/UL (ref 4.6–13.2)

## 2022-11-20 PROCEDURE — 74011250636 HC RX REV CODE- 250/636: Performed by: INTERNAL MEDICINE

## 2022-11-20 PROCEDURE — 74011000258 HC RX REV CODE- 258: Performed by: INTERNAL MEDICINE

## 2022-11-20 PROCEDURE — 97530 THERAPEUTIC ACTIVITIES: CPT

## 2022-11-20 PROCEDURE — 97116 GAIT TRAINING THERAPY: CPT

## 2022-11-20 PROCEDURE — 36415 COLL VENOUS BLD VENIPUNCTURE: CPT

## 2022-11-20 PROCEDURE — 65270000046 HC RM TELEMETRY

## 2022-11-20 PROCEDURE — 74011000250 HC RX REV CODE- 250: Performed by: RADIOLOGY

## 2022-11-20 PROCEDURE — 74011250637 HC RX REV CODE- 250/637: Performed by: STUDENT IN AN ORGANIZED HEALTH CARE EDUCATION/TRAINING PROGRAM

## 2022-11-20 PROCEDURE — 97535 SELF CARE MNGMENT TRAINING: CPT

## 2022-11-20 PROCEDURE — 74011250637 HC RX REV CODE- 250/637: Performed by: INTERNAL MEDICINE

## 2022-11-20 PROCEDURE — 74011636637 HC RX REV CODE- 636/637: Performed by: INTERNAL MEDICINE

## 2022-11-20 PROCEDURE — 97110 THERAPEUTIC EXERCISES: CPT

## 2022-11-20 PROCEDURE — 80048 BASIC METABOLIC PNL TOTAL CA: CPT

## 2022-11-20 PROCEDURE — 82962 GLUCOSE BLOOD TEST: CPT

## 2022-11-20 PROCEDURE — 74011250636 HC RX REV CODE- 250/636: Performed by: STUDENT IN AN ORGANIZED HEALTH CARE EDUCATION/TRAINING PROGRAM

## 2022-11-20 PROCEDURE — 85025 COMPLETE CBC W/AUTO DIFF WBC: CPT

## 2022-11-20 PROCEDURE — C9399 UNCLASSIFIED DRUGS OR BIOLOG: HCPCS | Performed by: STUDENT IN AN ORGANIZED HEALTH CARE EDUCATION/TRAINING PROGRAM

## 2022-11-20 RX ORDER — POTASSIUM CHLORIDE 20 MEQ/1
20 TABLET, EXTENDED RELEASE ORAL ONCE
Status: COMPLETED | OUTPATIENT
Start: 2022-11-20 | End: 2022-11-20

## 2022-11-20 RX ADMIN — Medication 2 TABLET: at 08:27

## 2022-11-20 RX ADMIN — Medication 2 TABLET: at 23:36

## 2022-11-20 RX ADMIN — Medication 2 UNITS: at 23:36

## 2022-11-20 RX ADMIN — DOXAZOSIN MESYLATE 4 MG: 4 TABLET ORAL at 23:36

## 2022-11-20 RX ADMIN — ALBUMIN HUMAN 12.5 G: 0.25 SOLUTION INTRAVENOUS at 23:44

## 2022-11-20 RX ADMIN — FERROUS SULFATE TAB 325 MG (65 MG ELEMENTAL FE) 325 MG: 325 (65 FE) TAB at 17:55

## 2022-11-20 RX ADMIN — SODIUM CHLORIDE, PRESERVATIVE FREE 10 ML: 5 INJECTION INTRAVENOUS at 17:54

## 2022-11-20 RX ADMIN — ALBUMIN HUMAN 12.5 G: 0.25 SOLUTION INTRAVENOUS at 17:55

## 2022-11-20 RX ADMIN — CETIRIZINE HYDROCHLORIDE 10 MG: 10 TABLET, FILM COATED ORAL at 23:36

## 2022-11-20 RX ADMIN — FERROUS SULFATE TAB 325 MG (65 MG ELEMENTAL FE) 325 MG: 325 (65 FE) TAB at 08:27

## 2022-11-20 RX ADMIN — POTASSIUM CHLORIDE 20 MEQ: 1500 TABLET, EXTENDED RELEASE ORAL at 12:34

## 2022-11-20 RX ADMIN — CEFTRIAXONE 1 G: 1 INJECTION, POWDER, FOR SOLUTION INTRAMUSCULAR; INTRAVENOUS at 06:08

## 2022-11-20 RX ADMIN — ALBUMIN HUMAN 12.5 G: 0.25 SOLUTION INTRAVENOUS at 06:08

## 2022-11-20 RX ADMIN — ALBUMIN HUMAN 12.5 G: 0.25 SOLUTION INTRAVENOUS at 00:06

## 2022-11-20 RX ADMIN — SODIUM CHLORIDE, PRESERVATIVE FREE 10 ML: 5 INJECTION INTRAVENOUS at 06:13

## 2022-11-20 RX ADMIN — SODIUM CHLORIDE, PRESERVATIVE FREE 10 ML: 5 INJECTION INTRAVENOUS at 23:45

## 2022-11-20 RX ADMIN — ALBUMIN HUMAN 12.5 G: 0.25 SOLUTION INTRAVENOUS at 12:00

## 2022-11-20 NOTE — PROGRESS NOTES
Patient:  Lizabeth Perez. :  1940  Gender:  male  MRN #:  [id number]    Assessment:     Lizabeth Mcdaniel is a 78y.o. year old male with ongoing CKD baseline cr of 2.3 range. Has been admitted with anasarca, fluid weight gain  over 2 weeks  He developed acute renal failure with progressive rise in creatinine of unknown etiology   CT scan of abdomen showed : cirrhotic morphology with moderate ascites , most likely from portal hypertension , no evidence of SBP   Does not have significant hematuria and proteinuria to suspect GN     USG is negative for obstructive uropathy   Renal artery doppler is inconclusive due to body habitus/bowel gas.    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 , full report to follow     Acute renal failure on CKD stage 3G  Thrombocytopenia   Anemia   Cirrhosis of Liver  Ascites  UTI      Plan:   - He has significant ATN in renal biopsy which most likely due to hemodynamics changes due to cirrhosis  ascites/third spacing of fluid leading to intravascular volume depletion and antibiotics also cause ATN - there is slim hope to recover this ATN but is difficult to ascertain time line   Apart from that 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 which most likely secondary to cirrhosis and crohn's disease, does not have crescent and classing findings of IgA nephropathy , oxalate crystal is due to Crohn's diease and recent antibiotics       He has decent  urine output overnight without diuretics but creatinine and BUN is still significantly elevated , no urgent need of dialysis now   If not much improvement in renal function in next 24-48 hours on iv albumin will start dialysis   - Hold off diuretics, continue Iv albumin   - Hep B surface antigen/e Antigen/Hep C antibody is  negative, SHIVANI- negative, ANCA- negatve, SPEP- pending     Less than 2 gram salt   Intake and output Dose all meds for current EGFR     Discussed with the patient and his wife in detail about dialysis and renal prognosis          Subjective/Interval Events    Says he is good   Urinating fine   No shortness of breath , no other symptoms     Blood pressure (!) 121/59, pulse 75, temperature 97.2 °F (36.2 °C), resp. rate 18, height 6' (1.829 m), weight 122.2 kg (269 lb 8 oz), SpO2 100 %. Exam:    Pt awake and alert  Lung: clear to auscultation  Heart: s1s2 regualr no rubs or murmur  normal bowel sounds. Ext: 2 + edema   CNS- Oriented to time , place and person       Recent Labs     11/20/22  0117   WBC 1.7*   HCT 24.5*   .7*       Recent Labs     11/20/22 0117      CO2 23   *       No results for input(s): ALBUMIN, AGRAT in the last 72 hours. No lab exists for component: TOTPR, SGOTAST, ALKPHOS, BILIT, BILID, SGPTALT, GLOBULIN    USG retroperitoneum: - Increased left and right renal cortical echogenicity, suggestive of chronic  medical renal disease. No obstructive uropathy or acute sonographic  abnormality. Mild dependent changes of atelectasis and peribronchial areas of groundglass  density which may reflect a manifestation of small airways disease or  potentially alveolar edema    > No evidence of pleural effusion. 2. Within the abdomen/pelvis:     > Cirrhotic hepatic morphology and signal of portal hypertension with small  moderate amount of ascites.     > No abnormal bowel wall thickening or inflammatory changes. Prior ileocolonic  resection and reanastomosis. CT abdomen/pelvis: -     Renal artery doppler: 11/16/22:- Inadequate study secondary to body habitus and increase bowel gas. Unable to document any color flow on left kidney and renal artery bilaterally. Medical Decision Making :    I have reviewed patient's record for pertinent labs, radiology and other test . I have reviewed old records.  I have ordered labs, medications and radiology as necessary and indicated per patient's condition . Total time spent is approximately 35 minutes. Greater than 50 % of that time was spent in counseling and or care coordination.      Luis Daniel Lewis MD  Nephrology -[facility], [facility].

## 2022-11-20 NOTE — PROGRESS NOTES
OCCUPATIONAL THERAPY TREATMENT    Problem: Self Care Deficits Care Plan (Adult)  Goal: *Acute Goals and Plan of Care (Insert Text)  Description: Occupational Therapy Goals  Initiated 11/15/2022 within 7 day(s). 1.  Patient will perform grooming with minimal assistance/contact guard assist standing at sink for 5 minutes or more. 2.  Patient will perform upper body dressing with supervision/set-up. 3.  Patient will perform lower body dressing with moderate assistance . 4. Patient will perform toilet transfers with minimal assistance/contact guard assist.  5.  Patient will perform all aspects of toileting with minimal assistance/contact guard assist.  6.  Patient will participate in upper extremity therapeutic exercise/activities with supervision/set-up for 10 minutes. 7.  Patient will utilize energy conservation techniques during functional activities with verbal, visual, and tactile cues. Outcome: Progressing Towards Goal     Patient: Roger Valdez. (78 y.o. male)  Date: 11/20/2022  Diagnosis: Hypoxia [R09.02]  NAPOLEON (acute kidney injury) ([facility] [address] [N17.9]  Aortic stenosis [I35.0]  Peripheral edema [R60.9] NAPOLEON (acute kidney injury) ([facility] [address]      Precautions: Fall      Chart, occupational therapy assessment, plan of care, and goals were reviewed. ASSESSMENT:  Pt supine in bed, indicating need to go to bathroom. SBA bed mobility. CGA stand with walker, able ot ambulate with no LOB, turn and pivot to sit on bathroom with instruction but no LOB. Pt with prior back surgery making hygiene difficult. Trailed different options including toilet tongs. Pt is able to perform with toilet tongs but unable to keep out of water soiling them. Educated on adjustments to keep upright however pt unable to perform. Practiced in standing but needs bilateral hand support currently. Pt has physical capability to perform with one hand through front method but not with the strength quite yet.  Pt is able to return to bed, supine with CGA. Nursing present to give med's. Recommend Rehab stay upon d/c. Progression toward goals:  []          Improving appropriately and progressing toward goals  [x]          Improving slowly and progressing toward goals  []          Not making progress toward goals and plan of care will be adjusted     PLAN:  Patient continues to benefit from skilled intervention to address the above impairments. Continue treatment per established plan of care. Discharge Recommendations:  Rehab  Further Equipment Recommendations for Discharge:  Shower chair     SUBJECTIVE:   Patient stated I need to go .     OBJECTIVE DATA SUMMARY:   Cognitive/Behavioral Status:  Neurologic State: Alert, Appropriate for age  Orientation Level: Oriented X4  Cognition: Appropriate decision making, Appropriate for age attention/concentration  Safety/Judgement: Fall prevention    Functional Mobility and Transfers for ADLs:   Bed Mobility:     Supine to Sit: Stand-by assistance  Sit to Supine: Contact guard assistance      Transfers:  Sit to Stand: Contact guard assistance                Toilet Transfer : Contact guard assistance           Bathroom Mobility: Contact guard assistance        Balance:  Sitting: Intact  Standing: Intact; With support  Standing - Static: Good  Standing - Dynamic : Fair    ADL Intervention:                                Toileting  Toileting Assistance: Minimum assistance  Bowel Hygiene: Minimum assistance  Clothing Management: Minimum assistance  Adaptive Equipment: Toilet tongs    Cognitive Retraining  Problem Solving: General alternative solution  Executive Functions: Managing time;Regulating behavior  Safety/Judgement: Fall prevention      Pain:  Pain level pre-treatment: 4/10   Pain level post-treatment: 4/10  Pain Intervention(s): Medication administered by RN (see MAR);  Rest, Ice, Repositioning   Response to intervention: Nurse notified, See doc flow sheet    Activity Tolerance:    Improving, no fatigued noted after bathroom tasks and ambulation     Please refer to the flowsheet for vital signs taken during this treatment. After treatment:   []  Patient left in no apparent distress sitting up in chair  [x]  Patient left in no apparent distress in bed  [x]  Call bell left within reach  [x]  Nursing notified  [x]  Caregiver present  []  Bed alarm activated    COMMUNICATION/EDUCATION:   [x] Role of Occupational Therapy in the acute care setting  [x] Home safety education was provided and the patient/caregiver indicated understanding. [x] Patient/family have participated as able in working towards goals and plan of care. [x] Patient/family agree to work toward stated goals and plan of care. [] Patient understands intent and goals of therapy, but is neutral about his/her participation. [] Patient is unable to participate in goal setting and plan of care.       Thank you for this referral.  Kam SMITH, OTR/L   Time Calculation: 26 mins

## 2022-11-20 NOTE — PROGRESS NOTES
Problem: Falls - Risk of  Goal: *Absence of Falls  Description: Document Cade Vizcarra Fall Risk and appropriate interventions in the flowsheet. Outcome: Progressing Towards Goal  Note: Fall Risk Interventions:  Mobility Interventions: Communicate number of staff needed for ambulation/transfer, Patient to call before getting OOB, PT Consult for mobility concerns         Medication Interventions: Bed/chair exit alarm, Assess postural VS orthostatic hypotension    Elimination Interventions: Call light in reach, Bed/chair exit alarm              Problem: Diabetes Self-Management  Goal: *Monitoring blood glucose, interpreting and using results  Description: Identify recommended blood glucose targets  and personal targets. Outcome: Progressing Towards Goal     Problem: Diabetes Self-Management  Goal: *Prevention, detection, treatment of acute complications  Description: List symptoms of hyper- and hypoglycemia; describe how to treat low blood sugar and actions for lowering  high blood glucose level.   Outcome: Progressing Towards Goal

## 2022-11-20 NOTE — PROGRESS NOTES
Hospitalist Progress Note    Patient: Lori Huddleston. MRN: [id number]  CSN: [id number]    YOB: 1940  Age: 78 y.o. Sex: male    DOA: 11/12/2022 LOS:  LOS: 8 days            Assessment/Plan   NAPOLEON/CKD  Generalized ascites  Severe AS  Thrombocytopenia, chronic, no significant bleding  HtN  uti      Plan:  - awaiting kidney biopsy  - lectrolytes/ volume beng managed by nephrology  - rocphein empirically to complete course 11/22  - iron  - DNR/DNi      Patient Active Problem List   Diagnosis Code    Altered mental status J00.27    Acute metabolic encephalopathy O71.42    Thrombocytopenia (HCC) D69.6    Aortic stenosis I35.0    CKD (chronic kidney disease) N18.9    Acute kidney injury superimposed on chronic kidney disease (HCC) N17.9, N18.9    Peripheral edema R60.9    Hypoxia R09.02    NAPOLEON (acute kidney injury) ([facility] [address] N17.9               Subjective:    cc: \" im sick of being here\"  No acute events overnight        REVIEW OF SYSTEMS:  General: No fevers or chills. Cardiovascular: No chest pain or pressure. No palpitations. Pulmonary: No shortness of breath. Gastrointestinal: No nausea, vomiting. Objective:        Vital signs/Intake and Output:  Visit Vitals  /62   Pulse 84   Temp 98.2 °F (36.8 °C)   Resp 18   Ht 6' (1.829 m)   Wt 122.2 kg (269 lb 8 oz)   SpO2 99%   BMI 36.55 kg/m²     Current Shift:  No intake/output data recorded. Last three shifts:  11/18 1901 - 11/20 0700  In: 56 [P.O.:390; I.V.:100]  Out: 2050 [Urine:2050]    Body mass index is 36.55 kg/m².     Physical Exam:  GEN: Alert and oriented times three NAD  EYES: conjunctiva normal, lids with out lesions  HEENT: MMM, No thyromegaly, no lymphadenopathy  HEART: RRR +S1 +S2, no JVD, pulses 2+ distally  LUNGS: CTA B/L no wheezes, rales or rhonchi  ABDOMEN: + BS, + ascitesno organomegaly,  No rebound  EXTREMITIES: ++ edema-  cyanosis, cap refill normal   SKIN: no rashes or skin breakdown, no nodules, normal turgor  Current Facility-Administered Medications   Medication Dose Route Frequency    sodium chloride (NS) flush 5-40 mL  5-40 mL IntraVENous Q8H    sodium chloride (NS) flush 5-40 mL  5-40 mL IntraVENous PRN    albumin, human-kjda 25% (ALBUMINEX) intravenous solution 12.5 g  12.5 g IntraVENous Q6H    doxazosin (CARDURA) tablet 4 mg  4 mg Oral QHS    magnesium oxide (MAG-OX) tablet 400 mg  400 mg Oral QHS    ferrous sulfate tablet 325 mg  1 Tablet Oral BID WITH MEALS    cetirizine (ZYRTEC) tablet 10 mg  10 mg Oral QHS    insulin lispro (HUMALOG) injection   SubCUTAneous AC&HS    glucose chewable tablet 16 g  4 Tablet Oral PRN    glucagon (GLUCAGEN) injection 1 mg  1 mg IntraMUSCular PRN    dextrose 10% infusion 0-250 mL  0-250 mL IntraVENous PRN    eltrombopag (PROMACTA) tablet 50 mg (Patient Supplied)  50 mg Oral ACB    cefTRIAXone (ROCEPHIN) 1 g in 0.9% sodium chloride (MBP/ADV) 50 mL MBP  1 g IntraVENous Q24H    Lactobacillus Acidoph & Bulgar (FLORANEX) tablet 2 Tablet  2 Tablet Oral BID         All the patient's labs over the past 24 hours were reviewed both during my initial daily workflow process and at the time notated as \"note time\" in [facility]. (It is not time stamped separately in this workflow.)  Select labs are listed below.         Labs: Results:       Chemistry Recent Labs     11/20/22 0117 11/19/22  0607 11/18/22  0445   * 131* 133*    139 139   K 3.2* 3.0* 3.4*    103 102   CO2 23 23 23   * 116* 114*   CREA 6.89* 7.09* 7.05*   CA 8.0* 8.3* 8.1*   AGAP 13 13 14   BUCR 17 16 16      CBC w/Diff Recent Labs     11/20/22 0117 11/19/22  0607 11/18/22  0912   WBC 1.7* 1.9* 2.1*   RBC 2.34* 2.37* 2.58*   HGB 8.2* 8.3* 9.0*   HCT 24.5* 24.1* 26.8*   PLT 54* 57* 52*   GRANS 68 66 66   LYMPH 19* 19* 16*   EOS 2 3 3              Lipid Panel Lab Results   Component Value Date/Time    Cholesterol, total 102 10/07/2022 03:45 AM    HDL Cholesterol 42 10/07/2022 03:45 AM LDL, calculated 47.6 10/07/2022 03:45 AM    VLDL, calculated 12.4 10/07/2022 03:45 AM    Triglyceride 62 10/07/2022 03:45 AM    CHOL/HDL Ratio 2.4 10/07/2022 03:45 AM                  Procedures/imaging: see electronic medical records for all procedures/Xrays and details which were not copied into this note but were reviewed prior to creation of [address] [place] [address] Rd, DO  Internal Medicine/Geriatrics

## 2022-11-20 NOTE — PROGRESS NOTES
Problem: Risk for Spread of Infection  Goal: Prevent transmission of infectious organism to others  Description: Prevent the transmission of infectious organisms to other patients, staff members, and visitors. Outcome: Progressing Towards Goal     Problem: Patient Education:  Go to Education Activity  Goal: Patient/Family Education  Outcome: Progressing Towards Goal     Problem: Falls - Risk of  Goal: *Absence of Falls  Description: Document Nehal Sjadamsjerrell Fall Risk and appropriate interventions in the flowsheet.   Outcome: Progressing Towards Goal  Note: Fall Risk Interventions:  Mobility Interventions: Communicate number of staff needed for ambulation/transfer, Patient to call before getting OOB, PT Consult for mobility concerns         Medication Interventions: Bed/chair exit alarm, Assess postural VS orthostatic hypotension    Elimination Interventions: Call light in reach, Bed/chair exit alarm

## 2022-11-21 LAB
ALBUMIN SERPL-MCNC: 2.9 G/DL (ref 3.4–5)
ANION GAP SERPL CALC-SCNC: 12 MMOL/L (ref 3–18)
BASOPHILS # BLD: 0 K/UL (ref 0–0.1)
BASOPHILS NFR BLD: 1 % (ref 0–2)
BUN SERPL-MCNC: 109 MG/DL (ref 7–18)
BUN/CREAT SERPL: 17 (ref 12–20)
CALCIUM SERPL-MCNC: 8.6 MG/DL (ref 8.5–10.1)
CHLORIDE SERPL-SCNC: 105 MMOL/L (ref 100–111)
CO2 SERPL-SCNC: 24 MMOL/L (ref 21–32)
CREAT SERPL-MCNC: 6.39 MG/DL (ref 0.6–1.3)
DIFFERENTIAL METHOD BLD: ABNORMAL
EOSINOPHIL # BLD: 0.1 K/UL (ref 0–0.4)
EOSINOPHIL NFR BLD: 3 % (ref 0–5)
ERYTHROCYTE [DISTWIDTH] IN BLOOD BY AUTOMATED COUNT: 14 % (ref 11.6–14.5)
GLUCOSE BLD STRIP.AUTO-MCNC: 236 MG/DL (ref 70–110)
GLUCOSE SERPL-MCNC: 116 MG/DL (ref 74–99)
HCT VFR BLD AUTO: 23.7 % (ref 36–48)
HGB BLD-MCNC: 7.9 G/DL (ref 13–16)
IMM GRANULOCYTES # BLD AUTO: 0 K/UL (ref 0–0.04)
IMM GRANULOCYTES NFR BLD AUTO: 0 % (ref 0–0.5)
LYMPHOCYTES # BLD: 0.3 K/UL (ref 0.9–3.6)
LYMPHOCYTES NFR BLD: 20 % (ref 21–52)
MCH RBC QN AUTO: 34.8 PG (ref 24–34)
MCHC RBC AUTO-ENTMCNC: 33.3 G/DL (ref 31–37)
MCV RBC AUTO: 104.4 FL (ref 78–100)
MONOCYTES # BLD: 0.2 K/UL (ref 0.05–1.2)
MONOCYTES NFR BLD: 12 % (ref 3–10)
NEUTS SEG # BLD: 1 K/UL (ref 1.8–8)
NEUTS SEG NFR BLD: 64 % (ref 40–73)
NRBC # BLD: 0 K/UL (ref 0–0.01)
NRBC BLD-RTO: 0 PER 100 WBC
PLATELET # BLD AUTO: 54 K/UL (ref 135–420)
PMV BLD AUTO: 10.9 FL (ref 9.2–11.8)
POTASSIUM SERPL-SCNC: 3.4 MMOL/L (ref 3.5–5.5)
RBC # BLD AUTO: 2.27 M/UL (ref 4.35–5.65)
SODIUM SERPL-SCNC: 141 MMOL/L (ref 136–145)
WBC # BLD AUTO: 1.6 K/UL (ref 4.6–13.2)

## 2022-11-21 PROCEDURE — 74011000258 HC RX REV CODE- 258: Performed by: INTERNAL MEDICINE

## 2022-11-21 PROCEDURE — C9399 UNCLASSIFIED DRUGS OR BIOLOG: HCPCS | Performed by: STUDENT IN AN ORGANIZED HEALTH CARE EDUCATION/TRAINING PROGRAM

## 2022-11-21 PROCEDURE — 97535 SELF CARE MNGMENT TRAINING: CPT

## 2022-11-21 PROCEDURE — 74011250636 HC RX REV CODE- 250/636: Performed by: INTERNAL MEDICINE

## 2022-11-21 PROCEDURE — 65270000046 HC RM TELEMETRY

## 2022-11-21 PROCEDURE — 74011000250 HC RX REV CODE- 250: Performed by: RADIOLOGY

## 2022-11-21 PROCEDURE — 97116 GAIT TRAINING THERAPY: CPT

## 2022-11-21 PROCEDURE — 99233 SBSQ HOSP IP/OBS HIGH 50: CPT | Performed by: INTERNAL MEDICINE

## 2022-11-21 PROCEDURE — 80048 BASIC METABOLIC PNL TOTAL CA: CPT

## 2022-11-21 PROCEDURE — 36415 COLL VENOUS BLD VENIPUNCTURE: CPT

## 2022-11-21 PROCEDURE — 97164 PT RE-EVAL EST PLAN CARE: CPT

## 2022-11-21 PROCEDURE — 85025 COMPLETE CBC W/AUTO DIFF WBC: CPT

## 2022-11-21 PROCEDURE — 97530 THERAPEUTIC ACTIVITIES: CPT

## 2022-11-21 PROCEDURE — 82040 ASSAY OF SERUM ALBUMIN: CPT

## 2022-11-21 PROCEDURE — 82962 GLUCOSE BLOOD TEST: CPT

## 2022-11-21 PROCEDURE — 74011250636 HC RX REV CODE- 250/636: Performed by: STUDENT IN AN ORGANIZED HEALTH CARE EDUCATION/TRAINING PROGRAM

## 2022-11-21 PROCEDURE — 97110 THERAPEUTIC EXERCISES: CPT

## 2022-11-21 PROCEDURE — 74011250637 HC RX REV CODE- 250/637: Performed by: INTERNAL MEDICINE

## 2022-11-21 PROCEDURE — 74011250637 HC RX REV CODE- 250/637: Performed by: STUDENT IN AN ORGANIZED HEALTH CARE EDUCATION/TRAINING PROGRAM

## 2022-11-21 RX ORDER — POTASSIUM CHLORIDE 20 MEQ/1
20 TABLET, EXTENDED RELEASE ORAL ONCE
Status: COMPLETED | OUTPATIENT
Start: 2022-11-21 | End: 2022-11-21

## 2022-11-21 RX ADMIN — FERROUS SULFATE TAB 325 MG (65 MG ELEMENTAL FE) 325 MG: 325 (65 FE) TAB at 10:46

## 2022-11-21 RX ADMIN — ALBUMIN HUMAN 25 G: 0.25 SOLUTION INTRAVENOUS at 12:14

## 2022-11-21 RX ADMIN — CEFTRIAXONE 1 G: 1 INJECTION, POWDER, FOR SOLUTION INTRAMUSCULAR; INTRAVENOUS at 10:46

## 2022-11-21 RX ADMIN — SODIUM CHLORIDE, PRESERVATIVE FREE 10 ML: 5 INJECTION INTRAVENOUS at 05:50

## 2022-11-21 RX ADMIN — Medication 2 TABLET: at 22:13

## 2022-11-21 RX ADMIN — SODIUM CHLORIDE, PRESERVATIVE FREE 10 ML: 5 INJECTION INTRAVENOUS at 17:17

## 2022-11-21 RX ADMIN — POTASSIUM CHLORIDE 20 MEQ: 1500 TABLET, EXTENDED RELEASE ORAL at 11:02

## 2022-11-21 RX ADMIN — Medication 2 TABLET: at 10:46

## 2022-11-21 RX ADMIN — DOXAZOSIN MESYLATE 4 MG: 4 TABLET ORAL at 22:13

## 2022-11-21 RX ADMIN — SODIUM CHLORIDE, PRESERVATIVE FREE 10 ML: 5 INJECTION INTRAVENOUS at 23:03

## 2022-11-21 RX ADMIN — CETIRIZINE HYDROCHLORIDE 10 MG: 10 TABLET, FILM COATED ORAL at 22:13

## 2022-11-21 RX ADMIN — FERROUS SULFATE TAB 325 MG (65 MG ELEMENTAL FE) 325 MG: 325 (65 FE) TAB at 17:16

## 2022-11-21 RX ADMIN — ALBUMIN HUMAN 25 G: 0.25 SOLUTION INTRAVENOUS at 17:16

## 2022-11-21 NOTE — PROGRESS NOTES
Hospitalist Progress Note    Patient: Christine Salomon MRN: [id number]  CSN: [id number]    YOB: 1940  Age: 78 y.o. Sex: male    DOA: 11/12/2022 LOS:  LOS: 9 days          Chief Complaint:  ARF        Assessment/Plan       NAPOLEON/CKD-due to ATN  S/p biopsy  Generalized ascites s/p para  Severe AS  Thrombocytopenia, chronic, no significant bleding  pancytopenia  HtN  Uti-finishing treatment        - electrolytes/ volume beng managed by nephrology  - rocphein empirically to complete course 11/22  - dialysis will start soon as no renal recovery  - DNR/DNi    As above    Dialysis decision being made by family       Disposition :  Patient Active Problem List   Diagnosis Code    Altered mental status Z25.19    Acute metabolic encephalopathy G61.40    Thrombocytopenia (HCC) D69.6    Aortic stenosis I35.0    CKD (chronic kidney disease) N18.9    Acute kidney injury superimposed on chronic kidney disease ([address] N17.9, N18.9    Peripheral edema R60.9    Hypoxia R09.02    NAPOLEON (acute kidney injury) ([facility] [address] N17.9       Subjective:  No new isues  Worked with PT this am  Denies pain, nausea  Annoyed by beeping things in room        Review of systems:    Constitutional: denies fevers, chills  Respiratory: denies SOB, cough  Cardiovascular: denies chest pain, palpitations  Gastrointestinal: denies nausea, vomiting      Vital signs/Intake and Output:  Visit Vitals  BP (!) 81/45 (BP 1 Location: Left upper arm, BP Patient Position: At rest)   Pulse 79   Temp 97.7 °F (36.5 °C)   Resp 18   Ht 6' (1.829 m)   Wt 122.5 kg (270 lb)   SpO2 97%   BMI 36.62 kg/m²     Current Shift:  No intake/output data recorded. Last three shifts:  11/19 1901 - 11/21 0700  In: 250 [P.O.:150;  I.V.:100]  Out: 2200 [Urine:2200]    Exam:    General: Well developed, elderly WM, alert, NAD, OX3  CVS:Regular rate and rhythm, no M/R/G, S1/S2 heard, no thrill  Lungs:Clear to auscultation bilaterally, no wheezes, rhonchi, or rales  Abdomen: Soft, Nontender, No distention, Normal Bowel sounds  Extremities: edema 2 plus  Neuro:grossly normal , follows commands  Psych:appropriate                Labs: Results:       Chemistry Recent Labs     11/21/22  0515 11/20/22  0117 11/19/22  0607   * 152* 131*    142 139   K 3.4* 3.2* 3.0*    106 103   CO2 24 23 23   * 119* 116*   CREA 6.39* 6.89* 7.09*   CA 8.6 8.0* 8.3*   AGAP 12 13 13   BUCR 17 17 16      CBC w/Diff Recent Labs     11/21/22  0515 11/20/22  0117 11/19/22  0607   WBC 1.6* 1.7* 1.9*   RBC 2.27* 2.34* 2.37*   HGB 7.9* 8.2* 8.3*   HCT 23.7* 24.5* 24.1*   PLT 54* 54* 57*   GRANS 64 68 66   LYMPH 20* 19* 19*   EOS 3 2 3      Cardiac Enzymes No results for input(s): CPK, CKND1, CESAR in the last 72 hours. No lab exists for component: CKRMB, TROIP   Coagulation No results for input(s): PTP, INR, APTT, INREXT in the last 72 hours. Lipid Panel Lab Results   Component Value Date/Time    Cholesterol, total 102 10/07/2022 03:45 AM    HDL Cholesterol 42 10/07/2022 03:45 AM    LDL, calculated 47.6 10/07/2022 03:45 AM    VLDL, calculated 12.4 10/07/2022 03:45 AM    Triglyceride 62 10/07/2022 03:45 AM    CHOL/HDL Ratio 2.4 10/07/2022 03:45 AM      BNP No results for input(s): BNPP in the last 72 hours. Liver Enzymes No results for input(s): TP, ALB, TBIL, AP in the last 72 hours.     No lab exists for component: SGOT, GPT, DBIL   Thyroid Studies No results found for: T4, T3U, TSH, TSHEXT     Procedures/imaging: see electronic medical records for all procedures/Xrays and details which were not copied into this note but were reviewed prior to creation of Rhae MD Radha

## 2022-11-21 NOTE — PROGRESS NOTES
Problem: Mobility Impaired (Adult and Pediatric)  Goal: *Acute Goals and Plan of Care (Insert Text)  Description: Physical Therapy Goals   Initiated 11/14/2022 and to be accomplished within 7 day(s)  1. Patient will move from supine <> sit with SBA in prep for out of bed activity and change of position. 2.  Patient will perform sit<> stand with SBA with RW in prep for transfers/ambulation. 3.  Patient will ambulate 50 feet with S/RW for improved functional mobility at discharge. 4.  Patient will ascend/descend 1stairs with handrail(s) with minimal assistance/contact guard assist for home re-entry as needed. Reviewed and updated 11/21/2022 and to be accomplished within 7 day(s)  1. Patient will move from supine <> sit with supervision in prep for out of bed activity and change of position. (Upgraded)  2. Patient will perform sit<> stand with supervision with RW in prep for transfers/ambulation. (Upgraded)  3. Patient will ambulate 150 feet with Supervision, RW for improved functional mobility at discharge. (Upgraded)  4. Patient will ascend/descend 1stairs with handrail(s) with minimal assistance/contact guard assist for home re-entry as needed. (D/c goal, pt only has threshold entry)              Note:   PHYSICAL THERAPY RE-EVALUATION    Patient: Roger Valdez. (78 y.o. male)  Date: 11/21/2022  Primary Diagnosis: Hypoxia [R09.02]  NAPOLEON (acute kidney injury) ([facility] [address] [N17.9]  Aortic stenosis [I35.0]  Peripheral edema [R60.9]  Precautions:  Fall  PLOF: Independent ambulation short distances    ASSESSMENT :  Based on the objective data described below, the patient presents with continued LE strength and decreased endurance, impaired bed mobility and transfers, and decreased activity tolerance. Pt progressing well with mobility and able to increase gait distance to 120 ft today with RW and CGA.  Reviewed home safety, activity recommendations, pacing, use of RW, and home exercise program with pt and wife.    Patient will benefit from skilled intervention to address the above impairments. Patient's rehabilitation potential is considered to be Good  Factors which may influence rehabilitation potential include:   []         None noted  []         Mental ability/status  [x]         Medical condition  []         Home/family situation and support systems  []         Safety awareness  []         Pain tolerance/management  []         Other:      PLAN :  Recommendations and Planned Interventions:   [x]           Bed Mobility Training             []    Neuromuscular Re-Education  [x]           Transfer Training                   []    Orthotic/Prosthetic Training  [x]           Gait Training                          []    Modalities  [x]           Therapeutic Exercises           []    Edema Management/Control  [x]           Therapeutic Activities            []    Family Training/Education  [x]           Patient Education  []           Other (comment):    Frequency/Duration: Patient will be followed by physical therapy 1-2 times per day/4-7 days per week to address goals. Discharge Recommendations: Home Health  Further Equipment Recommendations for Discharge: N/A    AMPAC: 15/20    This AMPAC score should be considered in conjunction with interdisciplinary team recommendations to determine the most appropriate discharge setting. Patient's social support, diagnosis, medical stability, and prior level of function should also be taken into consideration. SUBJECTIVE:   Patient stated I am doing ok, I am ready to go home.     OBJECTIVE DATA SUMMARY:   Hospital course since last seen and reason for re-evaluation: prolonged hospitalization  Past Medical History:   Diagnosis Date    Bladder calculus 2015    Chronic kidney disease     H/O kidney stones    Crohn disease ([facility] [address]     Foot drop     left    Hypertension     Kidney calculi      Past Surgical History:   Procedure Laterality Date    HX BACK SURGERY      3 back surgery nola and screw    HX CATARACT REMOVAL Bilateral     HX GI      bowel [id number]'s    HX GI      hemorrhoidectomy    HX UROLOGICAL      kidney stone removal     Barriers to Learning/Limitations: None  Compensate with: Visual Cues and Verbal Cues  Home Situation:   Home Situation  Home Environment: Private residence  # Steps to Enter: 1  One/Two Story Residence: One story  Living Alone: No  Support Systems: Spouse/Significant Other  Patient Expects to be Discharged to[de-identified] Home with home health  Current DME Used/Available at Home: Lynnda Leventhal, rollator  Tub or Shower Type: Shower  Critical Behavior:  Neurologic State: Alert; Appropriate for age  Orientation Level: Oriented X4  Cognition: Appropriate decision making; Appropriate for age attention/concentration  Safety/Judgement: Fall prevention  Psychosocial  Patient Behaviors: Calm; Cooperative  Purposeful Interaction: Yes  Pt Identified Daily Priority: Clinical issues (comment)  Caritas Process: Nurture loving kindness;Establish trust;Teaching/learning  Caring Interventions: Reassure  Reassure: Therapeutic listening  Therapeutic Modalities: Humor  Functional Mobility:  Bed Mobility:   Supine to Sit: Stand-by assistance  Sit to Supine: Stand-by assistance   Transfers:  Sit to Stand: Contact guard assistance  Stand to Sit: Contact guard assistance  Balance:   Sitting: Intact  Standing: Intact; With support  Ambulation/Gait Training:  Distance (ft): 120 Feet (ft)  Assistive Device: Gait belt;Walker, rolling  Ambulation - Level of Assistance: Contact guard assistance  Gait Abnormalities: Decreased step clearance  Base of Support: Widened   Speed/Sandra: Slow  Step Length: Left shortened;Right shortened  Pain:  Pain level pre-treatment: 0/10   Pain level post-treatment: 0/10   Pain Intervention(s) : Medication (see MAR);  Rest, Ice, Repositioning   Response to intervention: Nurse notified, See doc flow    Activity Tolerance:   Good  Please refer to the flowsheet for vital signs taken during this treatment. After treatment:   []         Patient left in no apparent distress sitting up in chair  [x]         Patient left in no apparent distress in bed  [x]         Call bell left within reach  [x]         Nursing notified  []         Caregiver present  []         Bed alarm activated  []         SCDs applied    COMMUNICATION/EDUCATION:   [x]         Role of Physical Therapy in the acute care setting. [x]         Fall prevention education was provided and the patient/caregiver indicated understanding. [x]         Patient/family have participated as able in goal setting and plan of care. []         Patient/family agree to work toward stated goals and plan of care. []         Patient understands intent and goals of therapy, but is neutral about his/her participation. []         Patient is unable to participate in goal setting/plan of care: ongoing with therapy staff.  []         Other: Thank you for this referral.  Kevin Hebertpau Martha   Time Calculation: 40 mins    Frantz Torres AM-PAC® Basic Mobility Inpatient Short Form (6-Clicks) Version 2    How much HELP from another person does the patient currently need    (If the patient hasn't done an activity recently, how much help from another person do you think he/she would need if he/she tried?)   Total (Total A or Dep)   A Lot  (Mod to Max A)   A Little (Sup or Min A)   None (Mod I to I)   Turning from your back to your side while in a flat bed without using bedrails? [] 1 [] 2 [x] 3 [] 4   2. Moving from lying on your back to sitting on the side of a flat bed without using bedrails? [] 1 [] 2 [x] 3 [] 4   3. Moving to and from a bed to a chair (including a wheelchair)? [] 1 [] 2 [x] 3 [] 4   4. Standing up from a chair using your arms (e.g., wheelchair, or bedside chair)? [] 1 [] 2 [x] 3 [] 4   5. Walking in hospital room? [] 1 [] 2 [x] 3 [] 4   6.  Climbing 3-5 steps with a railing?+   [] 1 [] 2 [] 3 [] 4   +If stair climbing cannot be assessed, skip item #6. Sum responses from items 1-5.

## 2022-11-21 NOTE — PROGRESS NOTES
D/C PLan: Home with HH and bariatric RW    CM rounded with therapy inpatient's room. Per therapy they are recommending HH and a bariatric RW. CM offered FOC. Noted therapy recommendation. CM Met with pt/family and explained CM role and to discuss the plan of care. Pt and family are in agreement with therapy recommendations. Offered freedom of choice for SNF/HH. Provided a list of area agencies/facilities to refer to to assist family with making a determiniation. Family chose [facility] choice and [facility] as second choice. CMS referral placed. Order for bariatric RW placed. Care Management Interventions  PCP Verified by CM:  Yes  Mode of Transport at Discharge: Self  Transition of Care Consult (CM Consult): [address]: Yes  Physical Therapy Consult: Yes  Occupational Therapy Consult: Yes  Support Systems: Spouse/Significant Other  Confirm Follow Up Transport: Family  The Plan for Transition of Care is Related to the Following Treatment Goals : acute renal failure  The Patient and/or Patient Representative was Provided with a Choice of Provider and Agrees with the Discharge Plan?: Yes  Name of the Patient Representative Who was Provided with a Choice of Provider and Agrees with the Discharge Plan: Ga Montano, patient  [place] of Choice List was Provided with Basic Dialogue that Supports the Patient's Individualized Plan of Care/Goals, Treatment Preferences and Shares the Quality Data Associated with the Providers?: Yes  Discharge Location  Patient Expects to be Discharged to[de-identified] Home with home health

## 2022-11-21 NOTE — PROGRESS NOTES
Comprehensive Nutrition Assessment    Type and Reason for Visit: Reassess    Nutrition Recommendations/Plan:   Continue w/ PO diet order  Monitor % meal intake. Monitor need for ONS- recommend Nepro if needed. Malnutrition Assessment:  Malnutrition Status: At risk for malnutrition (specify) (11/18/22 1153)      Nutrition Assessment:    Admitted with NAPOLEON on CKD, generalized ascites s/p paracentesis removed 3.2L, severe aortic stenosis, thrombocytopenia. Restarted on PO diet 11/18 (regular+cardiac restriction). Awaiting kidnet biopsy. pt with lunch tray at bedside (~75%). Finding food items pt will eat such as soups. Nutrition Related Findings:    BM 11/19. K 3.4, , creatinine 6.39, GFR 8. promacta, ferrous sulfate, humalog, floranex. Current Nutrition Intake & Therapies:  Average Meal Intake: 51-75%     ADULT DIET Regular; Low Fat/Low Chol/High Fiber/MATTHEW    Anthropometric Measures:  Height: 6' (182.9 cm)  Ideal Body Weight (IBW): 178 lbs (81 kg)     Current Body Wt:  122.5 kg (270 lb) (11/20/22), 152.6 % IBW. Current BMI (kg/m2): 36.6  Usual Body Weight: 122.9 kg (271 lb) (10/2022)  % Weight Change (Calculated): 0.2  Weight Adjustment: No adjustment                 BMI Category: Obese class 2 (BMI 35.0-39. 9)    Estimated Daily Nutrient Needs:  Energy Requirements Based On: Formula  Weight Used for Energy Requirements: Current  Energy (kcal/day): 2387  Weight Used for Protein Requirements: Current (0.8g r/t renal function)  Protein (g/day): 98  Method Used for Fluid Requirements: 1 ml/kcal  Fluid (ml/day): 4152    Nutrition Diagnosis:   Predicted inadequate energy intake related to acute injury/trauma as evidenced by intake 51-75%    Nutrition Interventions:   Food and/or Nutrient Delivery: Continue current diet  Nutrition Education/Counseling: No recommendations at this time  Coordination of Nutrition Care: Continue to monitor while inpatient, Interdisciplinary rounds       Goals:  Previous Goal Met: Goal(s) achieved  Goals: PO intake 75% or greater, by next RD assessment       Nutrition Monitoring and Evaluation:   Behavioral-Environmental Outcomes: None identified  Food/Nutrient Intake Outcomes: Food and nutrient intake  Physical Signs/Symptoms Outcomes: Biochemical data, Meal time behavior, Nutrition focused physical findings, Skin, Weight, GI status    Discharge Planning:    Continue current diet    Lucina Landon RD

## 2022-11-21 NOTE — HOME CARE
Thank you for Referral Bariatric Walker ordered through [address] RN  [address] [facility] Nurse Liaison   Office [phone]

## 2022-11-21 NOTE — PROGRESS NOTES
Problem: Mobility Impaired (Adult and Pediatric)  Goal: *Acute Goals and Plan of Care (Insert Text)  Description: Physical Therapy Goals   Initiated 11/14/2022 and to be accomplished within 7 day(s)  1. Patient will move from supine <> sit with SBA in prep for out of bed activity and change of position. 2.  Patient will perform sit<> stand with SBA with RW in prep for transfers/ambulation. 3.  Patient will ambulate 50 feet with S/RW for improved functional mobility at discharge. 4.  Patient will ascend/descend 1stairs with handrail(s) with minimal assistance/contact guard assist for home re-entry as needed. Outcome: Progressing Towards Goal   []  Patient has met MD mobilization lilo for d/c home   []  Recommend [facility] with 24 hour adult care   []  Benefit from additional acute PT session to address:      PHYSICAL THERAPY TREATMENT    Patient: Florentin Anderson (78 y.o. male)  Date: 11/20/2022  Diagnosis: Hypoxia [R09.02]  NAPOLEON (acute kidney injury) ([address] [N17.9]  Aortic stenosis [I35.0]  Peripheral edema [R60.9] NAPOLEON (acute kidney injury) ([address]      Precautions: Fall      ASSESSMENT:  Pt and wife report interest in retuning to home at d/c. Function continues to improve, but pt has 2 slight LOB episode with direction change and required 3 attempt to complete initial standing transfer. Progression toward goals: Fair  []      Improving appropriately and progressing toward goals  [x]      Improving slowly and progressing toward goals  []      Not making progress toward goals and plan of care will be adjusted     PLAN:  Patient continues to benefit from skilled intervention to address the above impairments. Continue treatment per established plan of care.   Discharge Recommendations: Home Physical Therapy vs SNF  Further Equipment Recommendations for Discharge:  rolling walker    AMPAC: 15    This AMPAC score should be considered in conjunction with interdisciplinary team recommendations to determine the most appropriate discharge setting. Patient's social support, diagnosis, medical stability, and prior level of function should also be taken into consideration. SUBJECTIVE:   Patient stated Ashu Marshall got up with your friend earlier.  (OT)    OBJECTIVE DATA SUMMARY:   Critical Behavior:  Neurologic State: Alert, Appropriate for age  Orientation Level: Oriented X4  Cognition: Appropriate decision making, Appropriate for age attention/concentration  Safety/Judgement: Fall prevention  Functional Mobility Training:  Bed Mobility:  Rolling: Stand-by assistance  Supine to Sit: Stand-by assistance; Additional time  Sit to Supine: Contact guard assistance  Scooting: Stand-by assistance; Additional time  Transfers:  Sit to Stand: Contact guard assistance;Stand-by assistance  Stand to Sit: Contact guard assistance;Stand-by assistance  Balance:  Sitting: Intact  Standing: Intact; With support  Standing - Static: Good  Standing - Dynamic : Fair  Ambulation/Gait Training:  Distance (ft): 200 Feet (ft)  Assistive Device: Gait belt;Walker, rolling  Ambulation - Level of Assistance: Contact guard assistance  Gait Abnormalities: Decreased step clearance  Base of Support: Widened  Speed/Sandra: Slow  Therapeutic Exercises:       EXERCISE   Sets   Reps   Active Active Assist   Passive Self ROM   Comments   Ankle Pumps    [] [] [] []    Quad Sets/Glut Sets    [] [] [] [] Hold for 5 secs   Hamstring Sets   [] [] [] []    Short Arc Quads   [] [] [] []    Heel Slides   [] [] [] []    Straight Leg Raises   [] [] [] []    Hip Add 1 10 [x] [] [] [] Hold for 5 secs, w/ pillow squeeze   Long Arc Quads 1 10 [x] [] [] []    Seated Marching   [] [] [] []    Standing Marching   [] [] [] []    Sit to stand  1 5 [x] [] [] []        Pain:  Pain level pre-treatment: 0/10  Pain level post-treatment: 0/10   Pain Intervention(s): Medication (see MAR);  Rest, Ice, Repositioning   Response to intervention: Nurse notified, See doc flow    Activity Tolerance: fair  Please refer to the flowsheet for vital signs taken during this treatment. After treatment:   [x] Patient left in no apparent distress sitting EOB  [] Patient left in no apparent distress in bed  [x] Call bell left within reach  [x] Nursing notified  [] Caregiver present  [] Bed alarm activated  [] SCDs applied      COMMUNICATION/EDUCATION:   [x]         Role of Physical Therapy in the acute care setting. [x]         Fall prevention education was provided and the patient/caregiver indicated understanding. [x]         Patient/family have participated as able in working toward goals and plan of care. [x]         Patient/family agree to work toward stated goals and plan of care. []         Patient understands intent and goals of therapy, but is neutral about his/her participation. []         Patient is unable to participate in stated goals/plan of care: ongoing with therapy staff.  []         Other:        Jonatan Riddle PTA   Time Calculation: 41 mins    [employer] AM-PAC® Basic Mobility Inpatient Short Form (6-Clicks) Version 2    How much HELP from another person does the patient currently need    (If the patient hasn't done an activity recently, how much help from another person do you think he/she would need if he/she tried?)   Total (Total A or Dep)   A Lot  (Mod to Max A)   A Little (Sup or Min A)   None (Mod I to I)   Turning from your back to your side while in a flat bed without using bedrails? [] 1 [] 2 [x] 3 [] 4   2. Moving from lying on your back to sitting on the side of a flat bed without using bedrails? [] 1 [] 2 [x] 3 [] 4   3. Moving to and from a bed to a chair (including a wheelchair)? [] 1 [] 2 [x] 3 [] 4   4. Standing up from a chair using your arms (e.g., wheelchair, or bedside chair)? [] 1 [] 2 [x] 3 [] 4   5. Walking in hospital room? [] 1 [] 2 [x] 3 [] 4   6.  Climbing 3-5 steps with a railing?+   [] 1 [] 2 [] 3 [] 4   +If stair climbing cannot be assessed, skip item #6. Sum responses from items 1-5. Based on an AM-PAC score of 15/24 (or 15/20 if omitting stairs) and their current functional mobility deficits, it is recommended that the patient have 2-3 sessions per week of Physical Therapy at d/c to increase the patient's independence.

## 2022-11-21 NOTE — PROGRESS NOTES
Patient:  Rajinder Blevins :  1940  Gender:  male  MRN #:  [id number]    Assessment:     Rajinder Blevins is a 78y.o. year old male with ongoing CKD baseline cr of 2.3 range. Has been admitted with anasarca, fluid weight gain  over 2 weeks  He developed acute renal failure with progressive rise in creatinine of unknown etiology   CT scan of abdomen showed : cirrhotic morphology with moderate ascites , most likely from portal hypertension , no evidence of SBP   Does not have significant hematuria and proteinuria to suspect GN     USG is negative for obstructive uropathy   Renal artery doppler is inconclusive due to body habitus/bowel gas.    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 , full report to follow     Acute renal failure on CKD stage 3G  Thrombocytopenia   Anemia   Cirrhosis of Liver  Ascites  UTI      Plan:   - He has significant ATN in renal biopsy which most likely due to hemodynamics changes due to cirrhosis  ascites/third spacing of fluid leading to intravascular volume depletion and antibiotics also cause ATN - there is slim hope to recover this ATN but is difficult to ascertain time line   Apart from that 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 which most likely secondary to cirrhosis and crohn's disease, does not have crescent and classing findings of IgA nephropathy , oxalate crystal is due to Crohn's diease and recent antibiotics       He has decent  urine output overnight without diuretics, creatinine and BUN is still significantly elevated but now down trending , no urgent need of dialysis for  now   If not much improvement in renal function in next 24-48 hours on iv albumin will start dialysis , increase iv albumin to 25 gram q 6 hourly   - Hold off diuretics  - Hep B surface antigen/e Antigen/Hep C antibody is  negative, SHIVANI- negative, ANCA- negatve, SPEP- pending  Check urine electrolytes again today      Less than 2 gram salt   Intake and output   Dose all meds for current EGFR     Discussed with the patient and his wife in detail about dialysis and renal prognosis          Subjective/Interval Events    Says he is good , no shortness of breath, nausea, vomiting, loss of appetite, confusion   Urinating fine     Blood pressure 93/62, pulse 93, temperature 98 °F (36.7 °C), resp. rate 20, height 6' (1.829 m), weight 122.5 kg (270 lb), SpO2 98 %. Exam:    Pt awake and alert  Lung: clear to auscultation  Heart: s1s2 regualr no rubs or murmur  normal bowel sounds. Ext: 2 + edema   CNS- Oriented to time , place and person       Recent Labs     11/21/22  0515   WBC 1.6*   HCT 23.7*   .4*       Recent Labs     11/21/22  0515      CO2 24   *       No results for input(s): ALBUMIN, AGRAT in the last 72 hours. No lab exists for component: TOTPR, SGOTAST, ALKPHOS, BILIT, BILID, SGPTALT, GLOBULIN    USG retroperitoneum: - Increased left and right renal cortical echogenicity, suggestive of chronic  medical renal disease. No obstructive uropathy or acute sonographic  abnormality. Mild dependent changes of atelectasis and peribronchial areas of groundglass  density which may reflect a manifestation of small airways disease or  potentially alveolar edema    > No evidence of pleural effusion. 2. Within the abdomen/pelvis:     > Cirrhotic hepatic morphology and signal of portal hypertension with small  moderate amount of ascites.     > No abnormal bowel wall thickening or inflammatory changes. Prior ileocolonic  resection and reanastomosis. CT abdomen/pelvis: -     Renal artery doppler: 11/16/22:- Inadequate study secondary to body habitus and increase bowel gas. Unable to document any color flow on left kidney and renal artery bilaterally.     Medical Decision Making :    I have reviewed patient's record for pertinent labs, radiology and other test . I have reviewed old records. I have ordered labs, medications and radiology as necessary and indicated per patient's condition . Total time spent is approximately 31 minutes. Greater than 50 % of that time was spent in counseling and or care coordination.      Marylu Denny MD  Nephrology -[place] [id number]

## 2022-11-21 NOTE — PROGRESS NOTES
OCCUPATIONAL THERAPY TREATMENT    Problem: Self Care Deficits Care Plan (Adult)  Goal: *Acute Goals and Plan of Care (Insert Text)  Description: Occupational Therapy Goals  Initiated 11/15/2022 within 7 day(s). 1.  Patient will perform grooming with minimal assistance/contact guard assist standing at sink for 5 minutes or more. 2.  Patient will perform upper body dressing with supervision/set-up. 3.  Patient will perform lower body dressing with moderate assistance . 4. Patient will perform toilet transfers with minimal assistance/contact guard assist.  5.  Patient will perform all aspects of toileting with minimal assistance/contact guard assist.  6.  Patient will participate in upper extremity therapeutic exercise/activities with supervision/set-up for 10 minutes. 7.  Patient will utilize energy conservation techniques during functional activities with verbal, visual, and tactile cues. Outcome: Progressing Towards Goal     Patient: Bob Ceja (78 y.o. male)  Date: 11/21/2022  Diagnosis: Hypoxia [R09.02]  NAPOLEON (acute kidney injury) ([facility] [N17.9]  Aortic stenosis [I35.0]  Peripheral edema [R60.9] NAPOLEON (acute kidney injury) ([facility] [facility]      Precautions: Fall      Chart, occupational therapy assessment, plan of care, and goals were reviewed. ASSESSMENT:  Pt presents sitting EOB,, indicating he would like to go home. He has been making improvements in activity tolerance, strength, and balance. Pt is able to perform sit to stand with CGA for 5x in preparation for movement. Work on forward reaching in sitting and standing, pt is able to perform but still needs assistance with sock don. Pt ambulates to bathroom, better able to use toilet tongs with wet wipes vs toilet paper with toilet tongs but still needs min a for clean up. Ambulates back to bed, no fatigue noted. Pt would most likely be [facility] therapy at this time.    Progression toward goals:  [x]          Improving appropriately and progressing toward goals  []          Improving slowly and progressing toward goals  []          Not making progress toward goals and plan of care will be adjusted     PLAN:  Patient continues to benefit from skilled intervention to address the above impairments. Continue treatment per established plan of care. Discharge Recommendations:  Home Health  Further Equipment Recommendations for Discharge:  Shower chair,      SUBJECTIVE:   Patient stated I would like to go home .     OBJECTIVE DATA SUMMARY:   Cognitive/Behavioral Status:  Neurologic State: Alert, Appropriate for age  Orientation Level: Oriented X4  Cognition: Appropriate decision making, Appropriate for age attention/concentration  Safety/Judgement: Fall prevention    Functional Mobility and Transfers for ADLs:   Bed Mobility:     Supine to Sit: Stand-by assistance  Sit to Supine: Stand-by assistance      Transfers:  Sit to Stand: Contact guard assistance                Toilet Transfer : Contact guard assistance           Bathroom Mobility: Contact guard assistance        Balance:  Sitting: Intact  Standing: Intact; With support    ADL Intervention:         Toileting  Toileting Assistance: Minimum assistance  Bladder Hygiene: Minimum assistance  Bowel Hygiene: Minimum assistance  Clothing Management: Minimum assistance  Adaptive Equipment: Toilet tongs    Cognitive Retraining  Problem Solving: General alternative solution  Executive Functions: Managing time;Regulating behavior  Safety/Judgement: Fall prevention        Pain:  Pain level pre-treatment: 0/10   Pain level post-treatment: 0/10  Pain Intervention(s): Medication administered by RN (see MAR); Rest, Ice, Repositioning   Response to intervention: Nurse notified, See doc flow sheet    Activity Tolerance:    Continues to make improveents, no fatigue noted after standing/bathroom tasks     Please refer to the flowsheet for vital signs taken during this treatment.   After treatment:   []  Patient left in no apparent distress sitting up in chair  []  Patient left in no apparent distress in bed  [x]  Call bell left within reach  [x]  Nursing notified  []  Caregiver present  []  Bed alarm activated    COMMUNICATION/EDUCATION:   [x] Role of Occupational Therapy in the acute care setting  [x] Home safety education was provided and the patient/caregiver indicated understanding. [x] Patient/family have participated as able in working towards goals and plan of care. [x] Patient/family agree to work toward stated goals and plan of care. [] Patient understands intent and goals of therapy, but is neutral about his/her participation. [] Patient is unable to participate in goal setting and plan of care.       Thank you for this referral.  Ludin Justin OTD, OTR/L   Time Calculation: 29 mins

## 2022-11-21 NOTE — PROGRESS NOTES
2100 Pt requested to have his medications at midnight.    0600 Pt lost PIV called Thiago Todd, to assist with ultrasound guided IV.

## 2022-11-21 NOTE — PROGRESS NOTES
[facility], MD, FACP, Rufino Nyasia Hutch[place], [place]      Bjorn TL Ruvalcaba, [facility] Hospital[facility]-BC   Janicesigifredo Ha, [facility]-   Lenore Miramontes, FNDEANNA Perez, PCNP-BC      [address]   at [address], [address]   [phone]   FAX: [phone]  [facility] [facility]   at [address], [address], [address]   [phone]   FAX: [phone]         HEPATOLOGY PROGRESS NOTE  The patient is a 78 y.o.  male without a history of chronic liver disease. He was hospitalized for progressive increase in lower extremity edema and found to have NAPOLEON, thrombocytopenia and cirrhosis. In the ED Laboratory studies were significant for:    WBC 2.5 , HB 11.0 gms, PLT 65, Sna 139, Scr 5.92 mg, AST 27, ALT 38, ALP 96, TBILI 1.9 mg,     CT scan of the abdomen demonstrated changes consistent with cirrhosis. No liver masses. Moderate ascites      An ECHO of heart demonstrated normal LVEF 65-70%, normal PAP, normal RV, trace TR. Hospital Course:  Paracentesis with analysis of ascites shows Cell count is negative for SBP. SAAG of 2.0-0.6= 1.4   Serology for all causes of chronic liver disease are negative  Evaluation of NAPOLEON is not consistent with nephrotic syndrome as etiology for ascites. REnal biopsy performed and results pending     Hepatology Plan:  Hepatic venous pressure measurements and transjugular liver biopsy on Tuesday      ASSESSMENT AND PLAN:  Cirrhosis  The diagnosis of cirrhosis is based upon imaging, laboratory studies, complications of cirrhosis. Cirrhosis is of unclear etiology. The patient has normal/near normal liver function. The patient has never developed any complications of cirrhosis to date. The CTP is 7.   Child class B. The MELD score is 12. Serologic testing for causes of chronic liver disease was negative. Suspect he has cirrhosis due to WALALCE. Will proceed with hepatic venous pressures and transjugular liver biopsy to confirm cirrhosis    Ascites   Ascites developed for the first time in 11/2022. This is probably due to a combination of cirrhosis and NAPOLEON. Cirrhosis was likely well compensated for years without ascites until CKD worsened. Cannot use diuretics because of NAPOLEON    Lower extremity edema  Edema is severe 4+ and due to a combination of cirrhosis and NAPOLEON. Acute kidney injury superimposed upon CKD  The patient has developed an elevation in serum creatinine to 6.4 mg  NAPOLEON is secondary to worsening CKD. Renal biopsy performed last Friday. Results pending. Screening for Esophageal varices   The patient has not had an EGD to screen for varices. Will schedule for EGD to assess for varices on Wednesday. NPO after MN on Tuesday    Anemia   This is due to multifactorial causes including portal hypertension with chronic GI blood loss,   The HB on admission was 11 gms. This has drifted down during the hositalization. There is no evidence of active GI blood loss. Will perform EGD Monday AM.    Thrombocytopenia   This is secondary to cirrhosis. There is no evidence of overt bleeding. No treatment is required. He has been started on promacta to raise the PLT count  The platelet count is adequate for the patient to undergo procedures without the need for platelet transfusion or platelet growth factors as long sa this is over 50K. PHYSICAL EXAMINATION:  VS: per nursing note  General:  No acute distress. Eyes:  Sclera anicteric. ENT:  No oral lesions. Thyroid normal.  Nodes:  No adenopathy. Skin:  No spider angiomata. No jaundice. Ecchymosis  Respiratory:  Lungs clear to auscultation. Cardiovascular:  Regular heart rate. Abdomen:  Distended. Tight.   Ascites appears to be present. Extremities:  4+ lower extremity edema. Neurologic:  Alert and oriented. Cranial nerves grossly intact. No asterixis. LABORATORY:   Latest Reference Range & Units 11/17/22 03:15 11/18/22 04:45 11/19/22 06:07 11/20/22 01:17 11/21/22 05:15   WBC 4.6 - 13.2 K/uL 2.6 (L) 2.1 (L) 1.9 (L) 1.7 (L) 1.6 (L)   HGB 13.0 - 16.0 g/dL 9.2 (L) 9.0 (L) 8.3 (L) 8.2 (L) 7.9 (L)   PLATELET 230 - 222 K/uL 64 (L) 52 (L) 57 (L) 54 (L) 54 (L)   Sodium 136 - 145 mmol/L 138 139 139 142 141   Potassium 3.5 - 5.5 mmol/L 3.1 (L) 3.4 (L) 3.0 (L) 3.2 (L) 3.4 (L)   Chloride 100 - 111 mmol/L 103 102 103 106 105   CO2 21 - 32 mmol/L 23 23 23 23 24   Glucose 74 - 99 mg/dL 119 (H) 133 (H) 131 (H) 152 (H) 116 (H)   BUN 7.0 - 18 MG/ (H) 114 (H) 116 (H) 119 (H) 109 (H)   Creatinine 0.6 - 1.3 MG/DL 6.88 (H) 7.05 (H) 7.09 (H) 6.89 (H) 6.39 (H)   Bilirubin, total 0.2 - 1.0 MG/DL 1.2 (H)       Albumin 3.4 - 5.0 g/dL 2.0 (L)    2.9 (L)   ALT 16 - 61 U/L 25       AST 10 - 38 U/L 24       Alk.  phosphatase 45 - 117 U/L 75       (L): Data is abnormally low  (H): Data is abnormally high      Brooke De MD  [address] of [facility]  [address] [facility], [address]  [phone]  FAX: [phone]  [facility] [address]

## 2022-11-21 NOTE — PROGRESS NOTES
Problem: Risk for Spread of Infection  Goal: Prevent transmission of infectious organism to others  Description: Prevent the transmission of infectious organisms to other patients, staff members, and visitors. Outcome: Progressing Towards Goal     Problem: Falls - Risk of  Goal: *Absence of Falls  Description: Document Ramona Counts Fall Risk and appropriate interventions in the flowsheet.   Outcome: Progressing Towards Goal  Note: Fall Risk Interventions:  Mobility Interventions: Assess mobility with egress test, Bed/chair exit alarm, Communicate number of staff needed for ambulation/transfer, Patient to call before getting OOB, PT Consult for mobility concerns, Utilize walker, cane, or other assistive device         Medication Interventions: Bed/chair exit alarm, Evaluate medications/consider consulting pharmacy, Patient to call before getting OOB, Teach patient to arise slowly    Elimination Interventions: Bed/chair exit alarm, Call light in reach, Patient to call for help with toileting needs, Stay With Me (per policy), Toilet paper/wipes in reach, Toileting schedule/hourly rounds

## 2022-11-22 ENCOUNTER — ANESTHESIA EVENT (OUTPATIENT)
Dept: ENDOSCOPY | Age: 82
DRG: 682 | End: 2022-11-22
Payer: MEDICARE

## 2022-11-22 LAB
ANION GAP SERPL CALC-SCNC: 10 MMOL/L (ref 3–18)
BASOPHILS # BLD: 0 K/UL (ref 0–0.1)
BASOPHILS NFR BLD: 1 % (ref 0–2)
BUN SERPL-MCNC: 101 MG/DL (ref 7–18)
BUN/CREAT SERPL: 16 (ref 12–20)
CALCIUM SERPL-MCNC: 8.4 MG/DL (ref 8.5–10.1)
CHLORIDE SERPL-SCNC: 108 MMOL/L (ref 100–111)
CHLORIDE UR-SCNC: 37 MMOL/L (ref 55–125)
CO2 SERPL-SCNC: 23 MMOL/L (ref 21–32)
CREAT SERPL-MCNC: 6.24 MG/DL (ref 0.6–1.3)
DIFFERENTIAL METHOD BLD: ABNORMAL
EOSINOPHIL # BLD: 0.1 K/UL (ref 0–0.4)
EOSINOPHIL NFR BLD: 5 % (ref 0–5)
ERYTHROCYTE [DISTWIDTH] IN BLOOD BY AUTOMATED COUNT: 13.8 % (ref 11.6–14.5)
GLUCOSE BLD STRIP.AUTO-MCNC: 124 MG/DL (ref 70–110)
GLUCOSE BLD STRIP.AUTO-MCNC: 138 MG/DL (ref 70–110)
GLUCOSE BLD STRIP.AUTO-MCNC: 152 MG/DL (ref 70–110)
GLUCOSE BLD STRIP.AUTO-MCNC: 237 MG/DL (ref 70–110)
GLUCOSE SERPL-MCNC: 118 MG/DL (ref 74–99)
HCT VFR BLD AUTO: 21.3 % (ref 36–48)
HGB BLD-MCNC: 7.1 G/DL (ref 13–16)
IMM GRANULOCYTES # BLD AUTO: 0 K/UL (ref 0–0.04)
IMM GRANULOCYTES NFR BLD AUTO: 1 % (ref 0–0.5)
LYMPHOCYTES # BLD: 0.3 K/UL (ref 0.9–3.6)
LYMPHOCYTES NFR BLD: 18 % (ref 21–52)
MCH RBC QN AUTO: 34.8 PG (ref 24–34)
MCHC RBC AUTO-ENTMCNC: 33.3 G/DL (ref 31–37)
MCV RBC AUTO: 104.4 FL (ref 78–100)
MONOCYTES # BLD: 0.2 K/UL (ref 0.05–1.2)
MONOCYTES NFR BLD: 10 % (ref 3–10)
NEUTS SEG # BLD: 1 K/UL (ref 1.8–8)
NEUTS SEG NFR BLD: 66 % (ref 40–73)
NRBC # BLD: 0 K/UL (ref 0–0.01)
NRBC BLD-RTO: 0 PER 100 WBC
PLATELET # BLD AUTO: 50 K/UL (ref 135–420)
PMV BLD AUTO: 10.8 FL (ref 9.2–11.8)
POTASSIUM SERPL-SCNC: 3.1 MMOL/L (ref 3.5–5.5)
RBC # BLD AUTO: 2.04 M/UL (ref 4.35–5.65)
SODIUM SERPL-SCNC: 141 MMOL/L (ref 136–145)
SODIUM UR-SCNC: 52 MMOL/L (ref 20–110)
WBC # BLD AUTO: 1.5 K/UL (ref 4.6–13.2)

## 2022-11-22 PROCEDURE — 80048 BASIC METABOLIC PNL TOTAL CA: CPT

## 2022-11-22 PROCEDURE — 74011250636 HC RX REV CODE- 250/636: Performed by: STUDENT IN AN ORGANIZED HEALTH CARE EDUCATION/TRAINING PROGRAM

## 2022-11-22 PROCEDURE — 74011250637 HC RX REV CODE- 250/637: Performed by: INTERNAL MEDICINE

## 2022-11-22 PROCEDURE — 82436 ASSAY OF URINE CHLORIDE: CPT

## 2022-11-22 PROCEDURE — 74011250637 HC RX REV CODE- 250/637: Performed by: HOSPITALIST

## 2022-11-22 PROCEDURE — C9399 UNCLASSIFIED DRUGS OR BIOLOG: HCPCS | Performed by: STUDENT IN AN ORGANIZED HEALTH CARE EDUCATION/TRAINING PROGRAM

## 2022-11-22 PROCEDURE — 65270000046 HC RM TELEMETRY

## 2022-11-22 PROCEDURE — 74011250636 HC RX REV CODE- 250/636: Performed by: INTERNAL MEDICINE

## 2022-11-22 PROCEDURE — 84300 ASSAY OF URINE SODIUM: CPT

## 2022-11-22 PROCEDURE — 97530 THERAPEUTIC ACTIVITIES: CPT

## 2022-11-22 PROCEDURE — 82962 GLUCOSE BLOOD TEST: CPT

## 2022-11-22 PROCEDURE — 74011000250 HC RX REV CODE- 250: Performed by: RADIOLOGY

## 2022-11-22 PROCEDURE — 74011636637 HC RX REV CODE- 636/637: Performed by: INTERNAL MEDICINE

## 2022-11-22 PROCEDURE — 74011000258 HC RX REV CODE- 258: Performed by: INTERNAL MEDICINE

## 2022-11-22 PROCEDURE — 85025 COMPLETE CBC W/AUTO DIFF WBC: CPT

## 2022-11-22 PROCEDURE — 36415 COLL VENOUS BLD VENIPUNCTURE: CPT

## 2022-11-22 RX ORDER — POTASSIUM CHLORIDE 20 MEQ/1
40 TABLET, EXTENDED RELEASE ORAL
Status: COMPLETED | OUTPATIENT
Start: 2022-11-22 | End: 2022-11-22

## 2022-11-22 RX ADMIN — SODIUM CHLORIDE, PRESERVATIVE FREE 10 ML: 5 INJECTION INTRAVENOUS at 23:09

## 2022-11-22 RX ADMIN — ALBUMIN HUMAN 25 G: 0.25 SOLUTION INTRAVENOUS at 00:10

## 2022-11-22 RX ADMIN — Medication 2 TABLET: at 20:36

## 2022-11-22 RX ADMIN — ALBUMIN HUMAN 25 G: 0.25 SOLUTION INTRAVENOUS at 14:45

## 2022-11-22 RX ADMIN — Medication 2 UNITS: at 23:08

## 2022-11-22 RX ADMIN — Medication 2 TABLET: at 10:57

## 2022-11-22 RX ADMIN — FERROUS SULFATE TAB 325 MG (65 MG ELEMENTAL FE) 325 MG: 325 (65 FE) TAB at 17:43

## 2022-11-22 RX ADMIN — SODIUM CHLORIDE, PRESERVATIVE FREE 10 ML: 5 INJECTION INTRAVENOUS at 05:53

## 2022-11-22 RX ADMIN — CEFTRIAXONE 1 G: 1 INJECTION, POWDER, FOR SOLUTION INTRAMUSCULAR; INTRAVENOUS at 06:39

## 2022-11-22 RX ADMIN — SODIUM CHLORIDE, PRESERVATIVE FREE 10 ML: 5 INJECTION INTRAVENOUS at 14:00

## 2022-11-22 RX ADMIN — POTASSIUM CHLORIDE 40 MEQ: 1500 TABLET, EXTENDED RELEASE ORAL at 10:56

## 2022-11-22 RX ADMIN — FERROUS SULFATE TAB 325 MG (65 MG ELEMENTAL FE) 325 MG: 325 (65 FE) TAB at 10:57

## 2022-11-22 RX ADMIN — ALBUMIN HUMAN 25 G: 0.25 SOLUTION INTRAVENOUS at 20:35

## 2022-11-22 RX ADMIN — DOXAZOSIN MESYLATE 4 MG: 4 TABLET ORAL at 21:08

## 2022-11-22 RX ADMIN — ALBUMIN HUMAN 25 G: 0.25 SOLUTION INTRAVENOUS at 05:55

## 2022-11-22 RX ADMIN — CETIRIZINE HYDROCHLORIDE 10 MG: 10 TABLET, FILM COATED ORAL at 21:08

## 2022-11-22 NOTE — PROGRESS NOTES
Called pharmacy at 11:22 am to advise that albumin needed restocking. Was informed will restock. Called at 13:20 to have albumin restocked, administration is late from the 12 pm dose.

## 2022-11-22 NOTE — PROGRESS NOTES
D/C Plan: Home with [facility] and bariatric RW, pending medically ready    CM rounded with physician. CM notes hepatology is recommending a liver biopsy and the patient and family as still deciding on HD. CM notes that per physician note, the patient states he will agree to dialysis if needed. CM to deliver bariatric RW to patient today and complete ppwk. RW delivered. Care Management Interventions  PCP Verified by CM:  Yes  Mode of Transport at Discharge: Self  Transition of Care Consult (CM Consult): [address]: Yes  Physical Therapy Consult: Yes  Occupational Therapy Consult: Yes  Support Systems: Spouse/Significant Other  Confirm Follow Up Transport: Family  The Plan for Transition of Care is Related to the Following Treatment Goals : acute renal failure  The Patient and/or Patient Representative was Provided with a Choice of Provider and Agrees with the Discharge Plan?: Yes  Name of the Patient Representative Who was Provided with a Choice of Provider and Agrees with the Discharge Plan: Jose Burt, patient  Freedom of Choice List was Provided with Basic Dialogue that Supports the Patient's Individualized Plan of Care/Goals, Treatment Preferences and Shares the Quality Data Associated with the Providers?: Yes  Discharge Location  Patient Expects to be Discharged to[de-identified] Home with home health

## 2022-11-22 NOTE — PROGRESS NOTES
Bedside, Verbal, and Written shift change report given to Arvind Devries RN (oncoming nurse) by [facility], RN (offgoing nurse). Report included the following information SBAR, Kardex, and MAR.

## 2022-11-22 NOTE — PROGRESS NOTES
Cardiology Progress Note        Patient: José Manuel Russell Sex: male          DOA: 11/12/2022  YOB: 1940      Age:  78 y.o.        LOS:  LOS: 10 days     Patient seen and examined, chart reviewed. Assessment/Plan     Patient Active Problem List   Diagnosis Code    Altered mental status T69.95    Acute metabolic encephalopathy Q24.82    Thrombocytopenia (HCC) D69.6    Aortic stenosis I35.0    CKD (chronic kidney disease) N18.9    Acute kidney injury superimposed on chronic kidney disease ([address] N17.9, N18.9    Peripheral edema R60.9    Hypoxia R09.02    NAPOLEON (acute kidney injury) ([address] N17.9       Acute on chronic renal failure   Nonrheumatic aortic stenosis   Bilateral lower extremity leg edema   Abnormal troponin no clear evidence of acute coronary syndrome, could be demand ischemia     Echocardiogram was done in 10/2022 revealed       Left Ventricle: Normal left ventricular systolic function with a visually estimated EF of 60 - 65%. Left ventricle size is normal. Mildly increased wall thickness. Findings consistent with mild concentric hypertrophy. Normal wall motion. Grade I diastolic dysfunction present with normal LV EF. Left Atrium: Left atrium is mildly dilated. Aortic Valve: Tricuspid valve. Mildly calcified cusp. Mild regurgitation. Moderate stenosis of the aortic valve. Tricuspid Valve: Unable to assess RVSP due to inadequate or insignificant tricuspid regurgitation. Interatrial Septum: No interatrial shunt visualized with color Doppler. Suboptimal bubble study. Agitated saline study was negative with and without provocation. Echocardiogram done on 11/16/2022 revealed       Left Ventricle: Normal left ventricular systolic function with a visually estimated EF of 60 - 65%. Left ventricle size is normal. Increased wall thickness. Mild septal thickening. Normal wall motion. Grade I diastolic dysfunction present with normal LV EF.     Aortic Valve: Mild regurgitation. Mild stenosis of the aortic valve. Mitral Valve: Moderate annular calcification at the anterior and posterior leaflets of the mitral valve. Mild stenosis noted. Tricuspid Valve: Unable to assess RVSP due to inadequate or insignificant tricuspid regurgitation. CT chest/abdomen/pelvis reported    1. Within the chest:     > Mild dependent changes of atelectasis and peribronchial areas of groundglass density which may reflect a manifestation of small airways disease or potentially alveolar edema    > No evidence of pleural effusion. 2. Within the abdomen/pelvis:     > Cirrhotic hepatic morphology and signal of portal hypertension with small moderate amount of ascites.     > No abnormal bowel wall thickening or inflammatory changes. Prior ileocolonic resection and reanastomosis. > Cholelithiasis without evidence of cholecystitis.     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     > Extensive body wall edema.     > No evidence of obstructive uropathy. Urinary bladder decompressed with Gillis catheter in situ. Bilateral lower extremity venous duplex reported  No evidence of deep vein thrombosis in the right lower extremity veins assessed. No evidence of deep vein thrombosis in the left lower extremity veins assessed. Plan:     Continue IV Lasix and metolazone. Strict input output. Patient's volume overload with worsening of renal function is out of proportion to the echocardiographic finding normal LVEF and normal RV function with grade I diastolic dysfunction  Monitor renal function and electrolytes. Plan discussed with patient and family member at bed side. Call on-call cardiologist if needed           11/22/2022  I was called to see this patient and cleared him for liver biopsy.   Patient denied any chest pain or shortness of breath  Cardiac telemetry stable sinus rhythm with occasional PAC, chronic right bundle branch block  Clinically no evidence of ACS/CHF, unstable arrhythmia or significant valvular heart disease. Patient is a acceptable risk from cardiac standpoint for liver biopsy  Discussed with patient. Please call if you have any questions or concerns. Subjective:    cc: My breathing is little better, leg swelling      REVIEW OF SYSTEMS:     General: No fevers or chills. Cardiovascular: No chest pain,No palpitations, No orthopnea, No PND,  positive leg swelling, No claudication  Pulmonary: No shortness of breath. Gastrointestinal: No nausea, vomiting, bleeding  Neurology: No Dizziness    Objective:      Visit Vitals  /61   Pulse 80   Temp 98 °F (36.7 °C)   Resp 18   Ht 6' (1.829 m)   Wt 122.5 kg (270 lb)   SpO2 100%   BMI 36.62 kg/m²     Body mass index is 36.62 kg/m². Physical Exam:  General Appearance: Comfortable, not using accessory muscles of respiration. HEENT: EVERT. HEAD: Atraumatic  NECK: No JVD, no thyroidomeglay. CAROTIDS: no bruit  LUNGS: Clear bilaterally. HEART: S1+S2 audible, 3/6 systolic murmur in aortic and tricuspid area, no pericardial rub. ABD: Non-tender, BS Audible    EXT: bilateral lower extremity +++ edema, bilateral lower extremity skin discoloration, and no cyanosis. MUSCULOSKELETAL: Grossly no joint deformity.   NEUROLOGICAL: AAO times 3, No motor and sensory deficit    Medication:  Current Facility-Administered Medications   Medication Dose Route Frequency    albumin, human-kjda 25% (ALBUMINEX) intravenous solution 25 g  25 g IntraVENous Q6H    sodium chloride (NS) flush 5-40 mL  5-40 mL IntraVENous Q8H    sodium chloride (NS) flush 5-40 mL  5-40 mL IntraVENous PRN    doxazosin (CARDURA) tablet 4 mg  4 mg Oral QHS    ferrous sulfate tablet 325 mg  1 Tablet Oral BID WITH MEALS    cetirizine (ZYRTEC) tablet 10 mg  10 mg Oral QHS    insulin lispro (HUMALOG) injection   SubCUTAneous AC&HS    glucose chewable tablet 16 g  4 Tablet Oral PRN    glucagon (GLUCAGEN) injection 1 mg  1 mg IntraMUSCular PRN    dextrose 10% infusion 0-250 mL  0-250 mL IntraVENous PRN    eltrombopag (PROMACTA) tablet 50 mg (Patient Supplied)  50 mg Oral ACB    Lactobacillus Acidoph & Bulgar CRESTPeaceHealth) tablet 2 Tablet  2 Tablet Oral BID               Lab/Data Reviewed:       Recent Labs     11/22/22  0540 11/21/22  0515 11/20/22  0117   WBC 1.5* 1.6* 1.7*   HGB 7.1* 7.9* 8.2*   HCT 21.3* 23.7* 24.5*   PLT 50* 54* 54*       Recent Labs     11/22/22  0540 11/21/22  0515 11/20/22  0117    141 142   K 3.1* 3.4* 3.2*    105 106   CO2 23 24 23   * 116* 152*   * 109* 119*   CREA 6.24* 6.39* 6.89*   CA 8.4* 8.6 8.0*         Signed By: Arlin Rico MD     November 22, 2022

## 2022-11-22 NOTE — PROGRESS NOTES
Hospitalist Progress Note    Patient: Lennart Angelucci. MRN: [id number]  CSN: [id number]    YOB: 1940  Age: 78 y.o. Sex: male    DOA: 11/12/2022 LOS:  LOS: 10 days          Chief Complaint:    ARF      Assessment/Plan     NAPOLEON/CKD-due to ATN  S/p biopsy  Generalized ascites s/p para  Severe AS  cirrhosis  Thrombocytopenia, chronic, no significant bleding  pancytopenia  HtN  Uti-finished tx  Hypokalemia-PO Kdur        Liver biopsy discussed by hepatology    Continue monitoring platelets    DNR status    Will agree to dialysis if needed he states     Regular diet    Iron therapy     Dialysis decision being made by family     Disposition :  Patient Active Problem List   Diagnosis Code    Altered mental status Q73.59    Acute metabolic encephalopathy M59.15    Thrombocytopenia (HCC) D69.6    Aortic stenosis I35.0    CKD (chronic kidney disease) N18.9    Acute kidney injury superimposed on chronic kidney disease ([address] N17.9, N18.9    Peripheral edema R60.9    Hypoxia R09.02    NAPOLEON (acute kidney injury) ([facility] N17.9       Subjective:    I feel like swelling is better  Denies nausea, SOB, CP    Review of systems:    Constitutional: denies fevers  Respiratory: denies SOB, cough  Cardiovascular: denies chest pain  Gastrointestinal: denies nausea, vomiting, diarrhea      Vital signs/Intake and Output:  Visit Vitals  BP (!) 92/54 (BP 1 Location: Right upper arm, BP Patient Position: At rest)   Pulse 87   Temp 98.2 °F (36.8 °C)   Resp 18   Ht 6' (1.829 m)   Wt 122.5 kg (270 lb)   SpO2 99%   BMI 36.62 kg/m²     Current Shift:  No intake/output data recorded.   Last three shifts:  11/20 1901 - 11/22 0700  In: 50 [I.V.:50]  Out: 1700 [Urine:1700]    Exam:    General: elderly WM, alert, NAD, OX3  CVS:Regular rate and rhythm, no M/R/G, S1/S2 heard, no thrill  Lungs:Clear to auscultation bilaterally, no wheezes, rhonchi, or rales  Abdomen: Soft, Nontender, No distention, Normal Bowel sounds  Extremities: 2 plus edema LE BL  Neuro:grossly normal , follows commands  Psych:appropriate                Labs: Results:       Chemistry Recent Labs     11/22/22  0540 11/21/22  0515 11/20/22  0117   * 116* 152*    141 142   K 3.1* 3.4* 3.2*    105 106   CO2 23 24 23   * 109* 119*   CREA 6.24* 6.39* 6.89*   CA 8.4* 8.6 8.0*   AGAP 10 12 13   BUCR 16 17 17   ALB  --  2.9*  --       CBC w/Diff Recent Labs     11/22/22  0540 11/21/22  0515 11/20/22  0117   WBC 1.5* 1.6* 1.7*   RBC 2.04* 2.27* 2.34*   HGB 7.1* 7.9* 8.2*   HCT 21.3* 23.7* 24.5*   PLT 50* 54* 54*   GRANS 66 64 68   LYMPH 18* 20* 19*   EOS 5 3 2      Cardiac Enzymes No results for input(s): CPK, CKND1, CESAR in the last 72 hours. No lab exists for component: CKRMB, TROIP   Coagulation No results for input(s): PTP, INR, APTT, INREXT in the last 72 hours. Lipid Panel Lab Results   Component Value Date/Time    Cholesterol, total 102 10/07/2022 03:45 AM    HDL Cholesterol 42 10/07/2022 03:45 AM    LDL, calculated 47.6 10/07/2022 03:45 AM    VLDL, calculated 12.4 10/07/2022 03:45 AM    Triglyceride 62 10/07/2022 03:45 AM    CHOL/HDL Ratio 2.4 10/07/2022 03:45 AM      BNP No results for input(s): BNPP in the last 72 hours.    Liver Enzymes Recent Labs     11/21/22  0515   ALB 2.9*      Thyroid Studies No results found for: T4, T3U, TSH, TSHEXT     Procedures/imaging: see electronic medical records for all procedures/Xrays and details which were not copied into this note but were reviewed prior to creation of Gordon Thorpe MD

## 2022-11-22 NOTE — PROGRESS NOTES
Patient:  Octaviano Dobson :  1940  Gender:  male  MRN #:  [id number]    Assessment:     Octaviano Dobson is a 78y.o. year old male with ongoing CKD baseline cr of 2.3 range. Has been admitted with anasarca, fluid weight gain  over 2 weeks  He developed acute renal failure with progressive rise in creatinine of unknown etiology   CT scan of abdomen showed : cirrhotic morphology with moderate ascites , most likely from portal hypertension , no evidence of SBP   Does not have significant hematuria and proteinuria to suspect GN     USG is negative for obstructive uropathy   Renal artery doppler is inconclusive due to body habitus/bowel gas.    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 which I believe is primary cause here   He should be on low oxalate diet     - There is slim hope to recover this ATN but is difficult to ascertain time line   Apart from that 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 which most likely secondary to cirrhosis and crohn's disease, does not have crescent and classing findings of IgA nephropathy , oxalate crystal is due to Crohn's diease and recent antibiotics       He has decent  urine output overnight without diuretics, creatinine and BUN is still significantly elevated but now down trending , no urgent need of dialysis for  now   If not much improvement in renal function by Friday, will start dialysis , continue  iv albumin to 25 gram q 6 hourly   - Hold off diuretics  - Hep B surface antigen/e Antigen/Hep C antibody is  negative, SHIVANI- negative, ANCA- negatve, SPEP- pending      Less than 2 gram salt   Intake and output   Dose all meds for current EGFR     Discussed with the patient and his wife in detail about dialysis and renal prognosis          Subjective/Interval Events    Says he is good , no shortness of breath, nausea, vomiting, loss of appetite, confusion   Urinating fine     Blood pressure 110/68, pulse 89, temperature 97.9 °F (36.6 °C), resp. rate 18, height 6' (1.829 m), weight 122.5 kg (270 lb), SpO2 99 %. Exam:    Pt awake and alert  Lung: clear to auscultation  Heart: s1s2 regualr no rubs or murmur  normal bowel sounds. Ext: 2 + edema   CNS- Oriented to time , place and person       Recent Labs     11/22/22  0540   WBC 1.5*   HCT 21.3*   .4*       Recent Labs     11/22/22  0540      CO2 23   *       No results for input(s): ALBUMIN, AGRAT in the last 72 hours. No lab exists for component: TOTPR, SGOTAST, ALKPHOS, BILIT, BILID, SGPTALT, GLOBULIN    USG retroperitoneum: - Increased left and right renal cortical echogenicity, suggestive of chronic  medical renal disease. No obstructive uropathy or acute sonographic  abnormality. Mild dependent changes of atelectasis and peribronchial areas of groundglass  density which may reflect a manifestation of small airways disease or  potentially alveolar edema    > No evidence of pleural effusion. 2. Within the abdomen/pelvis:     > Cirrhotic hepatic morphology and signal of portal hypertension with small  moderate amount of ascites.     > No abnormal bowel wall thickening or inflammatory changes. Prior ileocolonic  resection and reanastomosis. CT abdomen/pelvis: -     Renal artery doppler: 11/16/22:- Inadequate study secondary to body habitus and increase bowel gas. Unable to document any color flow on left kidney and renal artery bilaterally.     Medical Decision Making :    I have reviewed patient's record for pertinent labs, radiology and other test . I have reviewed old records. I have ordered labs, medications and radiology as necessary and indicated per patient's condition . Total time spent is approximately 31 minutes. Greater than 50 % of that time was spent in counseling and or care coordination.      Anika Tejeda MD  Nephrology -[facility], [facility].

## 2022-11-23 ENCOUNTER — ANESTHESIA (OUTPATIENT)
Dept: ENDOSCOPY | Age: 82
DRG: 682 | End: 2022-11-23
Payer: MEDICARE

## 2022-11-23 PROBLEM — N18.30 ACUTE RENAL FAILURE SUPERIMPOSED ON STAGE 3 CHRONIC KIDNEY DISEASE (HCC): Status: ACTIVE | Noted: 2022-11-12

## 2022-11-23 PROBLEM — K74.60 CIRRHOSIS (HCC): Status: ACTIVE | Noted: 2022-01-01

## 2022-11-23 PROBLEM — C64.9 PAPILLARY RENAL CELL CARCINOMA (HCC): Status: ACTIVE | Noted: 2022-01-01

## 2022-11-23 LAB
ABO + RH BLD: NORMAL
ANION GAP SERPL CALC-SCNC: 13 MMOL/L (ref 3–18)
BASOPHILS # BLD: 0 K/UL (ref 0–0.1)
BASOPHILS NFR BLD: 1 % (ref 0–2)
BLOOD GROUP ANTIBODIES SERPL: NORMAL
BUN SERPL-MCNC: 96 MG/DL (ref 7–18)
BUN/CREAT SERPL: 16 (ref 12–20)
CALCIUM SERPL-MCNC: 8.6 MG/DL (ref 8.5–10.1)
CHLORIDE SERPL-SCNC: 107 MMOL/L (ref 100–111)
CO2 SERPL-SCNC: 22 MMOL/L (ref 21–32)
CREAT SERPL-MCNC: 5.89 MG/DL (ref 0.6–1.3)
DIFFERENTIAL METHOD BLD: ABNORMAL
EOSINOPHIL # BLD: 0.1 K/UL (ref 0–0.4)
EOSINOPHIL NFR BLD: 4 % (ref 0–5)
ERYTHROCYTE [DISTWIDTH] IN BLOOD BY AUTOMATED COUNT: 13.8 % (ref 11.6–14.5)
GLUCOSE BLD STRIP.AUTO-MCNC: 138 MG/DL (ref 70–110)
GLUCOSE BLD STRIP.AUTO-MCNC: 173 MG/DL (ref 70–110)
GLUCOSE BLD STRIP.AUTO-MCNC: 179 MG/DL (ref 70–110)
GLUCOSE SERPL-MCNC: 117 MG/DL (ref 74–99)
HCT VFR BLD AUTO: 21.2 % (ref 36–48)
HGB BLD-MCNC: 7.1 G/DL (ref 13–16)
IMM GRANULOCYTES # BLD AUTO: 0 K/UL (ref 0–0.04)
IMM GRANULOCYTES NFR BLD AUTO: 1 % (ref 0–0.5)
LYMPHOCYTES # BLD: 0.3 K/UL (ref 0.9–3.6)
LYMPHOCYTES NFR BLD: 19 % (ref 21–52)
MCH RBC QN AUTO: 35.1 PG (ref 24–34)
MCHC RBC AUTO-ENTMCNC: 33.5 G/DL (ref 31–37)
MCV RBC AUTO: 105 FL (ref 78–100)
MONOCYTES # BLD: 0.2 K/UL (ref 0.05–1.2)
MONOCYTES NFR BLD: 11 % (ref 3–10)
NEUTS SEG # BLD: 0.8 K/UL (ref 1.8–8)
NEUTS SEG NFR BLD: 64 % (ref 40–73)
NRBC # BLD: 0 K/UL (ref 0–0.01)
NRBC BLD-RTO: 0 PER 100 WBC
PLATELET # BLD AUTO: 49 K/UL (ref 135–420)
PLATELET COMMENTS,PCOM: ABNORMAL
PMV BLD AUTO: 10.8 FL (ref 9.2–11.8)
POTASSIUM SERPL-SCNC: 3.4 MMOL/L (ref 3.5–5.5)
RBC # BLD AUTO: 2.02 M/UL (ref 4.35–5.65)
RBC MORPH BLD: ABNORMAL
SODIUM SERPL-SCNC: 142 MMOL/L (ref 136–145)
SPECIMEN EXP DATE BLD: NORMAL
WBC # BLD AUTO: 1.4 K/UL (ref 4.6–13.2)

## 2022-11-23 PROCEDURE — C9399 UNCLASSIFIED DRUGS OR BIOLOG: HCPCS | Performed by: STUDENT IN AN ORGANIZED HEALTH CARE EDUCATION/TRAINING PROGRAM

## 2022-11-23 PROCEDURE — 77030008477 HC STYL SATN SLP COVD -A: Performed by: NURSE ANESTHETIST, CERTIFIED REGISTERED

## 2022-11-23 PROCEDURE — 74011000250 HC RX REV CODE- 250: Performed by: RADIOLOGY

## 2022-11-23 PROCEDURE — 86900 BLOOD TYPING SEROLOGIC ABO: CPT

## 2022-11-23 PROCEDURE — 74011000250 HC RX REV CODE- 250: Performed by: NURSE ANESTHETIST, CERTIFIED REGISTERED

## 2022-11-23 PROCEDURE — 74011250636 HC RX REV CODE- 250/636: Performed by: STUDENT IN AN ORGANIZED HEALTH CARE EDUCATION/TRAINING PROGRAM

## 2022-11-23 PROCEDURE — 0DJ08ZZ INSPECTION OF UPPER INTESTINAL TRACT, VIA NATURAL OR ARTIFICIAL OPENING ENDOSCOPIC: ICD-10-PCS | Performed by: INTERNAL MEDICINE

## 2022-11-23 PROCEDURE — 2709999900 HC NON-CHARGEABLE SUPPLY: Performed by: INTERNAL MEDICINE

## 2022-11-23 PROCEDURE — 99233 SBSQ HOSP IP/OBS HIGH 50: CPT | Performed by: INTERNAL MEDICINE

## 2022-11-23 PROCEDURE — 36415 COLL VENOUS BLD VENIPUNCTURE: CPT

## 2022-11-23 PROCEDURE — 82962 GLUCOSE BLOOD TEST: CPT

## 2022-11-23 PROCEDURE — 74011250636 HC RX REV CODE- 250/636: Performed by: NURSE ANESTHETIST, CERTIFIED REGISTERED

## 2022-11-23 PROCEDURE — 76060000033 HC ANESTHESIA 1 TO 1.5 HR: Performed by: INTERNAL MEDICINE

## 2022-11-23 PROCEDURE — 74011250636 HC RX REV CODE- 250/636: Performed by: INTERNAL MEDICINE

## 2022-11-23 PROCEDURE — 80048 BASIC METABOLIC PNL TOTAL CA: CPT

## 2022-11-23 PROCEDURE — 74011250637 HC RX REV CODE- 250/637: Performed by: INTERNAL MEDICINE

## 2022-11-23 PROCEDURE — 77030006643: Performed by: NURSE ANESTHETIST, CERTIFIED REGISTERED

## 2022-11-23 PROCEDURE — 74011000250 HC RX REV CODE- 250: Performed by: ANESTHESIOLOGY

## 2022-11-23 PROCEDURE — 65270000046 HC RM TELEMETRY

## 2022-11-23 PROCEDURE — 74011636637 HC RX REV CODE- 636/637: Performed by: INTERNAL MEDICINE

## 2022-11-23 PROCEDURE — 77030008683 HC TU ET CUF COVD -A: Performed by: NURSE ANESTHETIST, CERTIFIED REGISTERED

## 2022-11-23 PROCEDURE — 74011250636 HC RX REV CODE- 250/636: Performed by: ANESTHESIOLOGY

## 2022-11-23 PROCEDURE — 74011000250 HC RX REV CODE- 250: Performed by: INTERNAL MEDICINE

## 2022-11-23 PROCEDURE — 85025 COMPLETE CBC W/AUTO DIFF WBC: CPT

## 2022-11-23 PROCEDURE — 76040000008: Performed by: INTERNAL MEDICINE

## 2022-11-23 RX ORDER — LIDOCAINE HYDROCHLORIDE 20 MG/ML
INJECTION, SOLUTION EPIDURAL; INFILTRATION; INTRACAUDAL; PERINEURAL AS NEEDED
Status: DISCONTINUED | OUTPATIENT
Start: 2022-11-23 | End: 2022-11-23 | Stop reason: HOSPADM

## 2022-11-23 RX ORDER — FENTANYL CITRATE 50 UG/ML
INJECTION, SOLUTION INTRAMUSCULAR; INTRAVENOUS AS NEEDED
Status: DISCONTINUED | OUTPATIENT
Start: 2022-11-23 | End: 2022-11-23 | Stop reason: HOSPADM

## 2022-11-23 RX ORDER — SODIUM CHLORIDE, SODIUM LACTATE, POTASSIUM CHLORIDE, CALCIUM CHLORIDE 600; 310; 30; 20 MG/100ML; MG/100ML; MG/100ML; MG/100ML
1000 INJECTION, SOLUTION INTRAVENOUS CONTINUOUS
Status: DISCONTINUED | OUTPATIENT
Start: 2022-11-23 | End: 2022-11-25

## 2022-11-23 RX ORDER — PROPOFOL 10 MG/ML
INJECTION, EMULSION INTRAVENOUS AS NEEDED
Status: DISCONTINUED | OUTPATIENT
Start: 2022-11-23 | End: 2022-11-23 | Stop reason: HOSPADM

## 2022-11-23 RX ORDER — MAGNESIUM SULFATE 100 %
4 CRYSTALS MISCELLANEOUS AS NEEDED
Status: DISCONTINUED | OUTPATIENT
Start: 2022-11-23 | End: 2022-11-23 | Stop reason: SDUPTHER

## 2022-11-23 RX ORDER — EPINEPHRINE 0.1 MG/ML
1 INJECTION INTRACARDIAC; INTRAVENOUS
Status: ACTIVE | OUTPATIENT
Start: 2022-11-23 | End: 2022-11-24

## 2022-11-23 RX ORDER — SUCCINYLCHOLINE CHLORIDE 100 MG/5ML
SYRINGE (ML) INTRAVENOUS AS NEEDED
Status: DISCONTINUED | OUTPATIENT
Start: 2022-11-23 | End: 2022-11-23 | Stop reason: HOSPADM

## 2022-11-23 RX ORDER — FLUMAZENIL 0.1 MG/ML
0.2 INJECTION INTRAVENOUS
Status: DISCONTINUED | OUTPATIENT
Start: 2022-11-23 | End: 2022-11-27 | Stop reason: HOSPADM

## 2022-11-23 RX ORDER — FLUMAZENIL 0.1 MG/ML
0.2 INJECTION INTRAVENOUS
Status: ACTIVE | OUTPATIENT
Start: 2022-11-23 | End: 2022-11-23

## 2022-11-23 RX ORDER — SODIUM CHLORIDE 0.9 % (FLUSH) 0.9 %
5-40 SYRINGE (ML) INJECTION AS NEEDED
Status: DISCONTINUED | OUTPATIENT
Start: 2022-11-23 | End: 2022-11-23 | Stop reason: SDUPTHER

## 2022-11-23 RX ORDER — DEXTROMETHORPHAN/PSEUDOEPHED 2.5-7.5/.8
1.2 DROPS ORAL
Status: DISCONTINUED | OUTPATIENT
Start: 2022-11-23 | End: 2022-11-27 | Stop reason: HOSPADM

## 2022-11-23 RX ORDER — ATROPINE SULFATE 0.1 MG/ML
0.5 INJECTION INTRAVENOUS
Status: ACTIVE | OUTPATIENT
Start: 2022-11-23 | End: 2022-11-24

## 2022-11-23 RX ORDER — SODIUM CHLORIDE 0.9 % (FLUSH) 0.9 %
5-40 SYRINGE (ML) INJECTION EVERY 8 HOURS
Status: DISCONTINUED | OUTPATIENT
Start: 2022-11-23 | End: 2022-11-27 | Stop reason: HOSPADM

## 2022-11-23 RX ORDER — SODIUM CHLORIDE 9 MG/ML
100 INJECTION, SOLUTION INTRAVENOUS CONTINUOUS
Status: DISPENSED | OUTPATIENT
Start: 2022-11-23 | End: 2022-11-23

## 2022-11-23 RX ORDER — NALOXONE HYDROCHLORIDE 0.4 MG/ML
0.1 INJECTION, SOLUTION INTRAMUSCULAR; INTRAVENOUS; SUBCUTANEOUS AS NEEDED
Status: DISCONTINUED | OUTPATIENT
Start: 2022-11-23 | End: 2022-11-27 | Stop reason: HOSPADM

## 2022-11-23 RX ORDER — NALOXONE HYDROCHLORIDE 0.4 MG/ML
0.4 INJECTION, SOLUTION INTRAMUSCULAR; INTRAVENOUS; SUBCUTANEOUS
Status: ACTIVE | OUTPATIENT
Start: 2022-11-23 | End: 2022-11-23

## 2022-11-23 RX ORDER — SODIUM CHLORIDE 0.9 % (FLUSH) 0.9 %
5-40 SYRINGE (ML) INJECTION EVERY 8 HOURS
Status: DISCONTINUED | OUTPATIENT
Start: 2022-11-23 | End: 2022-11-23 | Stop reason: SDUPTHER

## 2022-11-23 RX ORDER — PHENYLEPHRINE HCL IN 0.9% NACL 1 MG/10 ML
SYRINGE (ML) INTRAVENOUS AS NEEDED
Status: DISCONTINUED | OUTPATIENT
Start: 2022-11-23 | End: 2022-11-23 | Stop reason: HOSPADM

## 2022-11-23 RX ADMIN — ALBUMIN HUMAN 25 G: 0.25 SOLUTION INTRAVENOUS at 21:04

## 2022-11-23 RX ADMIN — SODIUM CHLORIDE: 900 INJECTION, SOLUTION INTRAVENOUS at 07:00

## 2022-11-23 RX ADMIN — FERROUS SULFATE TAB 325 MG (65 MG ELEMENTAL FE) 325 MG: 325 (65 FE) TAB at 17:12

## 2022-11-23 RX ADMIN — SODIUM CHLORIDE, PRESERVATIVE FREE 10 ML: 5 INJECTION INTRAVENOUS at 14:00

## 2022-11-23 RX ADMIN — Medication 100 MCG: at 07:26

## 2022-11-23 RX ADMIN — DOXAZOSIN MESYLATE 4 MG: 4 TABLET ORAL at 21:05

## 2022-11-23 RX ADMIN — Medication 100 MCG: at 07:28

## 2022-11-23 RX ADMIN — Medication 100 MCG: at 07:24

## 2022-11-23 RX ADMIN — ALBUMIN HUMAN 25 G: 0.25 SOLUTION INTRAVENOUS at 02:46

## 2022-11-23 RX ADMIN — SODIUM CHLORIDE, PRESERVATIVE FREE 10 ML: 5 INJECTION INTRAVENOUS at 10:38

## 2022-11-23 RX ADMIN — Medication 100 MCG: at 07:30

## 2022-11-23 RX ADMIN — FENTANYL CITRATE 50 MCG: 50 INJECTION, SOLUTION INTRAMUSCULAR; INTRAVENOUS at 07:22

## 2022-11-23 RX ADMIN — Medication 2 TABLET: at 21:05

## 2022-11-23 RX ADMIN — SODIUM CHLORIDE, POTASSIUM CHLORIDE, SODIUM LACTATE AND CALCIUM CHLORIDE 1000 ML: 600; 310; 30; 20 INJECTION, SOLUTION INTRAVENOUS at 13:07

## 2022-11-23 RX ADMIN — Medication 100 MG: at 07:24

## 2022-11-23 RX ADMIN — SODIUM CHLORIDE, PRESERVATIVE FREE 10 ML: 5 INJECTION INTRAVENOUS at 17:12

## 2022-11-23 RX ADMIN — Medication 2 TABLET: at 10:32

## 2022-11-23 RX ADMIN — ALBUMIN HUMAN 25 G: 0.25 SOLUTION INTRAVENOUS at 17:12

## 2022-11-23 RX ADMIN — CETIRIZINE HYDROCHLORIDE 10 MG: 10 TABLET, FILM COATED ORAL at 21:05

## 2022-11-23 RX ADMIN — ALBUMIN HUMAN 25 G: 0.25 SOLUTION INTRAVENOUS at 10:32

## 2022-11-23 RX ADMIN — PROPOFOL 110 MG: 10 INJECTION, EMULSION INTRAVENOUS at 07:24

## 2022-11-23 RX ADMIN — FERROUS SULFATE TAB 325 MG (65 MG ELEMENTAL FE) 325 MG: 325 (65 FE) TAB at 10:33

## 2022-11-23 RX ADMIN — LIDOCAINE HYDROCHLORIDE 100 MG: 20 INJECTION, SOLUTION INTRAVENOUS at 07:24

## 2022-11-23 NOTE — ANESTHESIA PREPROCEDURE EVALUATION
Relevant Problems   CARDIOVASCULAR   (+) Aortic stenosis      RENAL FAILURE   (+) NAPOLEON (acute kidney injury) ([facility] [address]   (+) Acute kidney injury superimposed on chronic kidney disease (HCC)   (+) CKD (chronic kidney disease)       Anesthetic History   No history of anesthetic complications            Review of Systems / Medical History  Patient summary reviewed, nursing notes reviewed and pertinent labs reviewed    Pulmonary  Within defined limits            Pertinent negatives: No smoker     Neuro/Psych   Within defined limits           Cardiovascular    Hypertension              Exercise tolerance: <4 METS     GI/Hepatic/Renal     GERD    Renal disease: ARF  PUD and liver disease     Endo/Other        Morbid obesity and anemia  Pertinent negatives: No diabetes, hypothyroidism and hyperthyroidism  Comments: 7.1 hb  Other Findings   Comments: Possible varices    Platelets less than 50 now and GI is aware and wishes to proceed         Physical Exam    Airway  Mallampati: III  TM Distance: 4 - 6 cm  Neck ROM: normal range of motion   Mouth opening: Normal     Cardiovascular    Rhythm: regular  Rate: normal    Murmur, Aortic area     Dental         Pulmonary  Breath sounds clear to auscultation               Abdominal    Ascites and distended     Other Findings            Anesthetic Plan    ASA: 4  Anesthesia type: general          Induction: Intravenous and RSI  Anesthetic plan and risks discussed with: Patient      Protect airway with ett  t and screen

## 2022-11-23 NOTE — ANESTHESIA POSTPROCEDURE EVALUATION
Procedure(s):  ESOPHAGOGASTRODUODENOSCOPY (EGD). general    Anesthesia Post Evaluation        Comments: Post-Anesthesia Evaluation and Assessment    Cardiovascular Function/Vital Signs  /62   Pulse 85   Temp 36.5 °C (97.7 °F)   Resp 16   Ht 6' (1.829 m)   Wt 124.8 kg (275 lb 2 oz)   SpO2 100%   BMI 37.31 kg/m²     Patient is status post Procedure(s):  ESOPHAGOGASTRODUODENOSCOPY (EGD). Nausea/Vomiting: Controlled. Postoperative hydration reviewed and adequate. Pain:  Pain Scale 1: Numeric (0 - 10) (11/23/22 1132)  Pain Intensity 1: 0 (11/23/22 1132)   Managed. Neurological Status: At baseline. Mental Status and Level of Consciousness: Arousable. Pulmonary Status:   O2 Device: None (Room air) (11/23/22 0835)   Adequate oxygenation and airway patent. Complications related to anesthesia: None    Post-anesthesia assessment completed. No concerns. Patient has met all discharge requirements.     Signed By: Matthew Falcon MD    November 23, 2022                   INITIAL Post-op Vital signs:   Vitals Value Taken Time   /62 11/23/22 1141   Temp 36.5 °C (97.7 °F) 11/23/22 1141   Pulse 85 11/23/22 1141   Resp 16 11/23/22 1141   SpO2 100 % 11/23/22 1141

## 2022-11-23 NOTE — PROGRESS NOTES
Comprehensive Nutrition Assessment    Type and Reason for Visit: Reassess    Nutrition Recommendations/Plan:   Continue with POC/PO diet order  Monitor % meal intake. Malnutrition Assessment:  Malnutrition Status: At risk for malnutrition (specify) (11/18/22 1153)      Nutrition Assessment:    Admitted with NAPOLEON on CKD, generalized ascites s/p paracentesis removed 3.2L, severe aortic stenosis, thrombocytopenia. Pt has EGD this morning. Consulted for low oxalate diet. Visited pt and wife was at bedside. Continues to eat well. States alittle better since last assessment. Provided edu for low oxalate diet. No questions or concerns at this time. Nutrition Related Findings:    K 3.4, BUN 96, creatinine 5.89, GFR 9. POC glucose (24hr) 127-236. Ferrous sulfate, humalog, floranex. BM 11/22 loose. Current Nutrition Intake & Therapies:  Average Meal Intake: 51-75%  Average Supplement Intake: None ordered  ADULT DIET Regular    Anthropometric Measures:  Height: 6' (182.9 cm)  Ideal Body Weight (IBW): 178 lbs (81 kg)     Current Body Wt:  124.8 kg (275 lb 2.2 oz), 152.6 % IBW. Current BMI (kg/m2): 37.3  Usual Body Weight: 122.9 kg (271 lb) (10/2022)  % Weight Change (Calculated): 0.2  Weight Adjustment: No adjustment                 BMI Category: Obese class 2 (BMI 35.0-39. 9)    Estimated Daily Nutrient Needs:  Energy Requirements Based On: Formula  Weight Used for Energy Requirements: Current (MSJ x1.2)  Energy (kcal/day): 2372  Weight Used for Protein Requirements: Current (0.8g r/t renal function)  Protein (g/day): 100  Method Used for Fluid Requirements: 1 ml/kcal  Fluid (ml/day): 8855    Nutrition Diagnosis:   Predicted inadequate energy intake related to acute injury/trauma as evidenced by intake 51-75%    Nutrition Interventions:   Food and/or Nutrient Delivery: Continue current diet  Nutrition Education/Counseling: No recommendations at this time  Coordination of Nutrition Care: Continue to monitor while inpatient, Interdisciplinary rounds       Nutrition Education:  Educated on Low Oxalate Diet  Learners: Patient and Significant Other  Readiness: Acceptance  Method: Handout  Response: Verbalizes Understanding  Contact name and number provided.     Goals:  Previous Goal Met: Progressing toward goal(s)  Goals: PO intake 75% or greater, by next RD assessment       Nutrition Monitoring and Evaluation:   Behavioral-Environmental Outcomes: None identified  Food/Nutrient Intake Outcomes: Food and nutrient intake  Physical Signs/Symptoms Outcomes: Biochemical data, Meal time behavior, Nutrition focused physical findings, Skin, GI status, Weight    Discharge Planning:    Continue current diet    Chuy Becker RD

## 2022-11-23 NOTE — PROGRESS NOTES
[facility], MD, FACP, [place] [place], [place]      Lee TL Easton, AGPCNP-BC   Ioana Obregon, [facility]-AG   Anthony Vann, FNP-C  Nora Browne, FNP-C   Omid Attila, AGPCNP-BC      [address]   at [address], [address]   [phone]   FAX: [phone]  [facility] [place] [facility] [place]   at [address], [address] [place] [address], [address]   [phone]   FAX: [phone]         HEPATOLOGY PROGRESS NOTE  The patient is a 78 y.o.  male without a history of chronic liver disease. He was hospitalized for progressive increase in lower extremity edema and found to have NAPOLEON, thrombocytopenia and cirrhosis. In the ED Laboratory studies were significant for:    WBC 2.5 , HB 11.0 gms, PLT 65, Sna 139, Scr 5.92 mg, AST 27, ALT 38, ALP 96, TBILI 1.9 mg,     CT scan of the abdomen demonstrated changes consistent with cirrhosis. No liver masses. Moderate ascites      An ECHO of heart demonstrated normal LVEF 65-70%, normal PAP, normal RV, trace TR. Hospital Course:  Paracentesis with analysis of ascites shows Cell count is negative for SBP. SAAG of 2.0-0.6= 1.4   Serology for all causes of chronic liver disease are negative  Evaluation of NAPOLEON is not consistent with nephrotic syndrome as etiology for ascites. 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 For EGD today to assess for changes consistent with cirrhosis  [place]:  EGD demonstrated portal gastropathy c/w cirrhosis and portal HTN. An ulcer is present in the small bowel, second portion of duodenum. Hepatology Plan:   Will consider performing transjugular liver biopsy to see if liver also has amyloid or just cirrhosis      ASSESSMENT AND PLAN:  Cirrhosis  The diagnosis of cirrhosis is based upon imaging, laboratory studies, complications of cirrhosis. Cirrhosis is of unclear etiology. The patient has normal/near normal liver function. The patient has never developed any complications of cirrhosis to date. The CTP is 7. Child class B. The MELD score is 12. Serologic testing for causes of chronic liver disease was negative. 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 EGD demonstrated portal gastropathy which is consistent with cirrhosis. The finding of amyloid in the kidney raises possibility that liver disease may also be the result of amyloidosis. However, liver amyloid is associated with increased in ALP and his ALP is normal.  I do not suspect that the liver disease is due to Amyloid. Still most likely WALLACE and both have no medical therapy. No reason to proceed with liver biopsy especially with PLT of 49K. Ascites   Ascites developed for the first time in 11/2022. This is probably due to a combination of cirrhosis and NAPOLEON. Cirrhosis was likely well compensated for years without ascites until CKD worsened. Cannot use diuretics because of NAPOLEON    Lower extremity edema  Edema is severe 4+ and due to a combination of cirrhosis and NAPOLEON. Acute kidney injury superimposed upon CKD  The patient has developed an elevation in serum creatinine to 6.4 mg  NAPOLEON is secondary to worsening CKD. Renal biopsy demonstrated IgA nephropathy which is likely the reason for CKD. The renal biopsy also contained some amylodi deposits but was not consistent with systemic amylosidosis    Duodenal bowel ulcer  EGD demonstrated an ulcer in the second portion of duodenum. This locations is not consistent with peptic ulcer. No need for PPI. Most likely the ulcer is due to known history of Crohns disease.   A biopsy of the ulcer was not performed because of severe thrombocytopenia  Chronic ulcers such as occurs in Crohns is one reason for amyloid and this may have contributed to the kidney findings. Screening for Esophageal varices   The patient has not had an EGD to screen for varices. Will schedule for EGD to assess for varices on Wednesday. NPO after MN on Tuesday    Anemia   This is due to multifactorial causes including portal hypertension with chronic GI blood loss,   The HB on admission was 11 gms. This has drifted down during the hositalization. There is no evidence of active GI blood loss. Will perform EGD Monday AM.    Thrombocytopenia   This is secondary to cirrhosis. There is no evidence of overt bleeding. No treatment is required. He has been started on promacta to raise the PLT count  The platelet count is adequate for the patient to undergo procedures without the need for platelet transfusion or platelet growth factors as long sa this is over 50K. PHYSICAL EXAMINATION:  VS: per nursing note  General:  No acute distress. Eyes:  Sclera anicteric. ENT:  No oral lesions. Thyroid normal.  Nodes:  No adenopathy. Skin:  No spider angiomata. No jaundice. Ecchymosis  Respiratory:  Lungs clear to auscultation. Cardiovascular:  Regular heart rate. Abdomen:  Distended. Tight. Ascites appears to be present. Extremities:  4+ lower extremity edema. Neurologic:  Alert and oriented. Cranial nerves grossly intact. No asterixis.     LABORATORY:   Latest Reference Range & Units 11/21/22 05:15 11/22/22 05:40 11/23/22 01:58   WBC 4.6 - 13.2 K/uL 1.6 (L) 1.5 (L) 1.4 (L)   HGB 13.0 - 16.0 g/dL 7.9 (L) 7.1 (L) 7.1 (L)   PLATELET 613 - 361 K/uL 54 (L) 50 (L) 49 (L)   Sodium 136 - 145 mmol/L 141 141 142   Potassium 3.5 - 5.5 mmol/L 3.4 (L) 3.1 (L) 3.4 (L)   Chloride 100 - 111 mmol/L 105 108 107   CO2 21 - 32 mmol/L 24 23 22   Glucose 74 - 99 mg/dL 116 (H) 118 (H) 117 (H)   BUN 7.0 - 18 MG/ (H) 101 (H) 96 (H)   Creatinine 0.6 - 1.3 MG/DL 6.39 (H) 6.24 (H) 5.89 (H)   Albumin 3.4 - 5.0 g/dL 2.9 (L)     (L): Data is abnormally low  (H): Data is abnormally high      Delicia Alan MD  [address] [facility]  [facility], [address] [place] [address] Street [address]  [phone]  FAX: [phone]  [address] [place] [address]

## 2022-11-23 NOTE — PERIOP NOTES
Report called to [address] [place] [address] on [address] [place] [address], [facility], procedure done under general anesthesia; patient is alert and oriented, O2 sats 97% on RA, no complaints of pain. Findings: portal gastropathy, duodenal ulcer.

## 2022-11-23 NOTE — PROGRESS NOTES
Hospitalist Progress Note-critical care note     Patient: Ed Antony MRN: [id number]  CSN: [id number]    YOB: 1940  Age: 78 y.o. 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 Angela Huddleston seeing pt and upper egd done EGD demonstrated portal gastropathy   Post paracentesis   Continue albumin infusion     Anasarca   Due to liver and renal disease   Continue albumin possible hd     Uti completed abx     Severe as   Cardiologist seeing pt     Pancytopenia transfuse as needed     Subjective still swelling , urinated , feel better got fluid out     Wife was at the bedside    Prognosis is guided   All questions have been answered. 35 total min's spent on patient care including >50% on counseling/coordinating care. Discussed the above assessments.  also discussed labs, medications and hospital course      Disposition :tbd   Review of systems:    General: No fevers or chills. Cardiovascular: No chest pain or pressure. No palpitations. Pulmonary: No shortness of breath. Gastrointestinal: No nausea, vomiting. Vital signs/Intake and Output:  Visit Vitals  /62   Pulse 85   Temp 97.7 °F (36.5 °C)   Resp 16   Ht 6' (1.829 m)   Wt 124.8 kg (275 lb 2 oz)   SpO2 100%   BMI 37.31 kg/m²     Current Shift:  11/23 0701 - 11/23 1900  In: 300 [I.V.:300]  Out: 500 [Urine:500]  Last three shifts:  11/21 1901 - 11/23 0700  In: -   Out: 1600 [Urine:1600]    Physical Exam:  General: WD, WN. Alert, cooperative, no acute distress    HEENT: NC, Atraumatic. PERRLA, anicteric sclerae. Lungs: CTA Bilaterally. No Wheezing/Rhonchi/Rales. Heart:  Regular  rhythm,  + murmur, No Rubs, No Gallops  Abdomen: Soft, Non distended, Non tender. +Bowel sounds, edema   Extremities: No c/c, edema   Psych:   Not anxious or agitated. Neurologic:  No acute neurological deficit. Labs: Results:       Chemistry Recent Labs     11/23/22  0158 11/22/22  0540 11/21/22  0515   * 118* 116*    141 141   K 3.4* 3.1* 3.4*    108 105   CO2 22 23 24   BUN 96* 101* 109*   CREA 5.89* 6.24* 6.39*   CA 8.6 8.4* 8.6   AGAP 13 10 12   BUCR 16 16 17   ALB  --   --  2.9*      CBC w/Diff Recent Labs     11/23/22  0158 11/22/22  0540 11/21/22  0515   WBC 1.4* 1.5* 1.6*   RBC 2.02* 2.04* 2.27*   HGB 7.1* 7.1* 7.9*   HCT 21.2* 21.3* 23.7*   PLT 49* 50* 54*   GRANS 64 66 64   LYMPH 19* 18* 20*   EOS 4 5 3      Cardiac Enzymes No results for input(s): CPK, CKND1, CESAR in the last 72 hours. No lab exists for component: CKRMB, TROIP   Coagulation No results for input(s): PTP, INR, APTT, INREXT, INREXT in the last 72 hours.     Lipid Panel Lab Results   Component Value Date/Time    Cholesterol, total 102 10/07/2022 03:45 AM    HDL Cholesterol 42 10/07/2022 03:45 AM    LDL, calculated 47.6 10/07/2022 03:45 AM    VLDL, calculated 12.4 10/07/2022 03:45 AM    Triglyceride 62 10/07/2022 03:45 AM    CHOL/HDL Ratio 2.4 10/07/2022 03:45 AM      BNP No results for input(s): BNPP in the last 72 hours. Liver Enzymes Recent Labs     11/21/22  0515   ALB 2.9*      Thyroid Studies No results found for: T4, T3U, TSH, TSHEXT, TSHEXT     Procedures/imaging: see electronic medical records for all procedures/Xrays and details which were not copied into this note but were reviewed prior to creation of Plan    CT CHEST ABD PELV WO CONT    Result Date: 11/16/2022  EXAM: CT chest, abdomen, and pelvis INDICATION: \"Anasarca\"   > Patient currently admitted for renal failure, edema, and shortness of breath. COMPARISON: No relevant prior study. TECHNIQUE: Axial CT imaging of the chest, abdomen, and pelvis was performed without IV contrast administration. Multiplanar reformats were generated. One or more dose reduction techniques were used on this CT: automated exposure control, adjustment of the mAs and/or kVp according to patient size, and iterative reconstruction techniques. The specific techniques used on this CT exam have been documented in the patient's electronic medical record. Digital Imaging and Communications in Medicine (DICOM) format image data are available to nonaffiliated external healthcare facilities or entities on a secure, media free, reciprocally searchable basis with patient authorization for at least a 12-month period after this study. _______________ FINDINGS: CHEST: MEDIASTINUM: Thoracic aorta is normal in course and caliber. No evidence of pericardial effusion. Normal cardiac size. Multivessel coronary arterial atherosclerotic vascular calcifications. No pericardial effusion. LYMPH NODES: No pathologically enlarged mediastinal or hilar lymph nodes. PLEURA: Unremarkable without pleural effusion or pneumothorax. LUNGS/AIRWAY: Central airways are patent. Mild dependent changes of atelectasis.  Minor peribronchial groundglass densities are seen bilaterally. No consolidation. OTHER: None. ABDOMEN/PELVIS: LIVER, BILIARY: Lobular hepatic contour and widened periportal space noted. No abnormal biliary dilation. Multiple gallstones are seen within the gallbladder. PANCREAS: Normal unenhanced appearance. SPLEEN: Mildly enlarged estimated at approximately 16 cm in craniocaudal span. ADRENALS: Unremarkable. KIDNEYS: No evidence of hydronephrosis. Bilateral renal atrophy. Punctate nonobstructing upper pole right and mid/lower pole left renal calculi. LYMPH NODES: No pathologically enlarged lymph nodes. GASTROINTESTINAL TRACT: There is a small hiatal hernia. No morphology of bowel obstruction. No free intraperitoneal gas. Large burden of formed stool throughout the large intestine. Evidence of prior ileocolic colonic resection and reanastomosis. PELVIC ORGANS: Urinary bladder decompressed with Gillis catheter in situ. VASCULATURE: Diffuse aortobiiliac atherosclerosis is present. 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 OSSEOUS: Extended thoracic lumbar spinal fusion instrumentation is present spanning the T10-S1 levels. Multilevel spondylosis and levorotatory scoliosis of the mid lumbar spine is demonstrated. Surgical instrumentation appears intact. No clearly acute or aggressive appearing osseous abnormality. OTHER: Diffuse body wall edema with small moderate amount of abdominal/pelvic ascites. _______________     1. Within the chest:   > Mild dependent changes of atelectasis and peribronchial areas of groundglass density which may reflect a manifestation of small airways disease or potentially alveolar edema   > No evidence of pleural effusion. 2. Within the abdomen/pelvis:   > Cirrhotic hepatic morphology and signal of portal hypertension with small moderate amount of ascites.   > No abnormal bowel wall thickening or inflammatory changes.  Prior ileocolonic resection and reanastomosis. > Cholelithiasis without evidence of cholecystitis.   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   > Extensive body wall edema.   > No evidence of obstructive uropathy. Urinary bladder decompressed with Gillis catheter in situ. US GUIDE PARACENTESIS    Result Date: 11/17/2022  PROCEDURE:  ULTRASOUND GUIDED PARACENTESIS INDICATION: Ascites. ________________________ TECHNIQUE/FINDINGS: I performed the procedure. The ascites in the right paracolic gutter was localized under ultrasound guidance. An image of the ascites was obtained and submitted into the patient record. The skin was marked, prepped and draped in sterile fashion and lidocaine was used for local anesthesia. Sterile probe cover and gel was used. A 5 Ecu[place]orean sheathed needle was advanced into the fluid. A total of 3 200 mL of straw colored fluid was drained. Sample of fluid was sent for laboratory evaluation. The patient tolerated the procedure well and there were no immediate complications. Pre-procedure time out:  0900 hours. GUIDANCE: Ultrasound guidance was used to position (and confirm the position of) the sheathed needle. Image(s) saved in PACS: Ultrasound ________________________     Successful ultrasound guided paracentesis of approximately 3200 mL of straw colored fluid. US RETROPERITONEUM COMP    Result Date: 11/13/2022  Ultrasound retroperitoneal INDICATIONS: Renal failure COMPARISON: None FINDINGS: The right kidney is normal in size, measuring 10.4 cm in length. Increased right renal cortical echogenicity. No solid or cystic right renal mass, hydronephrosis, or calculi. The left kidney is normal in size, measuring 10.5 cm in length. Increased left renal cortical echogenicity. No solid or cystic left renal mass, hydronephrosis, or calculi.  Gillis catheter is seen within the nondistended bladder. Partially visualized nodular increased echogenicity throughout visualized liver. Partially visualized ascites. Increased left and right renal cortical echogenicity, suggestive of chronic medical renal disease. No obstructive uropathy or acute sonographic abnormality. XR CHEST PORT    Result Date: 11/12/2022  EXAM: One view chest x-ray CLINICAL INDICATION/HISTORY: Dyspnea and lower extremity swelling. COMPARISON: 09/06/2022. TECHNIQUE: Single AP view of the chest was obtained. _______________ FINDINGS: HEART, VESSELS, MEDIASTINUM: Heart size is stable. No vascular congestion. There is atherosclerosis of the thoracic aorta. LUNGS, PLEURAL SPACES: The lungs are clear. No effusion or pneumothorax. BONY THORAX, SOFT TISSUES: Unremarkable. _______________     No acute cardiopulmonary process. CT BX RENAL    Result Date: 11/18/2022  PROCEDURE: CT-guided left native kidney medical renal core biopsy INDICATION: ARF on CKD. Nephrology request native kidney medical renal core biopsy. Note of thrombocytopenia, platelet count is sufficient per guidelines, noting patient received DDAVP prior to the procedure. COMPARISON: CT 11/16/2022 TECHNIQUE AND FINDINGS: Written and witnessed informed consent was obtained prior to the procedure. GUIDANCE: CT guidance was used to position (and confirm the position of) the needles. Image(s) saved in PACS: CT. Patient was prone. Typical sterile preparation and draping. Skin entry site was chosen with noncontrast CT. Local anesthetic with 1% lidocaine. A 16-gauge Temno coaxial biopsy set was used. From a left paraspinous approach, the guide needle was advanced into the peripheral cortex of the lower pole of the left native kidney. Limited noncontrast CT was performed confirming the needle tip in good position. 2 visually good quality 16-gauge cores of tissue were obtained. The specimen was reviewed by the present pathologist demonstrating  adequate glomeruli.  An additional, extra core was considered but deferred given persistent bleeding from the guide needle and borderline laboratory parameters and grossly sufficient glomeruli present within the first 2 cores. 5 mm Gelfoam torpedoes were deployed x2 through the guide needle as the guide needle was removed. Hemostasis was supplemented with firm manual compression. The patient tolerated the procedure well, without complications. MODERATE SEDATION: Provided interventional radiology nursing, trained, qualified independent observer, DEENA Piper with versed 1 mg IV and fentanyl 25 ugm IV with continuous monitoring of vital signs. Start time 081 850 53 42 / End time 1100. Sedation time 17 minutes. Standard post procedure pause. _____________     Successful CT-guided left native kidney medical renal core biopsy. ECHO ADULT FOLLOW-UP OR LIMITED    Result Date: 11/16/2022    Left Ventricle: Normal left ventricular systolic function with a visually estimated EF of 65 - 70%. Right Ventricle: Right ventricle size is normal. Normal systolic function. Aortic Valve: Barnetta Miner Moderate stenosis of the aortic valve. AV mean gradient is 30 mmHg. AV peak gradient is 61 mmHg. AV peak velocity is 3.6-3.8 m/s. Tricuspid valve : Trace regurgitation. The estimated PASP is 37 mmHg. IVC/SVC: IVC diameter is less than or equal to 21 mm and decreases greater than 50% during inspiration; therefore the estimated right atrial pressure is normal (~3 mmHg). IVC size is normal.     ECHO ADULT FOLLOW-UP OR LIMITED    Result Date: 11/15/2022    Left Ventricle: Normal left ventricular systolic function with a visually estimated EF of 60 - 65%. Left ventricle size is normal. Increased wall thickness. Mild septal thickening. Normal wall motion. Grade I diastolic dysfunction present with normal LV EF. Aortic Valve: Mild regurgitation. Mild stenosis of the aortic valve. Mitral Valve: Moderate annular calcification at the anterior and posterior leaflets of the mitral valve. Mild stenosis noted. Tricuspid Valve: Unable to assess RVSP due to inadequate or insignificant tricuspid regurgitation. DUPLEX LOWER EXT VENOUS BILAT    Result Date: 11/16/2022  · No evidence of deep vein thrombosis in the right lower extremity veins assessed. · No evidence of deep vein thrombosis in the left lower extremity veins assessed. DUPLEX RENAL ART/RACHID BILATERAL    Result Date: 11/18/2022  Inadequate study secondary to body habitus and increase bowel gas. Unable to document any color flow on left kidney and renal artery bilaterally.       Sonal Trejo MD

## 2022-11-23 NOTE — PROGRESS NOTES
Problem: Self Care Deficits Care Plan (Adult)  Goal: *Acute Goals and Plan of Care (Insert Text)  Description: Occupational Therapy Goals  Initiated 11/15/2022 within 7 day(s). 1.  Patient will perform grooming with minimal assistance/contact guard assist standing at sink for 5 minutes or more. 2.  Patient will perform upper body dressing with supervision/set-up. 3.  Patient will perform lower body dressing with moderate assistance . 4. Patient will perform toilet transfers with minimal assistance/contact guard assist.  5.  Patient will perform all aspects of toileting with minimal assistance/contact guard assist.  6.  Patient will participate in upper extremity therapeutic exercise/activities with supervision/set-up for 10 minutes. 7.  Patient will utilize energy conservation techniques during functional activities with verbal, visual, and tactile cues. Outcome: Progressing Towards Goal    OCCUPATIONAL THERAPY TREATMENT    Patient: Lennart Angelucci. (78 y.o. male)  Date: 11/22/2022  Diagnosis: Hypoxia [R09.02]  NAPOLEON (acute kidney injury) ([facility] [N17.9]  Aortic stenosis [I35.0]  Peripheral edema [R60.9] NAPOLEON (acute kidney injury) ([facility]  Procedure(s) (LRB):  ESOPHAGOGASTRODUODENOSCOPY (EGD) (N/A)    Precautions: Fall  PLOF:     Chart, occupational therapy assessment, plan of care, and goals were reviewed. ASSESSMENT:  Pt presented seated in chair at the beginning of session and requests assistance for return to bed. Pt performed sit<>stand transfers, chair to bed transfers, and sit to supine transfers with CG-min A during this session. Pt was left supine in bed at the end of session in NAD.    Progression toward goals:  []          Improving appropriately and progressing toward goals  [x]          Improving slowly and progressing toward goals  []          Not making progress toward goals and plan of care will be adjusted     PLAN:  Patient continues to benefit from skilled intervention to address the above impairments. Continue treatment per established plan of care. Discharge Recommendations: Home Health  Further Equipment Recommendations for Discharge:  N/A    AMPA: 17/24    This AMPA score should be considered in conjunction with interdisciplinary team recommendations to determine the most appropriate discharge setting. Patient's social support, diagnosis, medical stability, and prior level of function should also be taken into consideration. SUBJECTIVE:   Patient stated  I am doing better everyday.     OBJECTIVE DATA SUMMARY:   Cognitive/Behavioral Status:  Neurologic State: Alert  Orientation Level: Oriented X4  Cognition: Appropriate for age attention/concentration, Follows commands  Safety/Judgement: Fall prevention    Functional Mobility and Transfers for ADLs:   Bed Mobility:  Sit to Supine: Contact guard assistance   Transfers:  Sit to Stand: Contact guard assistance  Stand to Sit: Contact guard assistance  Bed to Chair: Contact guard assistance  Balance:  Sitting: Intact  Standing: Intact; With support  Standing - Static: Good  Standing - Dynamic : Fair  ADL Intervention:  Cognitive Retraining  Safety/Judgement: Fall prevention  Pain:  Pain level pre-treatment: 0/10   Pain level post-treatment: 0/10  Pain Intervention(s): Medication (see MAR); Rest, Ice, Repositioning   Response to intervention: Nurse notified, See doc flow    Activity Tolerance:    Good     Please refer to the flowsheet for vital signs taken during this treatment. After treatment:   []  Patient left in no apparent distress sitting up in chair  [x]  Patient left in no apparent distress in bed  [x]  Call bell left within reach  [x]  Nursing notified  [x]  Caregiver present  []  Bed alarm activated    COMMUNICATION/EDUCATION:   [x] Role of Occupational Therapy in the acute care setting  [x] Home safety education was provided and the patient/caregiver indicated understanding.   [x] Patient/family have participated as able in working towards goals and plan of care. [x] Patient/family agree to work toward stated goals and plan of care. [] Patient understands intent and goals of therapy, but is neutral about his/her participation. [] Patient is unable to participate in goal setting and plan of care. Thank you for this referral.  Favoila Glaser OTR/L  Time Calculation: 20 mins    [address] [facility] [address] AM-PAC® Daily Activity Inpatient Short Form (6-Clicks)*    How much HELP from another person does the patient currently need    (If the patient hasn't done an activity recently, how much help from another person do you think he/she would need if he/she tried?)   Total (Total A or Dep)   A Lot  (Mod to Max A)   A Little (Sup or Min A)   None (Mod I to I)   Putting on and taking off regular lower body clothing? [] 1 [x] 2 [] 3 [] 4   2. Bathing (including washing, rinsing,      drying)? [] 1 [x] 2 [] 3 [] 4   3. Toileting, which includes using toilet, bedpan or urinal?   [] 1 [] 2 [x] 3 [] 4   4. Putting on and taking off regular upper body clothing? [] 1 [] 2 [x] 3 [] 4   5. Taking care of personal grooming such as brushing teeth? [] 1 [] 2 [x] 3 [] 4   6. Eating meals? [] 1 [] 2 [] 3 [x] 4      Based on an AM-PAC score of **/24 and their current ADL deficits; it is recommended that the patient have 5-7 sessions per week of Occupational Therapy at d/c to increase the patient's independence. Currently, this patient demonstrates the potential endurance, and/or tolerance for 3 hours of therapy each day at d/c. Based on an AM-PAC score of **/24 and their current ADL deficits; it is recommended that the patient have 3-5 sessions per week of Occupational Therapy at d/c to increase the patient's independence. Based on an AM-PAC score of **/24 and their current ADL deficits; it is recommended that the patient have 2-3 sessions per week of Occupational Therapy at d/c to increase the patient's independence.       At this time and based on an AM-PAC score of **/24, no further OT is recommended upon discharge due to (i.e. patient at baseline functional statusetc). Recommend patient returns to prior setting with prior services.

## 2022-11-23 NOTE — PROCEDURES
[address] [facility], MD, FACP, Rufino Andre Dickson Dakin, TL River[place], PCNP-BC   Erica Ling, [facility]-   Nadege Mcdonald, FNP-C  Roger Brownlee, FNP-C   Jacob Hartley, AGPCNP-BC      [address]   at [address], [address]  [address]   [phone]   FAX: [phone]   at [address], [address]   [phone]   FAX: [phone]         UPPER ENDOSCOPY PROCEDURE NOTE    Lise Austen.  1940    INDICATION: Cirrhosis. Screening for esophageal varices with variceal ligation if  indicated. : Toro Cooper MD    SURGICAL ASSISTANT:  None    PROSTHETIC DEVISES, TISSUE GRAFTS, ORGAN TRANSPLANTS:  Not applicable     ANESTHESIA/SEDATION: MAC Sedation per anesthesiology      PROCEDURE DESCRIPTION:  [employer] consent was obtained from the patient for the procedure. All risks and benefits of the procedure explained. The procedure was performed in the endoscopy suite. The patient was laying on a stretcher and moved to the left lateral decubitus position prior to administration of sedation. Sedation was administered by anesthesiology. See their note for details. The endoscope was inserted into the mouth and advanced under direct vision to the second portion of the duodenum. Careful inspection of upper gastrointestinal tract was made as the endoscope was inserted and withdrawn. Retroflexion of the endoscope to view of the cardia of the stomach was performed. The findings and interventions are described below. FINDINGS:  Esophagus:    Normal.      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 Duodenum:   Normal bulb.   Ulcer on right hand wall of second portion of duodenum. No biopsy obtained because of thrombocytopenia    SPECIMENS COLLECTED:   None    INTERVENTIONS:  None    COMPLICATIONS: None. The patient tolerated the procedure well. EBL: Negligible. RECOMMENDATIONS:  Observe until discharge parameters are achieved. May resume previous diet. Repeat endoscopy to reassess varices and need for banding 2 years. Follow-up [facility] office as scheduled.       Annette Escudero MD  [address], [address] [phone]  FAX: [phone] [facility]

## 2022-11-23 NOTE — PROGRESS NOTES
Patient:  Ramez Jensen :  1940  Gender:  male  MRN #:  [id number]    Assessment:     Ramez Jensen is a 78y.o. year old male with ongoing CKD baseline cr of 2.3 range. Has been admitted with anasarca, fluid weight gain  over 2 weeks  He developed acute renal failure with progressive rise in creatinine of unknown etiology   CT scan of abdomen showed : cirrhotic morphology with moderate ascites , most likely from portal hypertension , no evidence of SBP   Does not have significant hematuria and proteinuria to suspect GN     USG is negative for obstructive uropathy   Renal artery doppler is inconclusive due to body habitus/bowel gas. 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 Ideally treatment of secondary IgA nephropathy is the treatment of cause .  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 with his haematologist Dr Betty Shah  who thinks his prognosis is overall poor and not candidate for surgical intervention/chemo or immuno suppressive and says he will be seen tomorrow to explain prognosis      He has decent  urine output overnight without diuretics, creatinine and BUN is still significantly elevated but now down trending , no urgent need of dialysis for  now   He is agreeable to start dialysis should be needs it , continue  iv albumin to 25 gram q 6 hourly   - Hold off diuretics  - Hep B surface antigen/e Antigen/Hep C antibody is  negative, SHIVANI- negative, ANCA- negatve, SPEP- pending      Less than 2 gram salt   Intake and output   Dose all meds for current EGFR     Discussed with the patient and his wife in detail about dialysis and renal prognosis          Subjective/Interval Events    Saw him after EGD   Says he is fine  , no shortness of breath, nausea, vomiting, loss of appetite, confusion   Urinating fine   Still has decent LE edema     Blood pressure 123/62, pulse 85, temperature 97.7 °F (36.5 °C), resp. rate 16, height 6' (1.829 m), weight 124.8 kg (275 lb 2 oz), SpO2 100 %. Exam:    Pt awake and alert  Lung: clear to auscultation  Heart: s1s2 regualr no rubs or murmur  normal bowel sounds. Ext: b/l LE edema up to thigh   CNS- Oriented to time , place and person       Recent Labs     11/23/22  0158   WBC 1.4*   HCT 21.2*   .0*       Recent Labs     11/23/22  0158      CO2 22   BUN 96*       No results for input(s): ALBUMIN, AGRAT in the last 72 hours. No lab exists for component: TOTPR, SGOTAST, ALKPHOS, BILIT, BILID, SGPTALT, GLOBULIN    USG retroperitoneum: - Increased left and right renal cortical echogenicity, suggestive of chronic  medical renal disease.   No obstructive uropathy or acute sonographic  abnormality. Mild dependent changes of atelectasis and peribronchial areas of groundglass  density which may reflect a manifestation of small airways disease or  potentially alveolar edema    > No evidence of pleural effusion. 2. Within the abdomen/pelvis:     > Cirrhotic hepatic morphology and signal of portal hypertension with small  moderate amount of ascites.     > No abnormal bowel wall thickening or inflammatory changes. Prior ileocolonic  resection and reanastomosis. CT abdomen/pelvis: -     Renal artery doppler: 11/16/22:- Inadequate study secondary to body habitus and increase bowel gas. Unable to document any color flow on left kidney and renal artery bilaterally. Medical Decision Making :    I have reviewed patient's record for pertinent labs, radiology and other test . I have reviewed old records. I have ordered labs, medications and radiology as necessary and indicated per patient's condition . Total time spent is approximately 35minutes. Greater than 50 % of that time was spent in counseling and or care coordination.      Tiffany Girard MD  Nephrology -[facility], [facility].

## 2022-11-24 LAB
ANION GAP SERPL CALC-SCNC: 13 MMOL/L (ref 3–18)
BASOPHILS # BLD: 0 K/UL (ref 0–0.1)
BASOPHILS NFR BLD: 1 % (ref 0–2)
BUN SERPL-MCNC: 91 MG/DL (ref 7–18)
BUN/CREAT SERPL: 16 (ref 12–20)
CALCIUM SERPL-MCNC: 8.6 MG/DL (ref 8.5–10.1)
CHLORIDE SERPL-SCNC: 108 MMOL/L (ref 100–111)
CO2 SERPL-SCNC: 22 MMOL/L (ref 21–32)
CREAT SERPL-MCNC: 5.6 MG/DL (ref 0.6–1.3)
DIFFERENTIAL METHOD BLD: ABNORMAL
EOSINOPHIL # BLD: 0.1 K/UL (ref 0–0.4)
EOSINOPHIL NFR BLD: 6 % (ref 0–5)
ERYTHROCYTE [DISTWIDTH] IN BLOOD BY AUTOMATED COUNT: 13.8 % (ref 11.6–14.5)
GLUCOSE BLD STRIP.AUTO-MCNC: 120 MG/DL (ref 70–110)
GLUCOSE BLD STRIP.AUTO-MCNC: 137 MG/DL (ref 70–110)
GLUCOSE BLD STRIP.AUTO-MCNC: 141 MG/DL (ref 70–110)
GLUCOSE BLD STRIP.AUTO-MCNC: 188 MG/DL (ref 70–110)
GLUCOSE SERPL-MCNC: 140 MG/DL (ref 74–99)
HCT VFR BLD AUTO: 21.7 % (ref 36–48)
HGB BLD-MCNC: 7.2 G/DL (ref 13–16)
IMM GRANULOCYTES # BLD AUTO: 0 K/UL (ref 0–0.04)
IMM GRANULOCYTES NFR BLD AUTO: 1 % (ref 0–0.5)
LYMPHOCYTES # BLD: 0.3 K/UL (ref 0.9–3.6)
LYMPHOCYTES NFR BLD: 19 % (ref 21–52)
MCH RBC QN AUTO: 35.1 PG (ref 24–34)
MCHC RBC AUTO-ENTMCNC: 33.2 G/DL (ref 31–37)
MCV RBC AUTO: 105.9 FL (ref 78–100)
MONOCYTES # BLD: 0.1 K/UL (ref 0.05–1.2)
MONOCYTES NFR BLD: 9 % (ref 3–10)
NEUTS SEG # BLD: 1.1 K/UL (ref 1.8–8)
NEUTS SEG NFR BLD: 66 % (ref 40–73)
NRBC # BLD: 0 K/UL (ref 0–0.01)
NRBC BLD-RTO: 0 PER 100 WBC
PLATELET # BLD AUTO: 48 K/UL (ref 135–420)
PMV BLD AUTO: 10.8 FL (ref 9.2–11.8)
POTASSIUM SERPL-SCNC: 3.1 MMOL/L (ref 3.5–5.5)
RBC # BLD AUTO: 2.05 M/UL (ref 4.35–5.65)
SODIUM SERPL-SCNC: 143 MMOL/L (ref 136–145)
WBC # BLD AUTO: 1.6 K/UL (ref 4.6–13.2)

## 2022-11-24 PROCEDURE — 74011250637 HC RX REV CODE- 250/637: Performed by: INTERNAL MEDICINE

## 2022-11-24 PROCEDURE — C9399 UNCLASSIFIED DRUGS OR BIOLOG: HCPCS | Performed by: STUDENT IN AN ORGANIZED HEALTH CARE EDUCATION/TRAINING PROGRAM

## 2022-11-24 PROCEDURE — 77010033678 HC OXYGEN DAILY

## 2022-11-24 PROCEDURE — 82962 GLUCOSE BLOOD TEST: CPT

## 2022-11-24 PROCEDURE — 74011636637 HC RX REV CODE- 636/637: Performed by: INTERNAL MEDICINE

## 2022-11-24 PROCEDURE — 85025 COMPLETE CBC W/AUTO DIFF WBC: CPT

## 2022-11-24 PROCEDURE — 74011250636 HC RX REV CODE- 250/636: Performed by: ANESTHESIOLOGY

## 2022-11-24 PROCEDURE — 74011250636 HC RX REV CODE- 250/636: Performed by: STUDENT IN AN ORGANIZED HEALTH CARE EDUCATION/TRAINING PROGRAM

## 2022-11-24 PROCEDURE — 65270000046 HC RM TELEMETRY

## 2022-11-24 PROCEDURE — 74011000250 HC RX REV CODE- 250: Performed by: ANESTHESIOLOGY

## 2022-11-24 PROCEDURE — 80048 BASIC METABOLIC PNL TOTAL CA: CPT

## 2022-11-24 PROCEDURE — 36415 COLL VENOUS BLD VENIPUNCTURE: CPT

## 2022-11-24 RX ADMIN — SODIUM CHLORIDE, PRESERVATIVE FREE 10 ML: 5 INJECTION INTRAVENOUS at 15:48

## 2022-11-24 RX ADMIN — Medication 2 TABLET: at 09:15

## 2022-11-24 RX ADMIN — SODIUM CHLORIDE, POTASSIUM CHLORIDE, SODIUM LACTATE AND CALCIUM CHLORIDE 1000 ML: 600; 310; 30; 20 INJECTION, SOLUTION INTRAVENOUS at 12:40

## 2022-11-24 RX ADMIN — CETIRIZINE HYDROCHLORIDE 10 MG: 10 TABLET, FILM COATED ORAL at 21:55

## 2022-11-24 RX ADMIN — DOXAZOSIN MESYLATE 4 MG: 4 TABLET ORAL at 21:55

## 2022-11-24 RX ADMIN — SODIUM CHLORIDE, PRESERVATIVE FREE 10 ML: 5 INJECTION INTRAVENOUS at 21:56

## 2022-11-24 RX ADMIN — Medication 2 UNITS: at 11:58

## 2022-11-24 RX ADMIN — ALBUMIN HUMAN 25 G: 0.25 SOLUTION INTRAVENOUS at 02:53

## 2022-11-24 RX ADMIN — ALBUMIN HUMAN 25 G: 0.25 SOLUTION INTRAVENOUS at 20:36

## 2022-11-24 RX ADMIN — ALBUMIN HUMAN 25 G: 0.25 SOLUTION INTRAVENOUS at 15:42

## 2022-11-24 RX ADMIN — Medication 2 TABLET: at 20:36

## 2022-11-24 RX ADMIN — ALBUMIN HUMAN 25 G: 0.25 SOLUTION INTRAVENOUS at 09:14

## 2022-11-24 RX ADMIN — FERROUS SULFATE TAB 325 MG (65 MG ELEMENTAL FE) 325 MG: 325 (65 FE) TAB at 17:10

## 2022-11-24 RX ADMIN — FERROUS SULFATE TAB 325 MG (65 MG ELEMENTAL FE) 325 MG: 325 (65 FE) TAB at 09:15

## 2022-11-24 NOTE — PROGRESS NOTES
Phone: [phone]  Paging : [phone]      Hematology / Oncology Progress Note    Admit Date: 11/12/2022    Assessment:     Hem/Onc Issues: Thrombocytopenia, ITP, did not respond to steroid (stopped 10/31/2022); on Promacta since 11/7/2022, Repeat Bone marrow biopsy 10/20/2022   Left kidney renal paillary cell carcinoma, incidental finding on kidney biopsy, not good candidate for surgery. Reasons for Admission:  LE edema and SOB, Acute on chronic renal failure. Acute tubular necrosis , IgA nephropathy , senile amyloidosis in arterioles and ATN with oxalate in some arterioles and  With moderate interstitial fibrosis and tubular atrophy   Other Active Issues:    Crohn's disease  Acute on chronic renal failure    Principal Problem:    Acute renal failure superimposed on stage 3 chronic kidney disease ([address] (11/12/2022)    Active Problems: Aortic stenosis (10/7/2022)      Peripheral edema (11/12/2022)      Hypoxia (11/12/2022)      Papillary renal cell carcinoma ([facility] [address] (11/23/2022)      Cirrhosis ([facility] [address] (11/23/2022)      Plan:     I had a lengthy discussion with the patient and wife about the diagnosis and treatment options of the 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 He does not appear to be a candidate of surgery or systemic therapy. The cancer is indolent in nature, at this point is not a life-liming factor for him. Would observe. He will need to decide if he will commit to hemodialysis. OK to continue promacta, may use home meds, just started 11/7/2022  Outpatient follow up with VOANTHONY. Other management per primary team and nephrology eam    Time spent: 35 min.    Subjectives:     Admitted with SOB, edema, acute on chronic renal failure  Denies fever chills bleeding    Objectives:      VITAL SIGNS: Patient Vitals for the past 24 hrs:   BP Temp Pulse Resp SpO2 Weight   11/24/22 0723 (!) 117/53 98.4 °F (36.9 °C) 74 18 98 % --   11/24/22 0614 114/63 98.6 °F (37 °C) 76 16 98 % -- 11/24/22 0004 123/69 98 °F (36.7 °C) 82 16 99 % 121.6 kg (268 lb)   11/23/22 2104 120/69 -- 89 -- -- --   11/23/22 2040 120/69 97.8 °F (36.6 °C) 89 16 99 % --   11/23/22 1532 107/62 97.4 °F (36.3 °C) 82 16 99 % --   11/23/22 1141 123/62 97.7 °F (36.5 °C) 85 16 100 % --     GENERAL: normal appearance, no acute distress. HEENT: conjunctivae normal, eyelids normal, anicteric, external ears and nose normal, hearing grossly normal.   NECK: supple, no masses. LUNG: breathing comfortable. CARDIOVASCULAR: 1+ peripheral edema. ABDOMEN: soft. non-tender  PELVIS: pelvic exam deferred. RECTUM: rectal exam deferred. LYMPH NODES: no cervical adenopathy, or supraclavicular adenopathy  SKIN: no rash, minimal petechiae, no ecchymosis, no cutaneous nodules. MUSCULOSKELETAL: normal muscle strength. NEUROLOGIC: no focal motor deficit,  no abnormal mental status. PSYCHIATRIC: oriented to person, time and place, mood and affect appropriate.       Medications:      Current Medications:    Current Facility-Administered Medications   Medication Dose Route Frequency    simethicone (MYLICON) 15DQ/9.4IF oral drops 80 mg  1.2 mL Oral Multiple    lactated Ringers infusion 1,000 mL  1,000 mL IntraVENous CONTINUOUS    sodium chloride (NS) flush 5-40 mL  5-40 mL IntraVENous Q8H    naloxone (NARCAN) injection 0.1 mg  0.1 mg IntraVENous PRN    flumazeniL (ROMAZICON) 0.1 mg/mL injection 0.2 mg  0.2 mg IntraVENous Multiple    albumin, human-kjda 25% (ALBUMINEX) intravenous solution 25 g  25 g IntraVENous Q6H    sodium chloride (NS) flush 5-40 mL  5-40 mL IntraVENous PRN    doxazosin (CARDURA) tablet 4 mg  4 mg Oral QHS    ferrous sulfate tablet 325 mg  1 Tablet Oral BID WITH MEALS    cetirizine (ZYRTEC) tablet 10 mg  10 mg Oral QHS    insulin lispro (HUMALOG) injection   SubCUTAneous AC&HS    glucose chewable tablet 16 g  4 Tablet Oral PRN    glucagon (GLUCAGEN) injection 1 mg  1 mg IntraMUSCular PRN    dextrose 10% infusion 0-250 mL  0-250 mL IntraVENous PRN    eltrombopag (PROMACTA) tablet 50 mg (Patient Supplied)  50 mg Oral ACB    Lactobacillus Acidoph & Bulgar (FLORANEX) tablet 2 Tablet  2 Tablet Oral BID       Allergies:    No Known Allergies      Ancillary Study Results:      Laboratory:    Recent Results (from the past 24 hour(s))   GLUCOSE, POC    Collection Time: 11/23/22 11:41 AM   Result Value Ref Range    Glucose (POC) 138 (H) 70 - 110 mg/dL   GLUCOSE, POC    Collection Time: 11/23/22  3:31 PM   Result Value Ref Range    Glucose (POC) 179 (H) 70 - 110 mg/dL   GLUCOSE, POC    Collection Time: 11/23/22  9:38 PM   Result Value Ref Range    Glucose (POC) 173 (H) 70 - 110 mg/dL   CBC WITH AUTOMATED DIFF    Collection Time: 11/24/22  1:44 AM   Result Value Ref Range    WBC 1.6 (L) 4.6 - 13.2 K/uL    RBC 2.05 (L) 4.35 - 5.65 M/uL    HGB 7.2 (L) 13.0 - 16.0 g/dL    HCT 21.7 (L) 36.0 - 48.0 %    .9 (H) 78.0 - 100.0 FL    MCH 35.1 (H) 24.0 - 34.0 PG    MCHC 33.2 31.0 - 37.0 g/dL    RDW 13.8 11.6 - 14.5 %    PLATELET 48 (L) 302 - 420 K/uL    MPV 10.8 9.2 - 11.8 FL    NRBC 0.0 0  WBC    ABSOLUTE NRBC 0.00 0.00 - 0.01 K/uL    NEUTROPHILS 66 40 - 73 %    LYMPHOCYTES 19 (L) 21 - 52 %    MONOCYTES 9 3 - 10 %    EOSINOPHILS 6 (H) 0 - 5 %    BASOPHILS 1 0 - 2 %    IMMATURE GRANULOCYTES 1 (H) 0.0 - 0.5 %    ABS. NEUTROPHILS 1.1 (L) 1.8 - 8.0 K/UL    ABS. LYMPHOCYTES 0.3 (L) 0.9 - 3.6 K/UL    ABS. MONOCYTES 0.1 0.05 - 1.2 K/UL    ABS. EOSINOPHILS 0.1 0.0 - 0.4 K/UL    ABS. BASOPHILS 0.0 0.0 - 0.1 K/UL    ABS. IMM.  GRANS. 0.0 0.00 - 0.04 K/UL    DF AUTOMATED     METABOLIC PANEL, BASIC    Collection Time: 11/24/22  1:44 AM   Result Value Ref Range    Sodium 143 136 - 145 mmol/L    Potassium 3.1 (L) 3.5 - 5.5 mmol/L    Chloride 108 100 - 111 mmol/L    CO2 22 21 - 32 mmol/L    Anion gap 13 3.0 - 18 mmol/L    Glucose 140 (H) 74 - 99 mg/dL    BUN 91 (H) 7.0 - 18 MG/DL    Creatinine 5.60 (H) 0.6 - 1.3 MG/DL    BUN/Creatinine ratio 16 12 - 20 eGFR 9 (L) >60 ml/min/1.73m2    Calcium 8.6 8.5 - 10.1 MG/DL   GLUCOSE, POC    Collection Time: 11/24/22  7:26 AM   Result Value Ref Range    Glucose (POC) 120 (H) 70 - 110 mg/dL   GLUCOSE, POC    Collection Time: 11/24/22 11:17 AM   Result Value Ref Range    Glucose (POC) 188 (H) 70 - 110 mg/dL         Radiology:   XR CHEST PORT    Result Date: 11/12/2022  EXAM: One view chest x-ray CLINICAL INDICATION/HISTORY: Dyspnea and lower extremity swelling. COMPARISON: 09/06/2022. TECHNIQUE: Single AP view of the chest was obtained. _______________ FINDINGS: HEART, VESSELS, MEDIASTINUM: Heart size is stable. No vascular congestion. There is atherosclerosis of the thoracic aorta. LUNGS, PLEURAL SPACES: The lungs are clear. No effusion or pneumothorax. BONY THORAX, SOFT TISSUES: Unremarkable. _______________     No acute cardiopulmonary process.             Emili Landrum MD, PhD  Phone: [phone]  Pager: [phone]

## 2022-11-24 NOTE — PROGRESS NOTES
1043: Pt sitting in chair upon arrival, refusing PT at this time, will follow up as schedule permits. 1205: Pt eating lunch, spouse visiting, will follow up as schedule permits.

## 2022-11-24 NOTE — PROGRESS NOTES
Patient:  Kimberly Verdugo :  1940  Gender:  male  MRN #:  [id number]    Assessment:     Kimberly Verdugo is a 78y.o. year old male with ongoing CKD baseline cr of 2.3 range. Has been admitted with anasarca, fluid weight gain  over 2 weeks  He developed acute renal failure with progressive rise in creatinine of unknown etiology   CT scan of abdomen showed : cirrhotic morphology with moderate ascites , most likely from portal hypertension , no evidence of SBP   Does not have significant hematuria and proteinuria to suspect GN     USG is negative for obstructive uropathy   Renal artery doppler is inconclusive due to body habitus/bowel gas. 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 Ideally treatment of secondary IgA nephropathy is the treatment of cause .  At this point I don't think he will benefit from any sort of immunosuppressive treatment or long term steroid which would poses more harm than risk due to pancytopenia/portal hypertension and ulcer in intestine  and low grade malignancy in renal biopsy   Most likely amyloid deposition in kidney is secondary to chronic inflammation/ulcer/Crohn's disease , immunohisto chemistry is negative for AA amyloid, SPEP/BAILEY is pending   Appreciate hematology/Oncology recs ,stated he is not candidate for surgery and systemic therapy       He has decent  urine output overnight without diuretics, creatinine and BUN is still significantly elevated but now down trending , no urgent need of dialysis for  now   He is agreeable to start dialysis should be needs it , continue  iv albumin to 25 gram q 6 hourly   - Hold off diuretics  - Hep B surface antigen/e Antigen/Hep C antibody is  negative, SHIVANI- negative, ANCA- negatve, SPEP- pending      Less than 2 gram salt   Intake and output   Dose all meds for current EGFR     Discussed with the patient and his wife in detail about dialysis and renal prognosis          Subjective/Interval Events    Saw him after EGD   Says he is fine  , no shortness of breath, nausea, vomiting, loss of appetite, confusion   Urinating fine   Still has decent LE edema     Blood pressure (!) 114/57, pulse 76, temperature 97.7 °F (36.5 °C), resp. rate 18, height 6' (1.829 m), weight 121.6 kg (268 lb), SpO2 100 %. Exam:    Pt awake and alert  Lung: clear to auscultation  Ext: b/l LE edema up to thigh   CNS- Oriented to time , place and person       Recent Labs     11/24/22  0144   WBC 1.6*   HCT 21.7*   .9*       Recent Labs     11/24/22  0144      CO2 22   BUN 91*       No results for input(s): ALBUMIN, AGRAT in the last 72 hours. No lab exists for component: TOTPR, SGOTAST, ALKPHOS, BILIT, BILID, SGPTALT, GLOBULIN    USG retroperitoneum: - Increased left and right renal cortical echogenicity, suggestive of chronic  medical renal disease. No obstructive uropathy or acute sonographic  abnormality.      Mild dependent changes of atelectasis and peribronchial areas of groundglass  density which may reflect a manifestation of small airways disease or  potentially alveolar edema    > No evidence of pleural effusion. 2. Within the abdomen/pelvis:     > Cirrhotic hepatic morphology and signal of portal hypertension with small  moderate amount of ascites.     > No abnormal bowel wall thickening or inflammatory changes. Prior ileocolonic  resection and reanastomosis. CT abdomen/pelvis: -     Renal artery doppler: 11/16/22:- Inadequate study secondary to body habitus and increase bowel gas. Unable to document any color flow on left kidney and renal artery bilaterally. Medical Decision Making :    I have reviewed patient's record for pertinent labs, radiology and other test . I have reviewed old records. I have ordered labs, medications and radiology as necessary and indicated per patient's condition . Total time spent is approximately 28 minutes. Greater than 50 % of that time was spent in counseling and or care coordination.      Zoya Avalos MD  Nephrology -[place] [id number]

## 2022-11-24 NOTE — PROGRESS NOTES
Hospitalist Progress Note    Patient: Melissa Sawant. MRN: [id number]  CSN: [id number]    YOB: 1940  Age: 78 y.o. Sex: male    DOA: 11/12/2022 LOS:  LOS: 12 days                Assessment and Plan:    Principal Problem:    Acute renal failure superimposed on stage 3 chronic kidney disease ([address] (11/12/2022)    Active Problems: Aortic stenosis (10/7/2022)      Peripheral edema (11/12/2022)      Hypoxia (11/12/2022)      Papillary renal cell carcinoma ([address] (11/23/2022)      Cirrhosis (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 Creatinine has been stable        Cirrhosis with ascites   Dr. Owen Youngblood seeing pt and upper egd done EGD demonstrated portal gastropathy   Post paracentesis   Considering transjugular liver biopsy to see if also has amyloid  Continue albumin infusion      Anasarca   Due to liver and renal disease   Continue albumin p  ossible hd per nephrology       Uti completed abx      Severe aortic stenosis  Cardiologist saw pt to clear for liver biopsy     Pancytopenia transfuse as needed : wee are watching daily        Chief complaint:   renal cancer , cirrhosis , edema , napoleon on ckd3      Subjective:    Feels pretty good      Review of systems:    General: No fevers or chills. Cardiovascular: No chest pain or pressure. No palpitations. Pulmonary: No shortness of breath. Gastrointestinal: No nausea, vomiting. Objective:    Vital signs/Intake and Output:  Visit Vitals  BP (!) 117/53   Pulse 74   Temp 98.4 °F (36.9 °C)   Resp 18   Ht 6' (1.829 m)   Wt 121.6 kg (268 lb)   SpO2 98%   BMI 36.35 kg/m²     Current Shift:  No intake/output data recorded.   Last three shifts: 11/22 1901 - 11/24 0700  In: 300 [I.V.:300]  Out: 1500 [Urine:1500]    Physical Exam:  General: NAD, AAOx3. Non-toxic. HEENT: NC/AT. PERRLA, EOMI.  MMM. Lungs: Nml inspection. CTA B/L. No wheezing, rales or rhonchi. Heart:  S1S2 RRR,  PMI mid 5th IC space. No M/RG. Abdomen: Soft, NT/ND.  BS+. No peritoneal signs. Extremities: No C/C/E. Psych:   Nml affect. Neurologic:  2-12 intact. Strength 5/5 throughout. Sensation symmetrical.          Labs: Results:       Chemistry Recent Labs     11/24/22 0144 11/23/22  0158 11/22/22  0540   * 117* 118*    142 141   K 3.1* 3.4* 3.1*    107 108   CO2 22 22 23   BUN 91* 96* 101*   CREA 5.60* 5.89* 6.24*   CA 8.6 8.6 8.4*   AGAP 13 13 10   BUCR 16 16 16      CBC w/Diff Recent Labs     11/24/22  0144 11/23/22  0158 11/22/22  0540   WBC 1.6* 1.4* 1.5*   RBC 2.05* 2.02* 2.04*   HGB 7.2* 7.1* 7.1*   HCT 21.7* 21.2* 21.3*   PLT 48* 49* 50*   GRANS 66 64 66   LYMPH 19* 19* 18*   EOS 6* 4 5      Cardiac Enzymes No results for input(s): CPK, CKND1, CESAR in the last 72 hours. No lab exists for component: CKRMB, TROIP   Coagulation No results for input(s): PTP, INR, APTT, INREXT in the last 72 hours. Lipid Panel Lab Results   Component Value Date/Time    Cholesterol, total 102 10/07/2022 03:45 AM    HDL Cholesterol 42 10/07/2022 03:45 AM    LDL, calculated 47.6 10/07/2022 03:45 AM    VLDL, calculated 12.4 10/07/2022 03:45 AM    Triglyceride 62 10/07/2022 03:45 AM    CHOL/HDL Ratio 2.4 10/07/2022 03:45 AM      BNP No results for input(s): BNPP in the last 72 hours. Liver Enzymes No results for input(s): TP, ALB, TBIL, AP in the last 72 hours.     No lab exists for component: SGOT, GPT, DBIL   Thyroid Studies No results found for: T4, T3U, TSH, TSHEXT     Procedures/imaging: see electronic medical records for all procedures/Xrays and details which were not copied into this note but were reviewed prior to creation of Plan

## 2022-11-25 LAB
ALBUMIN SERPL-MCNC: 3.6 G/DL (ref 3.4–5)
ALBUMIN/GLOB SERPL: 2 {RATIO} (ref 0.8–1.7)
ALP SERPL-CCNC: 45 U/L (ref 45–117)
ALT SERPL-CCNC: 19 U/L (ref 16–61)
ANION GAP SERPL CALC-SCNC: 11 MMOL/L (ref 3–18)
AST SERPL-CCNC: 19 U/L (ref 10–38)
BASOPHILS # BLD: 0 K/UL (ref 0–0.1)
BASOPHILS NFR BLD: 0 % (ref 0–2)
BILIRUB SERPL-MCNC: 1.1 MG/DL (ref 0.2–1)
BUN SERPL-MCNC: 90 MG/DL (ref 7–18)
BUN/CREAT SERPL: 17 (ref 12–20)
CALCIUM SERPL-MCNC: 8.5 MG/DL (ref 8.5–10.1)
CHLORIDE SERPL-SCNC: 109 MMOL/L (ref 100–111)
CO2 SERPL-SCNC: 22 MMOL/L (ref 21–32)
CREAT SERPL-MCNC: 5.3 MG/DL (ref 0.6–1.3)
DIFFERENTIAL METHOD BLD: ABNORMAL
EOSINOPHIL # BLD: 0.1 K/UL (ref 0–0.4)
EOSINOPHIL NFR BLD: 5 % (ref 0–5)
ERYTHROCYTE [DISTWIDTH] IN BLOOD BY AUTOMATED COUNT: 13.6 % (ref 11.6–14.5)
GLOBULIN SER CALC-MCNC: 1.8 G/DL (ref 2–4)
GLUCOSE BLD STRIP.AUTO-MCNC: 143 MG/DL (ref 70–110)
GLUCOSE BLD STRIP.AUTO-MCNC: 188 MG/DL (ref 70–110)
GLUCOSE BLD STRIP.AUTO-MCNC: 204 MG/DL (ref 70–110)
GLUCOSE SERPL-MCNC: 117 MG/DL (ref 74–99)
HCT VFR BLD AUTO: 21.9 % (ref 36–48)
HGB BLD-MCNC: 7.3 G/DL (ref 13–16)
IMM GRANULOCYTES # BLD AUTO: 0 K/UL (ref 0–0.04)
IMM GRANULOCYTES NFR BLD AUTO: 0 % (ref 0–0.5)
LYMPHOCYTES # BLD: 0.3 K/UL (ref 0.9–3.6)
LYMPHOCYTES NFR BLD: 22 % (ref 21–52)
MCH RBC QN AUTO: 35.3 PG (ref 24–34)
MCHC RBC AUTO-ENTMCNC: 33.3 G/DL (ref 31–37)
MCV RBC AUTO: 105.8 FL (ref 78–100)
MONOCYTES # BLD: 0.1 K/UL (ref 0.05–1.2)
MONOCYTES NFR BLD: 10 % (ref 3–10)
NEUTS SEG # BLD: 0.9 K/UL (ref 1.8–8)
NEUTS SEG NFR BLD: 63 % (ref 40–73)
NRBC # BLD: 0 K/UL (ref 0–0.01)
NRBC BLD-RTO: 0 PER 100 WBC
PLATELET # BLD AUTO: 49 K/UL (ref 135–420)
PMV BLD AUTO: 10.5 FL (ref 9.2–11.8)
POTASSIUM SERPL-SCNC: 3.4 MMOL/L (ref 3.5–5.5)
PROT SERPL-MCNC: 5.4 G/DL (ref 6.4–8.2)
RBC # BLD AUTO: 2.07 M/UL (ref 4.35–5.65)
SODIUM SERPL-SCNC: 142 MMOL/L (ref 136–145)
WBC # BLD AUTO: 1.5 K/UL (ref 4.6–13.2)

## 2022-11-25 PROCEDURE — 74011250636 HC RX REV CODE- 250/636: Performed by: STUDENT IN AN ORGANIZED HEALTH CARE EDUCATION/TRAINING PROGRAM

## 2022-11-25 PROCEDURE — 97110 THERAPEUTIC EXERCISES: CPT

## 2022-11-25 PROCEDURE — 36415 COLL VENOUS BLD VENIPUNCTURE: CPT

## 2022-11-25 PROCEDURE — 74011000250 HC RX REV CODE- 250: Performed by: RADIOLOGY

## 2022-11-25 PROCEDURE — 65270000046 HC RM TELEMETRY

## 2022-11-25 PROCEDURE — 74011250637 HC RX REV CODE- 250/637: Performed by: INTERNAL MEDICINE

## 2022-11-25 PROCEDURE — 97116 GAIT TRAINING THERAPY: CPT

## 2022-11-25 PROCEDURE — 80053 COMPREHEN METABOLIC PANEL: CPT

## 2022-11-25 PROCEDURE — 74011636637 HC RX REV CODE- 636/637: Performed by: INTERNAL MEDICINE

## 2022-11-25 PROCEDURE — 82962 GLUCOSE BLOOD TEST: CPT

## 2022-11-25 PROCEDURE — 74011000250 HC RX REV CODE- 250: Performed by: ANESTHESIOLOGY

## 2022-11-25 PROCEDURE — 85025 COMPLETE CBC W/AUTO DIFF WBC: CPT

## 2022-11-25 PROCEDURE — C9399 UNCLASSIFIED DRUGS OR BIOLOG: HCPCS | Performed by: STUDENT IN AN ORGANIZED HEALTH CARE EDUCATION/TRAINING PROGRAM

## 2022-11-25 PROCEDURE — 97530 THERAPEUTIC ACTIVITIES: CPT

## 2022-11-25 RX ADMIN — Medication 2 TABLET: at 10:23

## 2022-11-25 RX ADMIN — FERROUS SULFATE TAB 325 MG (65 MG ELEMENTAL FE) 325 MG: 325 (65 FE) TAB at 10:23

## 2022-11-25 RX ADMIN — ALBUMIN HUMAN 12.5 G: 0.25 SOLUTION INTRAVENOUS at 15:21

## 2022-11-25 RX ADMIN — ALBUMIN HUMAN 25 G: 0.25 SOLUTION INTRAVENOUS at 02:43

## 2022-11-25 RX ADMIN — Medication 2 TABLET: at 22:13

## 2022-11-25 RX ADMIN — DOXAZOSIN MESYLATE 4 MG: 4 TABLET ORAL at 22:00

## 2022-11-25 RX ADMIN — SODIUM CHLORIDE, PRESERVATIVE FREE 10 ML: 5 INJECTION INTRAVENOUS at 06:00

## 2022-11-25 RX ADMIN — SODIUM CHLORIDE, PRESERVATIVE FREE 10 ML: 5 INJECTION INTRAVENOUS at 22:14

## 2022-11-25 RX ADMIN — Medication 4 UNITS: at 12:16

## 2022-11-25 RX ADMIN — Medication 2 UNITS: at 16:12

## 2022-11-25 RX ADMIN — CETIRIZINE HYDROCHLORIDE 10 MG: 10 TABLET, FILM COATED ORAL at 22:13

## 2022-11-25 RX ADMIN — SODIUM CHLORIDE, PRESERVATIVE FREE 10 ML: 5 INJECTION INTRAVENOUS at 15:22

## 2022-11-25 RX ADMIN — FERROUS SULFATE TAB 325 MG (65 MG ELEMENTAL FE) 325 MG: 325 (65 FE) TAB at 16:12

## 2022-11-25 RX ADMIN — ALBUMIN HUMAN 25 G: 0.25 SOLUTION INTRAVENOUS at 22:13

## 2022-11-25 NOTE — PROGRESS NOTES
Problem: Mobility Impaired (Adult and Pediatric)  Goal: *Acute Goals and Plan of Care (Insert Text)  Description: Physical Therapy Goals   Initiated 11/14/2022 and to be accomplished within 7 day(s)  1. Patient will move from supine <> sit with SBA in prep for out of bed activity and change of position. 2.  Patient will perform sit<> stand with SBA with RW in prep for transfers/ambulation. 3.  Patient will ambulate 50 feet with S/RW for improved functional mobility at discharge. 4.  Patient will ascend/descend 1stairs with handrail(s) with minimal assistance/contact guard assist for home re-entry as needed. Reviewed and updated 11/21/2022 and to be accomplished within 7 day(s)  1. Patient will move from supine <> sit with supervision in prep for out of bed activity and change of position. (Upgraded)  2. Patient will perform sit<> stand with supervision with RW in prep for transfers/ambulation. (Upgraded)  3. Patient will ambulate 150 feet with Supervision, RW for improved functional mobility at discharge. (Upgraded)  4. Patient will ascend/descend 1stairs with handrail(s) with minimal assistance/contact guard assist for home re-entry as needed. (D/c goal, pt only has threshold entry)              Note:   PHYSICAL THERAPY TREATMENT    Patient: Melissa Henderson (78 y.o. male)  Date: 11/25/2022  Diagnosis: Hypoxia [R09.02]  NAPOLEON (acute kidney injury) ([facility] [address] [N17.9]  Aortic stenosis [I35.0]  Peripheral edema [R60.9] Acute renal failure superimposed on stage 3 chronic kidney disease (HCC)  Procedure(s) (LRB):  ESOPHAGOGASTRODUODENOSCOPY (EGD) (N/A) 2 Days Post-Op  Precautions: Fall    ASSESSMENT:  Pt seated in chair upon arrival. Pt performed seated there ex for LE strengthening. Pt required Klaus to perform stand transfer from low chair surface. At home pt has recliner chair up on wooden frame to elevate seat height. Pt ambulated 120 ft with RW and CGA.  Pt slightly fatigued upon returning to chair, recovered quickly. Will continue to progress mobility as pt tolerates. Progression toward goals:   [x]      Improving appropriately and progressing toward goals  []      Improving slowly and progressing toward goals  []      Not making progress toward goals and plan of care will be adjusted     PLAN:  Patient continues to benefit from skilled intervention to address the above impairments. Continue treatment per established plan of care. Discharge Recommendations: Home Health  Further Equipment Recommendations for Discharge:  pt has RW delivered to room    AMPAC: 15/20    This AMPA score should be considered in conjunction with interdisciplinary team recommendations to determine the most appropriate discharge setting. Patient's social support, diagnosis, medical stability, and prior level of function should also be taken into consideration. SUBJECTIVE:   Patient stated Ana Maria Chakraborty told me a have cancer, but you know it is what it is.     OBJECTIVE DATA SUMMARY:   Critical Behavior:  Neurologic State: Alert  Orientation Level: Oriented X4  Cognition: Appropriate decision making, Appropriate safety awareness, Appropriate for age attention/concentration  Safety/Judgement: Fall prevention  Functional Mobility Training:  Transfers:  Sit to Stand: Minimum assistance  Stand to Sit: Minimum assistance  Balance:  Sitting: Intact  Standing: Intact; With support   Ambulation/Gait Training:  Distance (ft): 120 Feet (ft)  Assistive Device: Gait belt;Walker, rolling  Ambulation - Level of Assistance: Contact guard assistance  Gait Abnormalities: Decreased step clearance  Base of Support: Widened   Speed/Sandra: Slow  Therapeutic Exercises:   Seated there ex: ankle DF/PF, LAQ, marches, hip abd/add, x10 reps ea  Pain:  Pain level pre-treatment: 0/10  Pain level post-treatment: 0/10     Activity Tolerance:   Good  Please refer to the flowsheet for vital signs taken during this treatment.   After treatment:   [x] Patient left in no apparent distress sitting up in chair  [] Patient left in no apparent distress in bed  [x] Call bell left within reach  [x] Nursing notified  [] Caregiver present  [] Bed alarm activated  [] SCDs applied      COMMUNICATION/EDUCATION:   [x]         Role of Physical Therapy in the acute care setting. [x]         Fall prevention education was provided and the patient/caregiver indicated understanding. [x]         Patient/family have participated as able in working toward goals and plan of care. []         Patient/family agree to work toward stated goals and plan of care. []         Patient understands intent and goals of therapy, but is neutral about his/her participation. []         Patient is unable to participate in stated goals/plan of care: ongoing with therapy staff.  []         Other:        Chana Thomas   Time Calculation: 39 mins    Kala Paiz AM-PAC® Basic Mobility Inpatient Short Form (6-Clicks) Version 2    How much HELP from another person does the patient currently need    (If the patient hasn't done an activity recently, how much help from another person do you think he/she would need if he/she tried?)   Total (Total A or Dep)   A Lot  (Mod to Max A)   A Little (Sup or Min A)   None (Mod I to I)   Turning from your back to your side while in a flat bed without using bedrails? [] 1 [] 2 [x] 3 [] 4   2. Moving from lying on your back to sitting on the side of a flat bed without using bedrails? [] 1 [] 2 [x] 3 [] 4   3. Moving to and from a bed to a chair (including a wheelchair)? [] 1 [] 2 [x] 3 [] 4   4. Standing up from a chair using your arms (e.g., wheelchair, or bedside chair)? [] 1 [] 2 [x] 3 [] 4   5. Walking in hospital room? [] 1 [] 2 [x] 3 [] 4   6. Climbing 3-5 steps with a railing?+   [] 1 [] 2 [] 3 [] 4   +If stair climbing cannot be assessed, skip item #6. Sum responses from items 1-5.

## 2022-11-25 NOTE — PROGRESS NOTES
Patient:  Wilda Thomas. :  1940  Gender:  male  MRN #:  [id number]    Assessment:     Wilda Thomas. is a 78y.o. year old male with ongoing CKD baseline cr of 2.3 range. Has been admitted with anasarca, fluid weight gain  over 2 weeks  He developed acute renal failure with progressive rise in creatinine of unknown etiology   CT scan of abdomen showed : cirrhotic morphology with moderate ascites , most likely from portal hypertension , no evidence of SBP   Does not have significant hematuria and proteinuria to suspect GN     USG is negative for obstructive uropathy   Renal artery doppler is inconclusive due to body habitus/bowel gas. 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 Ideally treatment of secondary IgA nephropathy is the treatment of cause .  At this point I don't think he will benefit from any sort of immunosuppressive treatment or long term steroid which would poses more harm than risk due to pancytopenia/portal hypertension and ulcer in intestine  and low grade malignancy in renal biopsy   Most likely amyloid deposition in kidney is secondary to chronic inflammation/ulcer/Crohn's disease , immunohisto chemistry is negative for AA amyloid, SPEP/BAILEY is pending   Appreciate hematology/Oncology recs ,stated he is not candidate for surgery and systemic therapy        He has decent  urine output overnight without diuretics, creatinine and BUN is still significantly elevated but now down trending very slowly  , no urgent need of dialysis for  now   He is agreeable to start dialysis should be needs it , continue  iv albumin to 25 gram q 6 hourly as long as he is in house    - Hold off diuretics for now, should resume as outpatient after repeat BMP or if he becomes short of breath   - Hep B surface antigen/e Antigen/Hep C antibody is  negative, SHIVANI- negative, ANCA- negatve, SPEP- pending      Less than 2 gram salt   Nutrition consult for education of low oxalate diet , should be on low oxalate due to same deposition in kidney in setting of crohn's diease   Intake and output   Dose all meds for current EGFR     He wasn't to go home , if he leaves today should come to nephrology office next week Monday/Tuesday for renal function check ,if renal functions continues to improve conservative management as he is multiple co morbidities  If worsening will need dialysis . Discussed with the patient and his wife in detail about dialysis and renal prognosis          Subjective/Interval Events    Says he is fine  , no shortness of breath, nausea, vomiting, loss of appetite, confusion   Urinating fine   Still has decent LE edema     Blood pressure (!) 99/52, pulse 92, temperature 97.2 °F (36.2 °C), resp. rate 18, height 6' (1.829 m), weight 122.5 kg (270 lb), SpO2 98 %.     Exam:    Pt awake and alert  Lung: clear to auscultation  Ext: b/l LE edema up to thigh   CNS- Oriented to time , place and person       Recent Labs     11/25/22 0112   WBC 1.5*   HCT 21.9*   .8*       Recent Labs     11/25/22 0112      CO2 22   BUN 90*       Recent Labs     11/25/22 0112 AGRAT 2.0*       USG retroperitoneum: - Increased left and right renal cortical echogenicity, suggestive of chronic  medical renal disease. No obstructive uropathy or acute sonographic  abnormality. Mild dependent changes of atelectasis and peribronchial areas of groundglass  density which may reflect a manifestation of small airways disease or  potentially alveolar edema    > No evidence of pleural effusion. 2. Within the abdomen/pelvis:     > Cirrhotic hepatic morphology and signal of portal hypertension with small  moderate amount of ascites.     > No abnormal bowel wall thickening or inflammatory changes. Prior ileocolonic  resection and reanastomosis. CT abdomen/pelvis: -     Renal artery doppler: 11/16/22:- Inadequate study secondary to body habitus and increase bowel gas. Unable to document any color flow on left kidney and renal artery bilaterally. Medical Decision Making :    I have reviewed patient's record for pertinent labs, radiology and other test . I have reviewed old records. I have ordered labs, medications and radiology as necessary and indicated per patient's condition . Total time spent is approximately 28 minutes. Greater than 50 % of that time was spent in counseling and or care coordination.      Júnior Roberts MD  Nephrology -[facility], [facility].

## 2022-11-25 NOTE — PROGRESS NOTES
Hospitalist Progress Note    Patient: Mariam Correia MRN: [id number]  CSN: [id number]    YOB: 1940  Age: 78 y.o. 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 Creatinine has been stable        Cirrhosis with ascites   Dr. Frances Beavers seeing pt and upper egd done EGD demonstrated portal gastropathy   Post paracentesis   Considering transjugular liver biopsy to see if also has amyloid  Continue albumin infusion      Anasarca   Due to liver and renal disease   Continue albumin p  possible hd per nephrology  although holding off for now       Uti completed abx      Severe aortic stenosis  Cardiologist saw pt to clear for liver biopsy     Pancytopenia transfuse as needed : we are watching daily         He wants to go home. Is now thinking he dosnt want aggressive therapy at all    Chief complaint:   renal cancer , cirrhosis , edema , napoleon on ckd3      Subjective:  Wants to go home    Review of systems:    General: No fevers or chills. Cardiovascular: No chest pain or pressure. No palpitations. Pulmonary: No shortness of breath. Gastrointestinal: No nausea, vomiting.      Objective:    Vital signs/Intake and Output:  Visit Vitals  BP (!) 99/52 (BP 1 Location: Right upper arm, BP Patient Position: Sitting)   Pulse 92   Temp 97.2 °F (36.2 °C)   Resp 18   Ht 6' (1.829 m)   Wt 122.5 kg (270 lb)   SpO2 98%   BMI 36.62 kg/m²     Current Shift:  No intake/output data recorded. Last three shifts:  11/23 1901 - 11/25 0700  In: 2352.5 [P.O.:1200; I.V.:1152.5]  Out: 675 [Urine:675]    Physical Exam:  General: NAD, AAOx3. Non-toxic. HEENT: NC/AT. PERRLA, EOMI.  MMM. Lungs: Nml inspection. CTA B/L. No wheezing, rales or rhonchi. Heart:  S1S2 RRR,  PMI mid 5th IC space. No M/RG. Abdomen: Soft, NT/ND.  BS+. No peritoneal signs. Extremities: No C/C/E. Psych:   Nml affect. Neurologic:  2-12 intact. Strength 5/5 throughout. Sensation symmetrical.          Labs: Results:       Chemistry Recent Labs     11/25/22 0112 11/24/22 0144 11/23/22 0158   * 140* 117*    143 142   K 3.4* 3.1* 3.4*    108 107   CO2 22 22 22   BUN 90* 91* 96*   CREA 5.30* 5.60* 5.89*   CA 8.5 8.6 8.6   AGAP 11 13 13   BUCR 17 16 16   AP 45  --   --    TP 5.4*  --   --    ALB 3.6  --   --    GLOB 1.8*  --   --    AGRAT 2.0*  --   --       CBC w/Diff Recent Labs     11/25/22 0112 11/24/22 0144 11/23/22 0158   WBC 1.5* 1.6* 1.4*   RBC 2.07* 2.05* 2.02*   HGB 7.3* 7.2* 7.1*   HCT 21.9* 21.7* 21.2*   PLT 49* 48* 49*   GRANS 63 66 64   LYMPH 22 19* 19*   EOS 5 6* 4      Cardiac Enzymes No results for input(s): CPK, CKND1, CESAR in the last 72 hours. No lab exists for component: CKRMB, TROIP   Coagulation No results for input(s): PTP, INR, APTT, INREXT in the last 72 hours. Lipid Panel Lab Results   Component Value Date/Time    Cholesterol, total 102 10/07/2022 03:45 AM    HDL Cholesterol 42 10/07/2022 03:45 AM    LDL, calculated 47.6 10/07/2022 03:45 AM    VLDL, calculated 12.4 10/07/2022 03:45 AM    Triglyceride 62 10/07/2022 03:45 AM    CHOL/HDL Ratio 2.4 10/07/2022 03:45 AM      BNP No results for input(s): BNPP in the last 72 hours.    Liver Enzymes Recent Labs     11/25/22  0112   TP 5.4*   ALB 3.6   AP 45 Thyroid Studies No results found for: T4, T3U, TSH, TSHEXT     Procedures/imaging: see electronic medical records for all procedures/Xrays and details which were not copied into this note but were reviewed prior to creation of Plan

## 2022-11-25 NOTE — PROGRESS NOTES
Problem: Risk for Spread of Infection  Goal: Prevent transmission of infectious organism to others  Description: Prevent the transmission of infectious organisms to other patients, staff members, and visitors. Outcome: Progressing Towards Goal     Problem: Patient Education:  Go to Education Activity  Goal: Patient/Family Education  Outcome: Progressing Towards Goal     Problem: Falls - Risk of  Goal: *Absence of Falls  Description: Document Sofia Fothergill Fall Risk and appropriate interventions in the flowsheet. Outcome: Progressing Towards Goal  Note: Fall Risk Interventions:  Mobility Interventions: Bed/chair exit alarm, Communicate number of staff needed for ambulation/transfer, OT consult for ADLs, Patient to call before getting OOB, Strengthening exercises (ROM-active/passive), Utilize walker, cane, or other assistive device         Medication Interventions: Bed/chair exit alarm, Evaluate medications/consider consulting pharmacy, Patient to call before getting OOB, Teach patient to arise slowly    Elimination Interventions: Bed/chair exit alarm, Call light in reach, Elevated toilet seat, Patient to call for help with toileting needs, Stay With Me (per policy), Toilet paper/wipes in reach, Toileting schedule/hourly rounds              Problem: Patient Education: Go to Patient Education Activity  Goal: Patient/Family Education  Outcome: Progressing Towards Goal     Problem: Diabetes Self-Management  Goal: *Disease process and treatment process  Description: Define diabetes and identify own type of diabetes; list 3 options for treating diabetes. Outcome: Progressing Towards Goal  Goal: *Incorporating nutritional management into lifestyle  Description: Describe effect of type, amount and timing of food on blood glucose; list 3 methods for planning meals. Outcome: Progressing Towards Goal  Goal: *Incorporating physical activity into lifestyle  Description: State effect of exercise on blood glucose levels.   Outcome: Progressing Towards Goal  Goal: *Developing strategies to promote health/change behavior  Description: Define the ABC's of diabetes; identify appropriate screenings, schedule and personal plan for screenings. Outcome: Progressing Towards Goal  Goal: *Using medications safely  Description: State effect of diabetes medications on diabetes; name diabetes medication taking, action and side effects. Outcome: Progressing Towards Goal  Goal: *Monitoring blood glucose, interpreting and using results  Description: Identify recommended blood glucose targets  and personal targets. Outcome: Progressing Towards Goal  Goal: *Prevention, detection, treatment of acute complications  Description: List symptoms of hyper- and hypoglycemia; describe how to treat low blood sugar and actions for lowering  high blood glucose level. Outcome: Progressing Towards Goal  Goal: *Prevention, detection and treatment of chronic complications  Description: Define the natural course of diabetes and describe the relationship of blood glucose levels to long term complications of diabetes.   Outcome: Progressing Towards Goal  Goal: *Developing strategies to address psychosocial issues  Description: Describe feelings about living with diabetes; identify support needed and support network  Outcome: Progressing Towards Goal  Goal: *Insulin pump training  Outcome: Progressing Towards Goal  Goal: *Sick day guidelines  Outcome: Progressing Towards Goal  Goal: *Patient Specific Goal (EDIT GOAL, INSERT TEXT)  Outcome: Progressing Towards Goal     Problem: Patient Education: Go to Patient Education Activity  Goal: Patient/Family Education  Outcome: Progressing Towards Goal     Problem: Patient Education: Go to Patient Education Activity  Goal: Patient/Family Education  Outcome: Progressing Towards Goal     Problem: Patient Education: Go to Patient Education Activity  Goal: Patient/Family Education  Outcome: Progressing Towards Goal     Problem: Patient Education: Go to Patient Education Activity  Goal: Patient/Family Education  Outcome: Progressing Towards Goal     Problem: Pressure Injury - Risk of  Goal: *Prevention of pressure injury  Description: Document Xavier Scale and appropriate interventions in the flowsheet.   Outcome: Progressing Towards Goal  Note: Pressure Injury Interventions:  Sensory Interventions: Assess changes in LOC    Moisture Interventions: Absorbent underpads    Activity Interventions: Assess need for specialty bed, Chair cushion, Increase time out of bed, Pressure redistribution bed/mattress(bed type), PT/OT evaluation    Mobility Interventions: Float heels, Chair cushion, Assess need for specialty bed    Nutrition Interventions: Document food/fluid/supplement intake, Discuss nutritional consult with provider, Offer support with meals,snacks and hydration    Friction and Shear Interventions: Feet elevated on foot rest, Foam dressings/transparent film/skin sealants, Lift sheet, Lift team/patient mobility team, Minimize layers, Transferring/repositioning devices                Problem: Patient Education: Go to Patient Education Activity  Goal: Patient/Family Education  Outcome: Progressing Towards Goal

## 2022-11-25 NOTE — PROGRESS NOTES
Bedside shift change report given to Massiel Cuadra  (oncoming nurse) by Estefany Davalos RN   (offgoing nurse). Report included the following information SBAR, Kardex, Intake/Output, MAR, Recent Results, and Cardiac Rhythm sinus HR 75 .

## 2022-11-25 NOTE — CONSULTS
Individual Follow-Up Form    11/15/2018    Quit Date:     Clinical Status of Patient: Outpatient    Length of Service: 60 minutes    Continuing Medication: yes  Wellbutrin    Other Medications: patches and NRT gum     Target Symptoms: Withdrawal and medication side effects. The following were  rated moderate (3) to severe (4) on TCRS:  · Moderate (3): none  · Severe (4): none    Comments: Patient is currently smoking 4-5 cigarettes per day and taking Wellbutrin prescribed by her PCP and 14 mg patch and gum with no negative side effects at this time. We discussed setting a quit date as well as stress management and strategies to help her achieve her quit.     Diagnosis: F17.200    Next Visit: 2 weeks   Nutrition Note    Consulted for diet education (low oxalate diet)  RD provided education on low oxalate on 11/23; refer to full assessment on 11/23. Visited pt this morning. Had breakfast this morning. Noted education handouts was not in room- wife took it home. Pt has no further questions or concerns regarding education. Nutrition Education: (from full assessment on 11/23). Educated on Low Oxalate Diet  Learners: Patient and Significant Other  Readiness: Acceptance  Method: Handout  Response: Verbalizes Understanding  Contact name and number provided.     Electronically signed by Shin Jansen RD on 11/25/2022 at 11:20 AM

## 2022-11-26 LAB
ALBUMIN SERPL-MCNC: 3.6 G/DL (ref 3.4–5)
ALBUMIN/GLOB SERPL: 1.6 {RATIO} (ref 0.8–1.7)
ALP SERPL-CCNC: 45 U/L (ref 45–117)
ALT SERPL-CCNC: 19 U/L (ref 16–61)
ANION GAP SERPL CALC-SCNC: 10 MMOL/L (ref 3–18)
AST SERPL-CCNC: 31 U/L (ref 10–38)
BASOPHILS # BLD: 0 K/UL (ref 0–0.1)
BASOPHILS NFR BLD: 1 % (ref 0–2)
BILIRUB SERPL-MCNC: 0.9 MG/DL (ref 0.2–1)
BUN SERPL-MCNC: 81 MG/DL (ref 7–18)
BUN/CREAT SERPL: 16 (ref 12–20)
CALCIUM SERPL-MCNC: 8.9 MG/DL (ref 8.5–10.1)
CHLORIDE SERPL-SCNC: 110 MMOL/L (ref 100–111)
CO2 SERPL-SCNC: 19 MMOL/L (ref 21–32)
CREAT SERPL-MCNC: 5.14 MG/DL (ref 0.6–1.3)
DIFFERENTIAL METHOD BLD: ABNORMAL
EOSINOPHIL # BLD: 0.1 K/UL (ref 0–0.4)
EOSINOPHIL NFR BLD: 7 % (ref 0–5)
ERYTHROCYTE [DISTWIDTH] IN BLOOD BY AUTOMATED COUNT: 13.9 % (ref 11.6–14.5)
GLOBULIN SER CALC-MCNC: 2.3 G/DL (ref 2–4)
GLUCOSE BLD STRIP.AUTO-MCNC: 118 MG/DL (ref 70–110)
GLUCOSE BLD STRIP.AUTO-MCNC: 123 MG/DL (ref 70–110)
GLUCOSE BLD STRIP.AUTO-MCNC: 147 MG/DL (ref 70–110)
GLUCOSE BLD STRIP.AUTO-MCNC: 236 MG/DL (ref 70–110)
GLUCOSE SERPL-MCNC: 112 MG/DL (ref 74–99)
HCT VFR BLD AUTO: 21.5 % (ref 36–48)
HGB BLD-MCNC: 7.1 G/DL (ref 13–16)
IMM GRANULOCYTES # BLD AUTO: 0 K/UL (ref 0–0.04)
IMM GRANULOCYTES NFR BLD AUTO: 1 % (ref 0–0.5)
LYMPHOCYTES # BLD: 0.3 K/UL (ref 0.9–3.6)
LYMPHOCYTES NFR BLD: 20 % (ref 21–52)
MCH RBC QN AUTO: 35.5 PG (ref 24–34)
MCHC RBC AUTO-ENTMCNC: 33 G/DL (ref 31–37)
MCV RBC AUTO: 107.5 FL (ref 78–100)
MONOCYTES # BLD: 0.1 K/UL (ref 0.05–1.2)
MONOCYTES NFR BLD: 8 % (ref 3–10)
NEUTS SEG # BLD: 1.1 K/UL (ref 1.8–8)
NEUTS SEG NFR BLD: 65 % (ref 40–73)
NRBC # BLD: 0 K/UL (ref 0–0.01)
NRBC BLD-RTO: 0 PER 100 WBC
PLATELET # BLD AUTO: 47 K/UL (ref 135–420)
PMV BLD AUTO: 12.5 FL (ref 9.2–11.8)
POTASSIUM SERPL-SCNC: 3.1 MMOL/L (ref 3.5–5.5)
PROT SERPL-MCNC: 5.9 G/DL (ref 6.4–8.2)
RBC # BLD AUTO: 2 M/UL (ref 4.35–5.65)
SODIUM SERPL-SCNC: 139 MMOL/L (ref 136–145)
WBC # BLD AUTO: 1.7 K/UL (ref 4.6–13.2)

## 2022-11-26 PROCEDURE — 74011250637 HC RX REV CODE- 250/637: Performed by: HOSPITALIST

## 2022-11-26 PROCEDURE — 82962 GLUCOSE BLOOD TEST: CPT

## 2022-11-26 PROCEDURE — 85025 COMPLETE CBC W/AUTO DIFF WBC: CPT

## 2022-11-26 PROCEDURE — 97530 THERAPEUTIC ACTIVITIES: CPT

## 2022-11-26 PROCEDURE — 74011000250 HC RX REV CODE- 250: Performed by: ANESTHESIOLOGY

## 2022-11-26 PROCEDURE — C9399 UNCLASSIFIED DRUGS OR BIOLOG: HCPCS | Performed by: STUDENT IN AN ORGANIZED HEALTH CARE EDUCATION/TRAINING PROGRAM

## 2022-11-26 PROCEDURE — 74011250636 HC RX REV CODE- 250/636: Performed by: STUDENT IN AN ORGANIZED HEALTH CARE EDUCATION/TRAINING PROGRAM

## 2022-11-26 PROCEDURE — 74011250637 HC RX REV CODE- 250/637: Performed by: INTERNAL MEDICINE

## 2022-11-26 PROCEDURE — 80053 COMPREHEN METABOLIC PANEL: CPT

## 2022-11-26 PROCEDURE — 65270000046 HC RM TELEMETRY

## 2022-11-26 PROCEDURE — 36415 COLL VENOUS BLD VENIPUNCTURE: CPT

## 2022-11-26 RX ORDER — POTASSIUM CHLORIDE 20 MEQ/1
40 TABLET, EXTENDED RELEASE ORAL
Status: COMPLETED | OUTPATIENT
Start: 2022-11-26 | End: 2022-11-26

## 2022-11-26 RX ORDER — METOLAZONE 5 MG/1
10 TABLET ORAL DAILY
Status: DISCONTINUED | OUTPATIENT
Start: 2022-11-27 | End: 2022-11-27 | Stop reason: HOSPADM

## 2022-11-26 RX ORDER — TORSEMIDE 20 MG/1
100 TABLET ORAL 2 TIMES DAILY
Status: DISCONTINUED | OUTPATIENT
Start: 2022-11-26 | End: 2022-11-27 | Stop reason: HOSPADM

## 2022-11-26 RX ADMIN — ALBUMIN HUMAN 25 G: 0.25 SOLUTION INTRAVENOUS at 09:12

## 2022-11-26 RX ADMIN — TORSEMIDE 100 MG: 20 TABLET ORAL at 21:48

## 2022-11-26 RX ADMIN — Medication 2 TABLET: at 09:13

## 2022-11-26 RX ADMIN — SODIUM CHLORIDE, PRESERVATIVE FREE 10 ML: 5 INJECTION INTRAVENOUS at 14:17

## 2022-11-26 RX ADMIN — TORSEMIDE 100 MG: 20 TABLET ORAL at 14:14

## 2022-11-26 RX ADMIN — POTASSIUM CHLORIDE 40 MEQ: 1500 TABLET, EXTENDED RELEASE ORAL at 09:14

## 2022-11-26 RX ADMIN — ALBUMIN HUMAN 25 G: 0.25 SOLUTION INTRAVENOUS at 02:11

## 2022-11-26 RX ADMIN — DOXAZOSIN MESYLATE 4 MG: 4 TABLET ORAL at 21:48

## 2022-11-26 RX ADMIN — FERROUS SULFATE TAB 325 MG (65 MG ELEMENTAL FE) 325 MG: 325 (65 FE) TAB at 17:17

## 2022-11-26 RX ADMIN — SODIUM CHLORIDE, PRESERVATIVE FREE 10 ML: 5 INJECTION INTRAVENOUS at 06:36

## 2022-11-26 RX ADMIN — CETIRIZINE HYDROCHLORIDE 10 MG: 10 TABLET, FILM COATED ORAL at 21:49

## 2022-11-26 RX ADMIN — Medication 2 TABLET: at 21:48

## 2022-11-26 RX ADMIN — SODIUM CHLORIDE, PRESERVATIVE FREE 10 ML: 5 INJECTION INTRAVENOUS at 21:49

## 2022-11-26 RX ADMIN — FERROUS SULFATE TAB 325 MG (65 MG ELEMENTAL FE) 325 MG: 325 (65 FE) TAB at 09:13

## 2022-11-26 NOTE — PROGRESS NOTES
Problem: Mobility Impaired (Adult and Pediatric)  Goal: *Acute Goals and Plan of Care (Insert Text)  Description: Physical Therapy Goals   Initiated 11/14/2022 and to be accomplished within 7 day(s)  1. Patient will move from supine <> sit with SBA in prep for out of bed activity and change of position. 2.  Patient will perform sit<> stand with SBA with RW in prep for transfers/ambulation. 3.  Patient will ambulate 50 feet with S/RW for improved functional mobility at discharge. 4.  Patient will ascend/descend 1stairs with handrail(s) with minimal assistance/contact guard assist for home re-entry as needed. Reviewed and updated 11/21/2022 and to be accomplished within 7 day(s)  1. Patient will move from supine <> sit with supervision in prep for out of bed activity and change of position. (Upgraded)  2. Patient will perform sit<> stand with supervision with RW in prep for transfers/ambulation. (Upgraded)  3. Patient will ambulate 150 feet with Supervision, RW for improved functional mobility at discharge. (Upgraded)  4. Patient will ascend/descend 1stairs with handrail(s) with minimal assistance/contact guard assist for home re-entry as needed. (D/c goal, pt only has threshold entry)        Outcome: Progressing Towards Goal   []  Patient has met MD mobilization critieria for d/c home   []  Recommend HH with 24 hour adult care   [x]  Benefit from additional acute PT session to address:  ambulation    PHYSICAL THERAPY TREATMENT    Patient: Melissa Henderson  (78 y.o. male)  Date: 11/26/2022  Diagnosis: Hypoxia [R09.02]  NAPOLEON (acute kidney injury) ([address] [N17.9]  Aortic stenosis [I35.0]  Peripheral edema [R60.9] Acute renal failure superimposed on stage 3 chronic kidney disease (HCC)  Procedure(s) (LRB):  ESOPHAGOGASTRODUODENOSCOPY (EGD) (N/A) 3 Days Post-Op  Precautions: Fall  PLOF: independent, ambulatory without AD, BRW delivered    ASSESSMENT:  Pt in chair requesting to get back to bed, refusing ambulation prior to. Multiple attempts needed for sit to stand, rocking technique utilized and Evelyn. Pt stepped backwards with RW for 4 ft.  Evelyn with LEs into bed. Progression toward goals:   []      Improving appropriately and progressing toward goals  [x]      Improving slowly and progressing toward goals  []      Not making progress toward goals and plan of care will be adjusted     PLAN:  Patient continues to benefit from skilled intervention to address the above impairments. Continue treatment per established plan of care. Discharge Recommendations: Home Physical Therapy  Further Equipment Recommendations for Discharge:  N/A    AMPA: 15/20    This AMPAC score should be considered in conjunction with interdisciplinary team recommendations to determine the most appropriate discharge setting. Patient's social support, diagnosis, medical stability, and prior level of function should also be taken into consideration. SUBJECTIVE:   Patient stated I sat for too long, I can't get up.     OBJECTIVE DATA SUMMARY:   Critical Behavior:  Neurologic State: Alert  Orientation Level: Oriented X4  Cognition: Follows commands, Appropriate decision making, Appropriate for age attention/concentration, Appropriate safety awareness  Safety/Judgement: Fall prevention  Functional Mobility Training:  Bed Mobility:  Sit to Supine: Minimum assistance  Transfers:  Sit to Stand: Minimum assistance  Stand to Sit: Stand-by assistance  Balance:  Sitting: Intact  Standing: Intact; With support   Ambulation/Gait Training:  Distance (ft): 4 Feet (ft)  Assistive Device: Gait belt;Walker, rolling        Pain:  Pain level pre-treatment: 0/10  Pain level post-treatment: 0/10   Pain Intervention(s): Medication (see MAR); Rest, Ice, Repositioning   Response to intervention: Nurse notified, See doc flow    Activity Tolerance:   Fair  Please refer to the flowsheet for vital signs taken during this treatment.   After treatment:   [] Patient left in no apparent distress sitting up in chair  [x] Patient left in no apparent distress in bed  [x] Call bell left within reach  [] Nursing notified  [x] Caregiver present  [] Bed alarm activated  [] SCDs applied      COMMUNICATION/EDUCATION:   [x]         Role of Physical Therapy in the acute care setting. [x]         Fall prevention education was provided and the patient/caregiver indicated understanding. [x]         Patient/family have participated as able in working toward goals and plan of care. [x]         Patient/family agree to work toward stated goals and plan of care. []         Patient understands intent and goals of therapy, but is neutral about his/her participation. []         Patient is unable to participate in stated goals/plan of care: ongoing with therapy staff.  []         Other:        Katt Zapata PTA   Time Calculation: 11 mins    [address] \[facility]\"" [address] [address] AM-PAC® Basic Mobility Inpatient Short Form (6-Clicks) Version 2    How much HELP from another person does the patient currently need    (If the patient hasn't done an activity recently, how much help from another person do you think he/she would need if he/she tried?)   Total (Total A or Dep)   A Lot  (Mod to Max A)   A Little (Sup or Min A)   None (Mod I to I)   Turning from your back to your side while in a flat bed without using bedrails? [] 1 [] 2 [x] 3 [] 4   2. Moving from lying on your back to sitting on the side of a flat bed without using bedrails? [] 1 [] 2 [x] 3 [] 4   3. Moving to and from a bed to a chair (including a wheelchair)? [] 1 [] 2 [x] 3 [] 4   4. Standing up from a chair using your arms (e.g., wheelchair, or bedside chair)? [] 1 [] 2 [x] 3 [] 4   5. Walking in hospital room? [] 1 [] 2 [x] 3 [] 4   6. Climbing 3-5 steps with a railing?+   [] 1 [] 2 [] 3 [] 4   +If stair climbing cannot be assessed, skip item #6. Sum responses from items 1-5.      Based on an AM-PAC score of **/24 (or **/20 if omitting stairs) and their current functional mobility deficits, it is recommended that the patient have 5-7 sessions per week of Physical Therapy at d/c to increase the patient's independence. Currently, this patient demonstrates the potential endurance, and/or tolerance for 3 hours of therapy each day at d/c. Based on an AM-PAC score of **/24 (15/20 if omitting stairs) and their current functional mobility deficits, it is recommended that the patient have 3-5 sessions per week of Physical Therapy at d/c to increase the patient's independence. Based on an AM-PAC score of **/24 (or **/20 if omitting stairs) and their current functional mobility deficits, it is recommended that the patient have 2-3 sessions per week of Physical Therapy at d/c to increase the patient's independence. At this time and based on an AM-PAC score of **/24 (or **/20 if omitting stairs), no further PT is recommended upon discharge due to (i.e. patient at baseline functional statusetc). Recommend patient returns to prior setting with prior services.

## 2022-11-26 NOTE — PROGRESS NOTES
Bedside report given to Sharita Mckeon RN. While in room Pt stated his IV was not working, pulled his arm out and blood noted around arm and on gown. Midline noted to be half way out and site bleeding. Midline removed and pressure dressing placed on arm, arm elevated. X 2 attempt at new IV, unsuccessful. Oncoming RN made aware of no IV access at this time.

## 2022-11-26 NOTE — PROGRESS NOTES
D/C Plan: Home with [id number] and bariatric RW (already delivered to room) pending medically ready    CM notes per nephrology and hospitalist notes patient is expressing desire to go home. CM notes per nephrology note, if patient dicsharges toay he should follow up in the nephrology office on Monday/Tuesday for renal fxn check, if his renal fxn continues to improve conservative treatment will continue, if it declines he will need dialysis. Due to the time of day, CM anticipates possible discharge tomorrow. Care Management Interventions  PCP Verified by CM:  Yes  Mode of Transport at Discharge: Self  Transition of Care Consult (CM Consult): [address]: Yes  Physical Therapy Consult: Yes  Occupational Therapy Consult: Yes  Support Systems: Spouse/Significant Other  Confirm Follow Up Transport: Family  The Plan for Transition of Care is Related to the Following Treatment Goals : acute renal failure  The Patient and/or Patient Representative was Provided with a Choice of Provider and Agrees with the Discharge Plan?: Yes  Name of the Patient Representative Who was Provided with a Choice of Provider and Agrees with the Discharge Plan: Gifty Villaseñor, patient  [place] of Choice List was Provided with Basic Dialogue that Supports the Patient's Individualized Plan of Care/Goals, Treatment Preferences and Shares the Quality Data Associated with the Providers?: Yes  Discharge Location  Patient Expects to be Discharged to[de-identified] Home with home health

## 2022-11-26 NOTE — PROGRESS NOTES
Hospitalist Progress Note-critical care note     Patient: Ramez Jensen MRN: [id number]  CSN: [id number]    YOB: 1940  Age: 78 y.o. 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  ICD-9-CM: 189.0  11/23/2022 Unknown        Cirrhosis ([facility] ICD-10-CM: K74.60  ICD-9-CM: 571.5  11/23/2022 Unknown        Peripheral edema ICD-10-CM: R60.9  ICD-9-CM: 782.3  11/12/2022 Unknown        Hypoxia ICD-10-CM: R09.02  ICD-9-CM: 799.02  11/12/2022 Unknown        * (Principal) Acute renal failure superimposed on stage 3 chronic kidney disease ([facility] ICD-10-CM: N17.9, N18.30  ICD-9-CM: 584.9, 585.3  11/12/2022 Unknown        Aortic stenosis ICD-10-CM: I35.0  ICD-9-CM: 424.1  10/7/2022 Unknown          NAPOLEON on CKD3  Biopsy done,   Acute tubular necrosis , IgA nephropathy , 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 Ting Wallace. Per dr. Brenna Roblero  documented: they would like to transition to hospice    Cirrhosis with ascites   Dr. Jake Salas seeing pt and upper egd done EGD demonstrated portal gastropathy   Post paracentesis   Continue albumin infusion     Anasarca   Due to liver and renal disease   Continue albumin possible hd     Uti completed abx     Severe as   Cardiologist seeing pt     Pancytopenia transfuse as needed     Subjective my tummy likes this before , I want to go home       Prognosis is poor     Case discussed with Dr. Brenna Roblero , will consult hospice   Disposition :1-2 days   Review of systems:    General: No fevers or chills.   Cardiovascular: No chest pain or pressure. No palpitations. Pulmonary: No shortness of breath. Gastrointestinal: No nausea, vomiting. Vital signs/Intake and Output:  Visit Vitals  /63   Pulse 83   Temp 98.1 °F (36.7 °C)   Resp 18   Ht 6' (1.829 m)   Wt 122.5 kg (270 lb)   SpO2 99%   BMI 36.62 kg/m²     Current Shift:  No intake/output data recorded. Last three shifts:  11/24 1901 - 11/26 0700  In: 1492.5 [P.O.:840; I.V.:652.5]  Out: 650 [Urine:650]    Physical Exam:  General: WD, WN. Alert, cooperative, no acute distress    HEENT: NC, Atraumatic. PERRLA, anicteric sclerae. Lungs: CTA Bilaterally. No Wheezing/Rhonchi/Rales. Heart:  Regular  rhythm,  + murmur, No Rubs, No Gallops  Abdomen: Soft, +distended, Non tender. +Bowel sounds, edema   Extremities: No c/c, edema   Psych:   Not anxious or agitated. Neurologic:  No acute neurological deficit. Labs: Results:       Chemistry Recent Labs     11/26/22 0300 11/25/22 0112 11/24/22 0144   * 117* 140*    142 143   K 3.1* 3.4* 3.1*    109 108   CO2 19* 22 22   BUN 81* 90* 91*   CREA 5.14* 5.30* 5.60*   CA 8.9 8.5 8.6   AGAP 10 11 13   BUCR 16 17 16   AP 45 45  --    TP 5.9* 5.4*  --    ALB 3.6 3.6  --    GLOB 2.3 1.8*  --    AGRAT 1.6 2.0*  --         CBC w/Diff Recent Labs     11/26/22 0300 11/25/22 0112 11/24/22 0144   WBC 1.7* 1.5* 1.6*   RBC 2.00* 2.07* 2.05*   HGB 7.1* 7.3* 7.2*   HCT 21.5* 21.9* 21.7*   PLT 47* 49* 48*   GRANS 65 63 66   LYMPH 20* 22 19*   EOS 7* 5 6*        Cardiac Enzymes No results for input(s): CPK, CKND1, CESAR in the last 72 hours. No lab exists for component: CKRMB, TROIP   Coagulation No results for input(s): PTP, INR, APTT, INREXT, INREXT in the last 72 hours.     Lipid Panel Lab Results   Component Value Date/Time    Cholesterol, total 102 10/07/2022 03:45 AM    HDL Cholesterol 42 10/07/2022 03:45 AM    LDL, calculated 47.6 10/07/2022 03:45 AM    VLDL, calculated 12.4 10/07/2022 03:45 AM Triglyceride 62 10/07/2022 03:45 AM    CHOL/HDL Ratio 2.4 10/07/2022 03:45 AM      BNP No results for input(s): BNPP in the last 72 hours. Liver Enzymes Recent Labs     11/26/22  0300   TP 5.9*   ALB 3.6   AP 45        Thyroid Studies No results found for: T4, T3U, TSH, TSHEXT, TSHEXT     Procedures/imaging: see electronic medical records for all procedures/Xrays and details which were not copied into this note but were reviewed prior to creation of Plan    CT CHEST ABD PELV WO CONT    Result Date: 11/16/2022  EXAM: CT chest, abdomen, and pelvis INDICATION: \"Anasarca\"   > Patient currently admitted for renal failure, edema, and shortness of breath. COMPARISON: No relevant prior study. TECHNIQUE: Axial CT imaging of the chest, abdomen, and pelvis was performed without IV contrast administration. Multiplanar reformats were generated. One or more dose reduction techniques were used on this CT: automated exposure control, adjustment of the mAs and/or kVp according to patient size, and iterative reconstruction techniques. The specific techniques used on this CT exam have been documented in the patient's electronic medical record. Digital Imaging and Communications in Medicine (DICOM) format image data are available to nonaffiliated external healthcare facilities or entities on a secure, media free, reciprocally searchable basis with patient authorization for at least a 12-month period after this study. _______________ FINDINGS: CHEST: MEDIASTINUM: Thoracic aorta is normal in course and caliber. No evidence of pericardial effusion. Normal cardiac size. Multivessel coronary arterial atherosclerotic vascular calcifications. No pericardial effusion. LYMPH NODES: No pathologically enlarged mediastinal or hilar lymph nodes. PLEURA: Unremarkable without pleural effusion or pneumothorax. LUNGS/AIRWAY: Central airways are patent. Mild dependent changes of atelectasis.  Minor peribronchial groundglass densities are seen bilaterally. No consolidation. OTHER: None. ABDOMEN/PELVIS: LIVER, BILIARY: Lobular hepatic contour and widened periportal space noted. No abnormal biliary dilation. Multiple gallstones are seen within the gallbladder. PANCREAS: Normal unenhanced appearance. SPLEEN: Mildly enlarged estimated at approximately 16 cm in craniocaudal span. ADRENALS: Unremarkable. KIDNEYS: No evidence of hydronephrosis. Bilateral renal atrophy. Punctate nonobstructing upper pole right and mid/lower pole left renal calculi. LYMPH NODES: No pathologically enlarged lymph nodes. GASTROINTESTINAL TRACT: There is a small hiatal hernia. No morphology of bowel obstruction. No free intraperitoneal gas. Large burden of formed stool throughout the large intestine. Evidence of prior ileocolic colonic resection and reanastomosis. PELVIC ORGANS: Urinary bladder decompressed with Gillis catheter in situ. VASCULATURE: Diffuse aortobiiliac atherosclerosis is present. 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 OSSEOUS: Extended thoracic lumbar spinal fusion instrumentation is present spanning the T10-S1 levels. Multilevel spondylosis and levorotatory scoliosis of the mid lumbar spine is demonstrated. Surgical instrumentation appears intact. No clearly acute or aggressive appearing osseous abnormality. OTHER: Diffuse body wall edema with small moderate amount of abdominal/pelvic ascites. _______________     1. Within the chest:   > Mild dependent changes of atelectasis and peribronchial areas of groundglass density which may reflect a manifestation of small airways disease or potentially alveolar edema   > No evidence of pleural effusion. 2. Within the abdomen/pelvis:   > Cirrhotic hepatic morphology and signal of portal hypertension with small moderate amount of ascites.   > No abnormal bowel wall thickening or inflammatory changes.  Prior ileocolonic resection and reanastomosis. > Cholelithiasis without evidence of cholecystitis.   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   > Extensive body wall edema.   > No evidence of obstructive uropathy. Urinary bladder decompressed with Gillis catheter in situ. US GUIDE PARACENTESIS    Result Date: 11/17/2022  PROCEDURE:  ULTRASOUND GUIDED PARACENTESIS INDICATION: Ascites. ________________________ TECHNIQUE/FINDINGS: I performed the procedure. The ascites in the right paracolic gutter was localized under ultrasound guidance. An image of the ascites was obtained and submitted into the patient record. The skin was marked, prepped and draped in sterile fashion and lidocaine was used for local anesthesia. Sterile probe cover and gel was used. A 5 [place] sheathed needle was advanced into the fluid. A total of 3 200 mL of straw colored fluid was drained. Sample of fluid was sent for laboratory evaluation. The patient tolerated the procedure well and there were no immediate complications. Pre-procedure time out:  0900 hours. GUIDANCE: Ultrasound guidance was used to position (and confirm the position of) the sheathed needle. Image(s) saved in PACS: Ultrasound ________________________     Successful ultrasound guided paracentesis of approximately 3200 mL of straw colored fluid. US RETROPERITONEUM COMP    Result Date: 11/13/2022  Ultrasound retroperitoneal INDICATIONS: Renal failure COMPARISON: None FINDINGS: The right kidney is normal in size, measuring 10.4 cm in length. Increased right renal cortical echogenicity. No solid or cystic right renal mass, hydronephrosis, or calculi. The left kidney is normal in size, measuring 10.5 cm in length. Increased left renal cortical echogenicity. No solid or cystic left renal mass, hydronephrosis, or calculi. Gillis catheter is seen within the nondistended bladder.  Partially visualized nodular increased echogenicity throughout visualized liver. Partially visualized ascites. Increased left and right renal cortical echogenicity, suggestive of chronic medical renal disease. No obstructive uropathy or acute sonographic abnormality. XR CHEST PORT    Result Date: 11/12/2022  EXAM: One view chest x-ray CLINICAL INDICATION/HISTORY: Dyspnea and lower extremity swelling. COMPARISON: 09/06/2022. TECHNIQUE: Single AP view of the chest was obtained. _______________ FINDINGS: HEART, VESSELS, MEDIASTINUM: Heart size is stable. No vascular congestion. There is atherosclerosis of the thoracic aorta. LUNGS, PLEURAL SPACES: The lungs are clear. No effusion or pneumothorax. BONY THORAX, SOFT TISSUES: Unremarkable. _______________     No acute cardiopulmonary process. CT BX RENAL    Result Date: 11/18/2022  PROCEDURE: CT-guided left native kidney medical renal core biopsy INDICATION: ARF on CKD. Nephrology request native kidney medical renal core biopsy. Note of thrombocytopenia, platelet count is sufficient per guidelines, noting patient received DDAVP prior to the procedure. COMPARISON: CT 11/16/2022 TECHNIQUE AND FINDINGS: Written and witnessed informed consent was obtained prior to the procedure. GUIDANCE: CT guidance was used to position (and confirm the position of) the needles. Image(s) saved in PACS: CT. Patient was prone. Typical sterile preparation and draping. Skin entry site was chosen with noncontrast CT. Local anesthetic with 1% lidocaine. A 16-gauge Temno coaxial biopsy set was used. From a left paraspinous approach, the guide needle was advanced into the peripheral cortex of the lower pole of the left native kidney. Limited noncontrast CT was performed confirming the needle tip in good position. 2 visually good quality 16-gauge cores of tissue were obtained. The specimen was reviewed by the present pathologist demonstrating  adequate glomeruli.  An additional, extra core was considered but deferred given persistent bleeding from the guide needle and borderline laboratory parameters and grossly sufficient glomeruli present within the first 2 cores. 5 mm Gelfoam torpedoes were deployed x2 through the guide needle as the guide needle was removed. Hemostasis was supplemented with firm manual compression. The patient tolerated the procedure well, without complications. MODERATE SEDATION: Provided interventional radiology nursing, trained, qualified independent observer, DEENA Piper with versed 1 mg IV and fentanyl 25 ugm IV with continuous monitoring of vital signs. Start time 081 850 53 42 / End time 1100. Sedation time 17 minutes. Standard post procedure pause. _____________     Successful CT-guided left native kidney medical renal core biopsy. ECHO ADULT FOLLOW-UP OR LIMITED    Result Date: 11/16/2022    Left Ventricle: Normal left ventricular systolic function with a visually estimated EF of 65 - 70%. Right Ventricle: Right ventricle size is normal. Normal systolic function. Aortic Valve: Gordon Chime Moderate stenosis of the aortic valve. AV mean gradient is 30 mmHg. AV peak gradient is 61 mmHg. AV peak velocity is 3.6-3.8 m/s. Tricuspid valve : Trace regurgitation. The estimated PASP is 37 mmHg. IVC/SVC: IVC diameter is less than or equal to 21 mm and decreases greater than 50% during inspiration; therefore the estimated right atrial pressure is normal (~3 mmHg). IVC size is normal.     ECHO ADULT FOLLOW-UP OR LIMITED    Result Date: 11/15/2022    Left Ventricle: Normal left ventricular systolic function with a visually estimated EF of 60 - 65%. Left ventricle size is normal. Increased wall thickness. Mild septal thickening. Normal wall motion. Grade I diastolic dysfunction present with normal LV EF. Aortic Valve: Mild regurgitation. Mild stenosis of the aortic valve. Mitral Valve: Moderate annular calcification at the anterior and posterior leaflets of the mitral valve.  Mild stenosis noted.   Tricuspid Valve: Unable to assess RVSP due to inadequate or insignificant tricuspid regurgitation. DUPLEX LOWER EXT VENOUS BILAT    Result Date: 11/16/2022  · No evidence of deep vein thrombosis in the right lower extremity veins assessed. · No evidence of deep vein thrombosis in the left lower extremity veins assessed. DUPLEX RENAL ART/RACHID BILATERAL    Result Date: 11/18/2022  Inadequate study secondary to body habitus and increase bowel gas. Unable to document any color flow on left kidney and renal artery bilaterally.       Ronnie Coyle MD

## 2022-11-26 NOTE — PROGRESS NOTES
Assessment:   Anne Griffith is a 78y.o. year old male with ongoing CKD baseline cr of 2.3 range. Has been admitted with anasarca, fluid weight gain  over 2 weeks  He developed acute renal failure with progressive rise in creatinine of unknown etiology   CT scan of abdomen showed : cirrhotic morphology with moderate ascites , most likely from portal hypertension , no evidence of SBP   Does not have significant hematuria and proteinuria to suspect GN      USG is negative for obstructive uropathy   Renal artery doppler is inconclusive due to body habitus/bowel gas. 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 With no hope of renal recovery, pt will suffer on dialysis. His life expectancy with or without dialysis is less than 6 months. Long d/w pt and wife over the phone, they would like to transition to hospice. D/w care management  Needs to be on high dose diuretics for comfort measures and volume management. CC: ARF, anasarca  Interval History: no interval change since yesterday  Minimal urine output    Subjective:   PT does not have any somatic complaints              Blood pressure (!) 104/52, pulse 77, temperature 98.1 °F (36.7 °C), resp. rate 18, height 6' (1.829 m), weight 122.5 kg (270 lb), SpO2 98 %.       NAD, Conversant  Carlin Bermudez in place        Intake/Output Summary (Last 24 hours) at 11/26/2022 1132  Last data filed at 11/26/2022 0405  Gross per 24 hour   Intake 700 ml   Output 650 ml   Net 50 ml      Recent Labs     11/26/22  0300   WBC 1.7*     Lab Results   Component Value Date/Time    Sodium 139 11/26/2022 03:00 AM    Potassium 3.1 (L) 11/26/2022 03:00 AM    Chloride 110 11/26/2022 03:00 AM    CO2 19 (L) 11/26/2022 03:00 AM    Anion gap 10 11/26/2022 03:00 AM    Glucose 112 (H) 11/26/2022 03:00 AM    BUN 81 (H) 11/26/2022 03:00 AM    Creatinine 5.14 (H) 11/26/2022 03:00 AM    BUN/Creatinine ratio 16 11/26/2022 03:00 AM    GFR est AA 36 (L) 09/15/2022 04:18 PM    GFR est non-AA 29 (L) 09/15/2022 04:18 PM    Calcium 8.9 11/26/2022 03:00 AM        Current Facility-Administered Medications   Medication Dose Route Frequency Provider Last Rate Last Admin    torsemide (DEMADEX) tablet 100 mg  100 mg Oral BID Adán Pillai DO        [START ON 11/27/2022] metOLazone (ZAROXOLYN) tablet 10 mg  10 mg Oral DAILY Adán Pillai DO        simethicone (MYLICON) 98QD/7.7XW oral drops 80 mg  1.2 mL Oral Multiple Nia Davis MD        sodium chloride (NS) flush 5-40 mL  5-40 mL IntraVENous Q8H Pranay Bill MD   10 mL at 11/26/22 0636    naloxone (NARCAN) injection 0.1 mg  0.1 mg IntraVENous PRN Pranay Bill MD        flumazeniL (ROMAZICON) 0.1 mg/mL injection 0.2 mg  0.2 mg IntraVENous Multiple Pranay Bill MD        sodium chloride (NS) flush 5-40 mL  5-40 mL IntraVENous PRN Alexi Gilliland MD   10 mL at 11/25/22 1522    doxazosin (CARDURA) tablet 4 mg  4 mg Oral QHS Nabil Luo MD   4 mg at 11/25/22 2200    ferrous sulfate tablet 325 mg  1 Tablet Oral BID WITH MEALS Nabil Luo MD   325 mg at 11/26/22 0913    cetirizine (ZYRTEC) tablet 10 mg  10 mg Oral QHS Nabil Luo MD   10 mg at 11/25/22 2213    insulin lispro (HUMALOG) injection   SubCUTAneous AC&HS Nabil Luo MD   2 Units at 11/25/22 1612    glucose chewable tablet 16 g  4 Tablet Oral PRN Nabil Luo MD        glucagon (GLUCAGEN) injection 1 mg  1 mg IntraMUSCular PRN Cande Luo MD        dextrose 10% infusion 0-250 mL  0-250 mL IntraVENous PRN Cande Luo MD        [facility]) tablet 50 mg (Patient Supplied)  50 mg Oral ACB Cande Luo MD   50 mg at 11/26/22 0730    Lactobacillus Acidoph & Shu [facility]) tablet 2 Tablet  2 Tablet Oral BID Cande Luo MD   2 Tablet at 11/26/22 0913    Total time spent 61 minutes

## 2022-11-27 ENCOUNTER — HOSPICE ADMISSION (OUTPATIENT)
Dept: HOSPICE | Facility: HOSPICE | Age: 82
End: 2022-11-27

## 2022-11-27 VITALS
HEART RATE: 80 BPM | DIASTOLIC BLOOD PRESSURE: 59 MMHG | OXYGEN SATURATION: 96 % | HEIGHT: 72 IN | SYSTOLIC BLOOD PRESSURE: 110 MMHG | BODY MASS INDEX: 37.88 KG/M2 | TEMPERATURE: 98 F | WEIGHT: 279.7 LBS | RESPIRATION RATE: 20 BRPM

## 2022-11-27 LAB
GLUCOSE BLD STRIP.AUTO-MCNC: 113 MG/DL (ref 70–110)
GLUCOSE BLD STRIP.AUTO-MCNC: 134 MG/DL (ref 70–110)

## 2022-11-27 PROCEDURE — 74011000250 HC RX REV CODE- 250: Performed by: ANESTHESIOLOGY

## 2022-11-27 PROCEDURE — 82962 GLUCOSE BLOOD TEST: CPT

## 2022-11-27 PROCEDURE — 74011250637 HC RX REV CODE- 250/637: Performed by: INTERNAL MEDICINE

## 2022-11-27 RX ORDER — UREA 10 %
2 LOTION (ML) TOPICAL 2 TIMES DAILY
Qty: 60 TABLET | Refills: 0 | Status: SHIPPED | OUTPATIENT
Start: 2022-11-27

## 2022-11-27 RX ORDER — METOLAZONE 10 MG/1
10 TABLET ORAL DAILY
Qty: 30 TABLET | Refills: 0 | Status: SHIPPED | OUTPATIENT
Start: 2022-11-28

## 2022-11-27 RX ORDER — TORSEMIDE 100 MG/1
100 TABLET ORAL 2 TIMES DAILY
Qty: 60 TABLET | Refills: 0 | Status: SHIPPED | OUTPATIENT
Start: 2022-11-27

## 2022-11-27 RX ADMIN — METOLAZONE 10 MG: 5 TABLET ORAL at 09:16

## 2022-11-27 RX ADMIN — SODIUM CHLORIDE, PRESERVATIVE FREE 10 ML: 5 INJECTION INTRAVENOUS at 06:00

## 2022-11-27 RX ADMIN — FERROUS SULFATE TAB 325 MG (65 MG ELEMENTAL FE) 325 MG: 325 (65 FE) TAB at 09:17

## 2022-11-27 RX ADMIN — TORSEMIDE 100 MG: 20 TABLET ORAL at 09:16

## 2022-11-27 RX ADMIN — Medication 2 TABLET: at 09:16

## 2022-11-27 RX ADMIN — SODIUM CHLORIDE, PRESERVATIVE FREE 10 ML: 5 INJECTION INTRAVENOUS at 14:00

## 2022-11-27 NOTE — HOSPICE
Called pt's room and wife answered the phone. Introduced myself and reason for call. Attempted to explain reason for call and asked if could call cell phone to be placed on speaker. She advised he had a friend coming so it would have to wait and she was under the impression he would be coming home tomorrow. Called Case Management and she advised she would go speak with them and call me back. Pt tentatively discharging today. Hospice referral received. Chart review in process. Thank you for the referral to [address] [place] [address].  If we can be of further assistance please contact [phone], RN  Clinical Manager  [place] [address]., [address] [facility], [facility] [address]  [phone]  Email: [email or web address]

## 2022-11-27 NOTE — HOSPICE
Paul Lomeli with [facility] and provided hospice billing information. They advised medication will be ready for pickup within 1 hour. Spoke with  and Mrs. Krystian To via phone  Discussed [facility] philosophy, services, criteria, and IDT. Discussed caregiver need for round the clock care with MrCamilo And Mrs. Krystian To  primary caregiver identified as Mrs. Perez  Caregiver concerns identified as n/a     Answered all questions. No DME needed at this time and wife will transport home. Meds sent to [facility], pt's preferred pharmacy. Mrs. Krystian To will transport home and Hospice nurse will admit this afternoon. Provided with 24/7 contact information and this writer's direct contact number. Thank you for the referral to [address] [place] [address]. If we can be of further assistance please contact [phone].     Alivia Ennis RN  Clinical Manager  [place] [address]., [address] [facility], [address] [place] [address] Str[address]  [phone]  Email: [email or web address]

## 2022-11-27 NOTE — PROGRESS NOTES
Assessment:     Rena Burgos is a 78y.o. year old male with ongoing CKD baseline cr of 2.3 range. Has been admitted with anasarca, fluid weight gain  over 2 weeks  He developed acute renal failure with progressive rise in creatinine of unknown etiology   CT scan of abdomen showed : cirrhotic morphology with moderate ascites , most likely from portal hypertension , no evidence of SBP   Does not have significant hematuria and proteinuria to suspect GN      USG is negative for obstructive uropathy   Renal artery doppler is inconclusive due to body habitus/bowel gas. 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 With no hope of renal recovery, pt will suffer on dialysis. His life expectancy with or without dialysis is less than 6 months. transition to hospice. Needs to be on high dose diuretics for comfort measures and volume management. CC: ARF, anasarca  Interval History: no interval change since yesterday  Minimal urine output     Subjective:   PT does not have any somatic complaints            Blood pressure (!) 110/59, pulse 80, temperature 98 °F (36.7 °C), resp. rate 20, height 6' (1.829 m), weight 126.9 kg (279 lb 11.2 oz), SpO2 96 %.       awake and alert   NAD      Intake/Output Summary (Last 24 hours) at 11/27/2022 1120  Last data filed at 11/27/2022 0300  Gross per 24 hour   Intake --   Output 1200 ml   Net -1200 ml      Recent Labs     11/26/22  0300   WBC 1.7*     Lab Results   Component Value Date/Time    Sodium 139 11/26/2022 03:00 AM    Potassium 3.1 (L) 11/26/2022 03:00 AM    Chloride 110 11/26/2022 03:00 AM    CO2 19 (L) 11/26/2022 03:00 AM    Anion gap 10 11/26/2022 03:00 AM    Glucose 112 (H) 11/26/2022 03:00 AM    BUN 81 (H) 11/26/2022 03:00 AM    Creatinine 5.14 (H) 11/26/2022 03:00 AM    BUN/Creatinine ratio 16 11/26/2022 03:00 AM    GFR est AA 36 (L) 09/15/2022 04:18 PM    GFR est non-AA 29 (L) 09/15/2022 04:18 PM    Calcium 8.9 11/26/2022 03:00 AM        Current Facility-Administered Medications   Medication Dose Route Frequency Provider Last Rate Last Admin    torsemide (DEMADEX) tablet 100 mg  100 mg Oral BID Adán Pillai R, DO   100 mg at 11/27/22 0916    metOLazone (ZAROXOLYN) tablet 10 mg  10 mg Oral DAILY Adán Pillai R, DO   10 mg at 11/27/22 0916    simethicone (MYLICON) 40OQ/5.2YK oral drops 80 mg  1.2 mL Oral Multiple Aleksandr Davis MD        sodium chloride (NS) flush 5-40 mL  5-40 mL IntraVENous Q8H Carmelina Gastelum MD   10 mL at 11/27/22 0600    naloxone (NARCAN) injection 0.1 mg  0.1 mg IntraVENous PRN Carmelina Gastelum MD        flumazeniL (ROMAZICON) 0.1 mg/mL injection 0.2 mg  0.2 mg IntraVENous Multiple Carmelina Gastelum MD        sodium chloride (NS) flush 5-40 mL  5-40 mL IntraVENous PRN Nika Pathak MD   10 mL at 11/25/22 1522    doxazosin (CARDURA) tablet 4 mg  4 mg Oral QHS Vivian Luo MD   4 mg at 11/26/22 2148    ferrous sulfate tablet 325 mg  1 Tablet Oral BID WITH MEALS Vivian Luo MD   325 mg at 11/27/22 0917    cetirizine (ZYRTEC) tablet 10 mg  10 mg Oral QHS Vivian Luo MD   10 mg at 11/26/22 2149    insulin lispro (HUMALOG) injection   SubCUTAneous AC&HS Vivian Luo MD   2 Units at 11/25/22 1612    glucose chewable tablet 16 g  4 Tablet Oral PRN Vivian Luo MD        glucagon (GLUCAGEN) injection 1 mg  1 mg IntraMUSCular PRN Vivian Luo MD        dextrose 10% infusion 0-250 mL  0-250 mL IntraVENous PRN Aimee Luo MD        [facility]) tablet 50 mg (Patient Supplied)  50 mg Oral ACB Aimee Luo MD   50 mg at 11/27/22 9791    [facility]) tablet 2 Tablet  2 Tablet Oral BID Aimee Luo MD   2 Tablet at 11/27/22 6699

## 2022-11-27 NOTE — PROGRESS NOTES
DC Plan: Change from [address] to home with [facility]    On previous day, care manager had been informed by nephrologist that patient was now wanting to go home under Hospice care instead of with [facility]; care manager met with patient sitting up in chair and he is aware and in agreement of recommendation; [facility] reviewed and patient selected [facility]. Order verified and CM contacted [facility] directly to verify start of care for today and to inform them that patient will be discharging today. Care Management Interventions  PCP Verified by CM:  Yes  Mode of Transport at Discharge: Self  Transition of Care Consult (CM Consult): [address]: Yes  [facility]: Yes  Physical Therapy Consult: Yes  Occupational Therapy Consult: Yes  Support Systems: Spouse/Significant Other  Confirm Follow Up Transport: Family  The Plan for Transition of Care is Related to the Following Treatment Goals : ARF  The Patient and/or Patient Representative was Provided with a Choice of Provider and Agrees with the Discharge Plan?: Yes  Name of the Patient Representative Who was Provided with a Choice of Provider and Agrees with the Discharge Plan: Isela Bernal, patient  Freedom of Choice List was Provided with Basic Dialogue that Supports the Patient's Individualized Plan of Care/Goals, Treatment Preferences and Shares the Quality Data Associated with the Providers?: Yes  Discharge Location  Patient Expects to be Discharged to[de-identified]  (Home with Hospice)

## 2022-11-27 NOTE — PROGRESS NOTES
Discussed with pt and cm. Pt wants to go home today, he does not want hospital bed. Discussed with CM , hospice will start the case today per cm reported.

## 2022-11-27 NOTE — DISCHARGE SUMMARY
Discharge Summary    Patient: Tata Ramos. MRN: [id number]  CSN: [id number]    YOB: 1940  Age: 78 y.o. 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  ICD-9-CM: 189.0  11/23/2022 Unknown        Cirrhosis ([facility] ICD-10-CM: K74.60  ICD-9-CM: 571.5  11/23/2022 Unknown        Peripheral edema ICD-10-CM: R60.9  ICD-9-CM: 782.3  11/12/2022 Unknown        Hypoxia ICD-10-CM: R09.02  ICD-9-CM: 799.02  11/12/2022 Unknown        * (Principal) Acute renal failure superimposed on stage 3 chronic kidney disease ([facility] ICD-10-CM: N17.9, N18.30  ICD-9-CM: 584.9, 585.3  11/12/2022 Unknown        Aortic stenosis ICD-10-CM: I35.0  ICD-9-CM: 424.1  10/7/2022 Unknown           Discharge Condition: stable     Discharge Medications:     Current Discharge Medication List        START taking these medications    Details   Lactobacillus Acidoph & Bulgar (FLORANEX) 1 million cell tab tablet Take 2 Tablets by mouth two (2) times a day. Qty: 60 Tablet, Refills: 0  Start date: 11/27/2022      metOLazone (ZAROXOLYN) 10 mg tablet Take 1 Tablet by mouth daily. Qty: 30 Tablet, Refills: 0  Start date: 11/28/2022      torsemide (DEMADEX) 100 mg tablet Take 1 Tablet by mouth two (2) times a day. Qty: 60 Tablet, Refills: 0  Start date: 11/27/2022           CONTINUE these medications which have NOT CHANGED    Details   doxazosin (CARDURA) 4 mg tablet Take 1 Tablet by mouth nightly. Indications: enlarged prostate with urination problem, high blood pressure  Qty: 20 Tablet, Refills: 0      desloratadine (CLARINEX) 5 mg tablet Take 5 mg by mouth nightly.  Indications: ALLERGIC RHINITIS      ferrous sulfate 325 mg (65 mg iron) tablet Take  by mouth Daily (before breakfast). Procedures : renal biopsy and egd     Consults: Cardiology and Nephrology and oncologist , palliative care       PHYSICAL EXAM   Visit Vitals  BP (!) 110/59 (BP 1 Location: Left upper arm, BP Patient Position: Semi fowlers)   Pulse 80   Temp 98 °F (36.7 °C)   Resp 20   Ht 6' (1.829 m)   Wt 126.9 kg (279 lb 11.2 oz)   SpO2 96%   BMI 37.93 kg/m²     General: Awake, cooperative, no acute distress    HEENT: NC, Atraumatic. PERRLA, EOMI. Anicteric sclerae. Lungs:  CTA Bilaterally. No Wheezing/Rhonchi/Rales. Heart:  Regular  rhythm,  No murmur, No Rubs, No Gallops  Abdomen: Soft, +distended, Non tender. +Bowel sounds,   Extremities: No c/c/e  Psych:   Not anxious or agitated. Neurologic:  No acute neurological deficits. Admission HPI :  Radha Giang is a 78 y.o.  male who has history of Crohn's disease, chronic kidney disease, aortic stenosis severe requiring valvular surgery presents to the emergency room with complaints of increasing lower extremity edema and shortness of breath despite adjusting his diuretic regimen at home.   In the emergency room he was found to be in acute kidney failure with increasing creatinine to 5 from a baseline of 2.5 he also had some mild hypokalemia with troponin elevation nephrology was consulted and recommended Lasix IV 3 times daily as well as Zaroxolyn 10 mg daily patient was recently discharged from the hospital 1 month ago with metabolic encephalopathy from UTI  Unfortunately he has not been able to have his aortic valve surgery due to low platelets he was recently started on Promacta 50 mg daily and taken off of his prednisone 80 mg daily  He has had some worsening diarrhea and has already had 3 stools since being in our hospital  He is followed by Dr. [facility] for thrombocytopenia  Hospital Course :       Pt was admitted for anasarca, pascale on ckd 3 and cirrhosis with ascites. He received albumin, and diuretics. Dr. Karon Garcia, cardiologist and oncologist and nephrologist consulted. Paracentesis done and renal biopsy was performed. 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 His renal function not improving per treatment. Dr. John Epperson noted   Fronie Lazaro all the comorbid conditions, lack of improvement with diuretics, worsening renal failure, high risk for procedures and lines. With no hope of renal recovery, pt will suffer on dialysis. His life expectancy with or without dialysis is less than 6 months. transition to hospice. \"    Hospice consulted   Needs to be on high dose diuretics for comfort measures and volume management. \"     He also received egd indicated portal gastropathy     Discharge planning discussed with patient, pt agrees  with the plan and no questions and concerns at this point. Activity: Activity as tolerated    Diet: Comfort feeding    Follow-up: hospice physician     Disposition: home     Minutes spent on discharge: 45 min       Labs: Results:       Chemistry Recent Labs     11/26/22 0300 11/25/22  0112   * 117*    142   K 3.1* 3.4*    109   CO2 19* 22   BUN 81* 90*   CREA 5.14* 5.30*   CA 8.9 8.5   AGAP 10 11   BUCR 16 17   AP 45 45   TP 5.9* 5.4*   ALB 3.6 3.6   GLOB 2.3 1.8*   AGRAT 1.6 2.0*      CBC w/Diff Recent Labs     11/26/22 0300 11/25/22  0112   WBC 1.7* 1.5*   RBC 2.00* 2.07*   HGB 7.1* 7.3*   HCT 21.5* 21.9*   PLT 47* 49*   GRANS 65 63   LYMPH 20* 22   EOS 7* 5      Cardiac Enzymes No results for input(s): CPK, CKND1, CESAR in the last 72 hours.     No lab exists for component: CKRMB, TROIP   Coagulation No results for input(s): PTP, INR, APTT, INREXT in the last 72 hours. Lipid Panel Lab Results   Component Value Date/Time    Cholesterol, total 102 10/07/2022 03:45 AM    HDL Cholesterol 42 10/07/2022 03:45 AM    LDL, calculated 47.6 10/07/2022 03:45 AM    VLDL, calculated 12.4 10/07/2022 03:45 AM    Triglyceride 62 10/07/2022 03:45 AM    CHOL/HDL Ratio 2.4 10/07/2022 03:45 AM      BNP No results for input(s): BNPP in the last 72 hours. Liver Enzymes Recent Labs     11/26/22  0300   TP 5.9*   ALB 3.6   AP 45      Thyroid Studies No results found for: T4, T3U, TSH, TSHEXT           Significant Diagnostic Studies: CT CHEST ABD PELV WO CONT    Result Date: 11/16/2022  EXAM: CT chest, abdomen, and pelvis INDICATION: \"Anasarca\"   > Patient currently admitted for renal failure, edema, and shortness of breath. COMPARISON: No relevant prior study. TECHNIQUE: Axial CT imaging of the chest, abdomen, and pelvis was performed without IV contrast administration. Multiplanar reformats were generated. One or more dose reduction techniques were used on this CT: automated exposure control, adjustment of the mAs and/or kVp according to patient size, and iterative reconstruction techniques. The specific techniques used on this CT exam have been documented in the patient's electronic medical record. Digital Imaging and Communications in Medicine (DICOM) format image data are available to nonaffiliated external healthcare facilities or entities on a secure, media free, reciprocally searchable basis with patient authorization for at least a 12-month period after this study. _______________ FINDINGS: CHEST: MEDIASTINUM: Thoracic aorta is normal in course and caliber. No evidence of pericardial effusion. Normal cardiac size. Multivessel coronary arterial atherosclerotic vascular calcifications. No pericardial effusion. LYMPH NODES: No pathologically enlarged mediastinal or hilar lymph nodes. PLEURA: Unremarkable without pleural effusion or pneumothorax.  LUNGS/AIRWAY: Central airways are patent. Mild dependent changes of atelectasis. Minor peribronchial groundglass densities are seen bilaterally. No consolidation. OTHER: None. ABDOMEN/PELVIS: LIVER, BILIARY: Lobular hepatic contour and widened periportal space noted. No abnormal biliary dilation. Multiple gallstones are seen within the gallbladder. PANCREAS: Normal unenhanced appearance. SPLEEN: Mildly enlarged estimated at approximately 16 cm in craniocaudal span. ADRENALS: Unremarkable. KIDNEYS: No evidence of hydronephrosis. Bilateral renal atrophy. Punctate nonobstructing upper pole right and mid/lower pole left renal calculi. LYMPH NODES: No pathologically enlarged lymph nodes. GASTROINTESTINAL TRACT: There is a small hiatal hernia. No morphology of bowel obstruction. No free intraperitoneal gas. Large burden of formed stool throughout the large intestine. Evidence of prior ileocolic colonic resection and reanastomosis. PELVIC ORGANS: Urinary bladder decompressed with Gillis catheter in situ. VASCULATURE: Diffuse aortobiiliac atherosclerosis is present. 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 OSSEOUS: Extended thoracic lumbar spinal fusion instrumentation is present spanning the T10-S1 levels. Multilevel spondylosis and levorotatory scoliosis of the mid lumbar spine is demonstrated. Surgical instrumentation appears intact. No clearly acute or aggressive appearing osseous abnormality. OTHER: Diffuse body wall edema with small moderate amount of abdominal/pelvic ascites. _______________     1. Within the chest:   > Mild dependent changes of atelectasis and peribronchial areas of groundglass density which may reflect a manifestation of small airways disease or potentially alveolar edema   > No evidence of pleural effusion.  2. Within the abdomen/pelvis:   > Cirrhotic hepatic morphology and signal of portal hypertension with small moderate amount of ascites.   > No abnormal bowel wall thickening or inflammatory changes. Prior ileocolonic resection and reanastomosis. > Cholelithiasis without evidence of cholecystitis.   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   > Extensive body wall edema.   > No evidence of obstructive uropathy. Urinary bladder decompressed with Gillis catheter in situ. US GUIDE PARACENTESIS    Result Date: 11/17/2022  PROCEDURE:  ULTRASOUND GUIDED PARACENTESIS INDICATION: Ascites. ________________________ TECHNIQUE/FINDINGS: I performed the procedure. The ascites in the right paracolic gutter was localized under ultrasound guidance. An image of the ascites was obtained and submitted into the patient record. The skin was marked, prepped and draped in sterile fashion and lidocaine was used for local anesthesia. Sterile probe cover and gel was used. A 5 [place] sheathed needle was advanced into the fluid. A total of 3 200 mL of straw colored fluid was drained. Sample of fluid was sent for laboratory evaluation. The patient tolerated the procedure well and there were no immediate complications. Pre-procedure time out:  0900 hours. GUIDANCE: Ultrasound guidance was used to position (and confirm the position of) the sheathed needle. Image(s) saved in PACS: Ultrasound ________________________     Successful ultrasound guided paracentesis of approximately 3200 mL of straw colored fluid. US RETROPERITONEUM COMP    Result Date: 11/13/2022  Ultrasound retroperitoneal INDICATIONS: Renal failure COMPARISON: None FINDINGS: The right kidney is normal in size, measuring 10.4 cm in length. Increased right renal cortical echogenicity. No solid or cystic right renal mass, hydronephrosis, or calculi. The left kidney is normal in size, measuring 10.5 cm in length. Increased left renal cortical echogenicity.  No solid or cystic left renal mass, hydronephrosis, or calculi. Gillis catheter is seen within the nondistended bladder. Partially visualized nodular increased echogenicity throughout visualized liver. Partially visualized ascites. Increased left and right renal cortical echogenicity, suggestive of chronic medical renal disease. No obstructive uropathy or acute sonographic abnormality. XR CHEST PORT    Result Date: 11/12/2022  EXAM: One view chest x-ray CLINICAL INDICATION/HISTORY: Dyspnea and lower extremity swelling. COMPARISON: 09/06/2022. TECHNIQUE: Single AP view of the chest was obtained. _______________ FINDINGS: HEART, VESSELS, MEDIASTINUM: Heart size is stable. No vascular congestion. There is atherosclerosis of the thoracic aorta. LUNGS, PLEURAL SPACES: The lungs are clear. No effusion or pneumothorax. BONY THORAX, SOFT TISSUES: Unremarkable. _______________     No acute cardiopulmonary process. CT BX RENAL    Result Date: 11/18/2022  PROCEDURE: CT-guided left native kidney medical renal core biopsy INDICATION: ARF on CKD. Nephrology request native kidney medical renal core biopsy. Note of thrombocytopenia, platelet count is sufficient per guidelines, noting patient received DDAVP prior to the procedure. COMPARISON: CT 11/16/2022 TECHNIQUE AND FINDINGS: Written and witnessed informed consent was obtained prior to the procedure. GUIDANCE: CT guidance was used to position (and confirm the position of) the needles. Image(s) saved in PACS: CT. Patient was prone. Typical sterile preparation and draping. Skin entry site was chosen with noncontrast CT. Local anesthetic with 1% lidocaine. A 16-gauge Temno coaxial biopsy set was used. From a left paraspinous approach, the guide needle was advanced into the peripheral cortex of the lower pole of the left native kidney. Limited noncontrast CT was performed confirming the needle tip in good position. 2 visually good quality 16-gauge cores of tissue were obtained.   The specimen was reviewed by the present pathologist demonstrating  adequate glomeruli. An additional, extra core was considered but deferred given persistent bleeding from the guide needle and borderline laboratory parameters and grossly sufficient glomeruli present within the first 2 cores. 5 mm Gelfoam torpedoes were deployed x2 through the guide needle as the guide needle was removed. Hemostasis was supplemented with firm manual compression. The patient tolerated the procedure well, without complications. MODERATE SEDATION: Provided interventional radiology nursing, trained, qualified independent observer, DEENA Piper with versed 1 mg IV and fentanyl 25 ugm IV with continuous monitoring of vital signs. Start time 081 [id number] 42 / End time 1100. Sedation time 17 minutes. Standard post procedure pause. _____________     Successful CT-guided left native kidney medical renal core biopsy. ECHO ADULT FOLLOW-UP OR LIMITED    Result Date: 11/16/2022    Left Ventricle: Normal left ventricular systolic function with a visually estimated EF of 65 - 70%. Right Ventricle: Right ventricle size is normal. Normal systolic function. Aortic Valve: Bonne Major Moderate stenosis of the aortic valve. AV mean gradient is 30 mmHg. AV peak gradient is 61 mmHg. AV peak velocity is 3.6-3.8 m/s. Tricuspid valve : Trace regurgitation. The estimated PASP is 37 mmHg. IVC/SVC: IVC diameter is less than or equal to 21 mm and decreases greater than 50% during inspiration; therefore the estimated right atrial pressure is normal (~3 mmHg). IVC size is normal.     ECHO ADULT FOLLOW-UP OR LIMITED    Result Date: 11/15/2022    Left Ventricle: Normal left ventricular systolic function with a visually estimated EF of 60 - 65%. Left ventricle size is normal. Increased wall thickness. Mild septal thickening. Normal wall motion. Grade I diastolic dysfunction present with normal LV EF. Aortic Valve: Mild regurgitation. Mild stenosis of the aortic valve.    Mitral Valve: Moderate annular calcification at the anterior and posterior leaflets of the mitral valve. Mild stenosis noted. Tricuspid Valve: Unable to assess RVSP due to inadequate or insignificant tricuspid regurgitation. DUPLEX LOWER EXT VENOUS BILAT    Result Date: 11/16/2022  · No evidence of deep vein thrombosis in the right lower extremity veins assessed. · No evidence of deep vein thrombosis in the left lower extremity veins assessed. DUPLEX RENAL ART/RACHID BILATERAL    Result Date: 11/18/2022  Inadequate study secondary to body habitus and increase bowel gas. Unable to document any color flow on left kidney and renal artery bilaterally.             [facility]     CC: Jayne Jacobs NP

## 2022-11-28 ENCOUNTER — HOME HEALTH ADMISSION (OUTPATIENT)
Dept: HOME HEALTH SERVICES | Facility: HOME HEALTH | Age: 82
End: 2022-11-28
Payer: MEDICARE

## 2022-11-29 LAB
ALBUMIN SERPL ELPH-MCNC: 2.7 G/DL (ref 2.9–4.4)
ALBUMIN/GLOB SERPL: 0.9 {RATIO} (ref 0.7–1.7)
ALPHA1 GLOB SERPL ELPH-MCNC: 0.3 G/DL (ref 0–0.4)
ALPHA2 GLOB SERPL ELPH-MCNC: 0.5 G/DL (ref 0.4–1)
B-GLOBULIN SERPL ELPH-MCNC: 1.2 G/DL (ref 0.7–1.3)
GAMMA GLOB SERPL ELPH-MCNC: 1.2 G/DL (ref 0.4–1.8)
GLOBULIN SER-MCNC: 3.2 G/DL (ref 2.2–3.9)
IGA SERPL-MCNC: 747 MG/DL (ref 61–437)
IGG SERPL-MCNC: 1223 MG/DL (ref 603–1613)
IGM SERPL-MCNC: 62 MG/DL (ref 15–143)
INTERPRETATION SERPL IEP-IMP: ABNORMAL
M PROTEIN SERPL ELPH-MCNC: ABNORMAL G/DL
PROT SERPL-MCNC: 5.9 G/DL (ref 6–8.5)

## 2022-11-30 ENCOUNTER — HOME CARE VISIT (OUTPATIENT)
Dept: SCHEDULING | Facility: HOME HEALTH | Age: 82
End: 2022-11-30
Payer: MEDICARE

## 2022-11-30 PROCEDURE — G0299 HHS/HOSPICE OF RN EA 15 MIN: HCPCS

## 2022-11-30 PROCEDURE — 400018 HH-NO PAY CLAIM PROCEDURE

## 2022-11-30 NOTE — Clinical Note
Completed [address] 11/30/22 for disease and medication management. SN freq 1d1, 2w2, 1w2, 2 prn. Pt requires PT, OT eval/treat.

## 2022-12-01 ENCOUNTER — HOME CARE VISIT (OUTPATIENT)
Dept: HOME HEALTH SERVICES | Facility: HOME HEALTH | Age: 82
End: 2022-12-01
Payer: MEDICARE

## 2022-12-01 VITALS
DIASTOLIC BLOOD PRESSURE: 58 MMHG | SYSTOLIC BLOOD PRESSURE: 110 MMHG | RESPIRATION RATE: 18 BRPM | OXYGEN SATURATION: 98 % | HEART RATE: 64 BPM | TEMPERATURE: 97 F

## 2022-12-02 ENCOUNTER — HOME CARE VISIT (OUTPATIENT)
Dept: SCHEDULING | Facility: HOME HEALTH | Age: 82
End: 2022-12-02
Payer: MEDICARE

## 2022-12-02 ENCOUNTER — HOME CARE VISIT (OUTPATIENT)
Dept: HOME HEALTH SERVICES | Facility: HOME HEALTH | Age: 82
End: 2022-12-02
Payer: MEDICARE

## 2022-12-02 VITALS
HEART RATE: 61 BPM | DIASTOLIC BLOOD PRESSURE: 64 MMHG | SYSTOLIC BLOOD PRESSURE: 84 MMHG | OXYGEN SATURATION: 100 % | RESPIRATION RATE: 18 BRPM | TEMPERATURE: 98.5 F

## 2022-12-02 PROCEDURE — G0152 HHCP-SERV OF OT,EA 15 MIN: HCPCS

## 2022-12-02 NOTE — CASE COMMUNICATION
herapy Functional Score Assessment  Question                                            Score   Grooming                            1   Upper Dressing   1   Lower Dressing   2   Bathing               6   Toilet Transfer                   1   Transfer                1   Ambulation                        3   Dyspnea                     1   Pain Interfering with activity        0  Est number the rapy visits     7

## 2022-12-02 NOTE — CASE COMMUNICATION
Pt presents with low blood pressure during initial evaluation (84/64) while in sitting. Notified PCP office, NP Nehal Mahmood and informed nurse Jamie Hartman about vital signs.

## 2022-12-02 NOTE — CASE COMMUNICATION
REHAB POTENTIAL: Mr. Pamela Canela presents with good rehab potential to stated goals in order to increase independence with safety and independence with ADLs, balance and functional mobility. Due to recent hospitalization, pt requires increased assistance from wife for engagement in functional daily tasks. Pt and pt's wife in agreement to continued home boy occupational therapy services to increase his safety, independence within the home an d decreased caregiver burden. PLAN: Pt frequency 1w1, 2w3.  Discharge to self and family care under MD supervision once all goals have been met or pt has reached maximum rehab potential.

## 2022-12-02 NOTE — CASE COMMUNICATION
[place]    Mr. Monalisa Magallanes lives with his wife who is the primary caregiver. She assist with IADLs and ADLs. Prior to hospitalization, pt was mod (I) with UB ADLs, LB bathing, however, always required assist with don socks due to increase swelling. Pt is max A for bathing via sponge bathe, edu spouse on allowing pt to perform UB bathing and LB bathing and pt's wife can assist with washing below knee level and feet. Edu on use of AE (long handl ed sponge, sock etc), which they report they have. He uses a rolling walker for functional mobility. Due to width of bathroom doorway, he is unable to push rolling walker into bathroom, prefer to furniture cruise until he can make it to the toilet. End goal is to use shower again. Edu on shower bench and training. Pt/pt's wife reports pt has difficulty with bed mobility, supine to sit, however, refuses to get a hospital bed. Edu on bed  railing. Goals: bed mobility, tub transfer with use of AE, LB dressing with AE and balance. Wife shares pt was recently dx with cancer last week. Please let me know if you have any questions.

## 2022-12-02 NOTE — HOME HEALTH
Clinical Condition per EPIC:     \"Misael Graham. is a 78 y.o.  male who has history of Crohn's disease, chronic kidney disease, aortic stenosis severe requiring valvular surgery presents to the emergency room with complaints of increasing lower extremity edema and shortness of breath despite adjusting his diuretic regimen at home. In the emergency room he was found to be in acute kidney failure with increasing creatinine to 5 from a baseline of 2.5 he also had some mild hypokalemia with troponin elevation nephrology was consulted and recommended Lasix IV 3 times daily as well as Zaroxolyn 10 mg daily patient was recently discharged from the hospital 1 month ago with metabolic encephalopathy from UTI. Unfortunately he has not been able to have his aortic valve surgery due to low platelets he was recently started on Promacta 50 mg daily and taken off of his prednisone 80 mg daily. He has had some worsening diarrhea and has already had 3 stools since being in our hospital. He is followed by Dr. Jaqui Whelan clinic for thrombocytopenia\". 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 He received albumin, and diuretics. Dr. Josias Garner, cardiologist and oncologist and nephrologist consulted. Paracentesis done and renal biopsy was performed. 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 His renal function not improving per treatment. Dr. Guardado Figures noted. \"Given all the comorbid conditions, lack of improvement with diuretics, worsening renal failure, high risk for procedures and lines. With no hope of renal recovery, pt will suffer on dialysis. His life expectancy with or without dialysis is less than 6 months. transition to hospice.  \" Hospice consulted. Needs to be on high dose diuretics for comfort measures and volume management. \" He also received egd indicated portal gastropathy. Discharge planning discussed with patient, pt agrees with the plan and no questions and concerns at this point\". Active Problems: Bladder calculus 2015, Chronic kidney disease H/O kidney stones, Crohn disease ([address] Foot drop left, Hypertension, Kidney calculi    SUBJECTIVE: Pt agreeable to home health occupational therapy assessment with wife presents with no concerns. Pt reports 0/10 pain on this date, however, reports increase swelling in bilateral feet. CAREGIVER INVOLVEMENT: Pt's wife assist with IADL task to include cooking, household duties and transportation, etc.   MEDICATION RECONCILIATION: Medication reconciled. DME ORDERED/RECOMMENDED: N/A     PLOF: Pt lives with wife in a one story home. Pt was modified independent with upper body ADL routines, lower body ADLs, with expection of requiring assist with donning socks/shoes due to fear of falling when reaching outside of base of support and/or ground level. OBJECTIVE:    BATHING: Pt completes bathing via sponge bath requiring maximum assist from wife. Pt's wife educated on decreasing caregiver assist and increasing pt's independence with bathing, with pt's wife verbalizing understanding and in agreement. Pt has a tub shower with grab bars and handheld shower head, however, tub lacks shower bench. TOILETING: Pt completes all aspect of toileting to include transfer to/from toilet seat, hygiene and clothing management with supervision to modified independence. UB DRESSING: Pt completes upper body dressing to include nikki/doff shirt independently. LB DRESSING: Pt completes lower body dressing with supervision, with expectation of requiring maximum assist with donning/doff socks.  Pt and pt's wife educated on use of adaptive equipment (e.g. sock aid, reacher) to improve independence, both verbalizing understanding. GROOMING: Pt completes simple grooming to include oral hygiene, hand/face washing and hair grooming with supervision to modified independence. FEEDING: Pt is independent with self feeding. OT instructed/demonstrated pt the following with good understanding:     -         energy conservation while performing bathing, dressing, and setup such as set clothing out night before, gather items required to perform task in one trip, sit while performing tasks, take rest breaks as needed, perform shower/bathing on days with no other appointments or activities scheduled, etc.     -           pt is to perform bathing/dressing tasks sitting, when possible, for safety/fall prevention. Educated on use of adaptive equipment to improve independence with functional daily routines. -           pt instructed/demonstrated one handed technique while standing to perform functional tasks with use rollator walker/grab bar for increased stability, fall prevention, and safety while standing.    -          ADL training performed for improved body mechanics, safety, and technique for reduced risk of falls and improved functional level and participation of tasks. IADL: Pt's wife assist with IADL tasks to include shopping, transporation, laundry, meal preparation, etc.     BALANCE:     STATIC STANDING/SITTING: Good     DYNAMIC STANDING/SITTING: Fair with support of an rolling walker. AMBULATION: Pt completes household functional mobility with use of rolling walker with stand by assist.     EOB/BED TRANSFER: Pt requires moderate to maximum assist with completing bed mobility, transitioning sit to supine. Pt/pt's wife educated on hospital bed and/or use of attachable bed railing to improve independence with bed mobility. COUCH: Pt is able to complete sit/stand tranfer from raised recliner with assist of assistive device with supervision.  Pt educated on proper hand placement/technique in order to safely complete transfers, with pt demonstrating good carryover and follow through. TOILET: Pt is able to transfer to/from toilet with stand by supervision. Due to decreased width of bathroom door, pt noted furniture cruises to enter to/from bathroom in prep for transfer. Pt and pt's wife educated on safety awareness. TUB SHOWER: Not tested at initial home health occupational therapy assessment. However, pt's wife reports pt completes bathing via sponge bathe. Pt and wife are recepetive to adaptive equipment training in order to utilize shower safely and independently. PATIENT REPONSE TO TREATMENT: Pt responded well to home health occupational therapy assessessment. PATIENT EDUCATION PROVIDED THIS VISIT: OT role, energy conservation technique, fall prevention/safety during I/ADLs, functional mobility, transfers, adaptive equipment, continue diet and medication as instructed per MD, consult MD or urgent care for medical assistance as opposed to ER unless situation emergent. PATIENT LEVEL OF UNDERSTANDING OF EDUCATION PROVIDED: Pt demonstrated receptivness on education and recommendation for obtaining adaptive equipment (e.g. bed rail, hospital bed, shower bench etc) in order to safely engage in functional daily tasks. REHAB POTENTIAL: Mr. Dorian Patricia presents with good rehab potential to stated goals in order to increase independence with safety and independence with ADLs, balance and functional mobility. Due to recent hospitalization, pt requires increased assistance from wife for engagement in functional daily tasks. Pt and pt's wife in agreement to continued home boy occupational therapy services to increase his safety, independence within the home and decreased caregiver burden.     CONTINUED NEED FOR THE FOLLOWING SKILLS: HH OT is medically necessary to address increased swelling, impaired bed mobility to perform ADL tasks, decreased independence and safety with functional transfers, decreased independence and safety performing ADL/IADL tasks, decreased activity and standing tolerance, decreased functional endurance, and impaired balance in order to improve functional independence, obtain set goals, reduced risk of falls, reduce pain, improve quality of life, and return to PLOF. ASSESSMENT: Pt presents with decreased standing/activity tolerance and endurance. Pt presents with fair standing balance with support of an assistive device, however, presents with decreased safety awareness during engagement of functional transfers in bathroom. Due to impaired balance, pt is at a high risk of falling. Pt would benefit from education and trials of adaptive equipment/devices to increase his level of independence with his daily routine and decreased caregiver burden. SKILLED CARE PROVIDED: Pt completed full occupational therapy assessment to include balance, coordination, strengthening, ADL education/training, functional mobility and home safety. PLAN: Pt frequency 1w1, 2w3.  Discharge to self and family care under MD supervision once all goals have been met or pt has reached maximum rehab potential.

## 2022-12-03 NOTE — HOME HEALTH
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 He received albumin, and diuretics. Dr. Colin Ash, cardiologist and oncologist and nephrologist consulted. Paracentesis done and renal biopsy was performed. 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 His renal function not improv ing per treatment. Dr. Hamida Goyal noted  Armando Montgomery all the comorbid conditions, lack of improvement with diuretics, worsening renal failure, high risk for procedures and lines. With no hope of renal recovery, pt will suffer on dialysis. His life expectancy with or without dialysis is less than 6 months. Medications reconciled, all medications listed  are at home except Dulcolax, Clarinex, Iron & Mag needs to be picked up from [facility]. Floranex, Lorazepam, and Mag ox needs prescription from PCP on Monday 12/5/22. Per pt. Prednisone and Morphine D/C. The following education was provided regarding medications, medication interactions, and look a like medications: N/A all meds reviewed. Discussed importance of compliance, timely taking all prescribed meds, proper dosage and freq. No high risk medication. Reviewed Metolazone S/E: weakness, dizziness, headaches, constipation, diarrhea & joint pain and swelling. Metoprolol S/E; feeling dizzy, tired, stomach pain, cold hands and feet. Medications are somewhat effective at this time. Caregiver: caregiver/ is available to assist with daily meals, ADL's prn, provide reminders with daily medications, run errands, groceries and MD apt. Skilled care provided: Teaching disease and medication, completed assessment. V/S WNR and denies pain. Sacral dressing change (see wound addendum). Completed [facility] admission, explained POC, SNV freq and D/C plan to pt/CG with good understanding.  (Dr. Alex Salinas notified POC including stage 2 left buttocks pressure ulcer and the treatment). Patient education provided this visit to include: Discussed intervention to prevent infection; hand washing wearing face mask when going out and avoiding sick person. Repositioning q 2 hrs,  ambulate q 2hrs as tolerated, pacing activity/energy conservation; rest in bet activity and deep breathing exercises. Safety and fall prec; clearing walkway,placing walker in easy access, avoid getting up too quickly when feeling weak, dizzy, and to call for assistance prn. Elevating BLE, continue to follow renal diet, avoid skipping meals and hydration. Monitor for S/S of infection; fever 100.4, increase pain, redness, swelling,  coughing with yellow thick sputum, purulent wound drainage with foul smell, cloudy urine with foul smell or strong odor,  not feeling well 2-3 days, SOB, and to call [facility] or MD for assistance if experiencing any of these S/S. To call 911 with chest pains, facial drooping, difficulty talking, non arousable/unconscious and uncontrollable bleeding. (Pt taught how to obtain daily V/S; B/P, sat, daily wgt and to record readings). Patient/caregiver degree of understanding: good understanding of the teaching provided during visit. Home health supplies by type and quantity ordered/delivered this visit include: Gauze pads, WC, Foam dressing, EPC    Pt./CG instructed on plan of care, PT, OT eval/treat, SN freq visit; 1d1, 2w2, 1w2, 2 prn and D/C plan. Home exercise program/Homework provided: Ambulation, HEP deep breathing exercises 10x when having SOB, pain and anxiety, IS 10x q 1-2 hrs. Plan of care and admission to home health status called to attending physician: Gamaliel Mojica NP was informed POC and admission completed 11/30/22   Discharge planning discussed with patient and caregiver.  Discharge planning as follows: Pt/CG will be able to manage disease and medication independently, and health condition stable. Pt/Caregiver did verbalize understanding of discharge planning. Patient/caregiver encouraged/instructed to keep appointment as lack of follow through with physician appointment could result in discontinuation of home care services for non-compliance. The [address] of Rights was verbally reviewed and signed by pt on this visit. The patient or representative was given the opportunity to ask pertinent questions regarding the bill of rights. COVID - 23 Screening completed before visit:       Denies and no family member  has any of these S/S:    Fever, dry cough, sore throat diarrhea, body aches, chills, not feeling well and loss of taste.

## 2022-12-05 ENCOUNTER — HOME CARE VISIT (OUTPATIENT)
Dept: SCHEDULING | Facility: HOME HEALTH | Age: 82
End: 2022-12-05
Payer: MEDICARE

## 2022-12-05 PROCEDURE — G0158 HHC OT ASSISTANT EA 15: HCPCS

## 2022-12-06 ENCOUNTER — HOME CARE VISIT (OUTPATIENT)
Dept: SCHEDULING | Facility: HOME HEALTH | Age: 82
End: 2022-12-06
Payer: MEDICARE

## 2022-12-06 VITALS
RESPIRATION RATE: 8 BRPM | DIASTOLIC BLOOD PRESSURE: 62 MMHG | HEART RATE: 58 BPM | SYSTOLIC BLOOD PRESSURE: 108 MMHG | TEMPERATURE: 97.4 F | OXYGEN SATURATION: 97 %

## 2022-12-06 PROCEDURE — G0151 HHCP-SERV OF PT,EA 15 MIN: HCPCS

## 2022-12-06 NOTE — HOME HEALTH
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 He received albumin, and diuretics. Dr. Barrington Holbrook, cardiologist and oncologist and nephrologist consulted. Paracentesis done and renal biopsy was performed. 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 His renal function not improv ing per treatment. Dr. Rebekah Mckeon noted    [place]on Dross all the comorbid conditions, lack of improvement with diuretics, worsening renal failure, high risk for procedures and lines. With no hope of renal recovery, pt will suffer on dialysis. His life expectancy with or without dialysis is less than 6 months. transition to hospice. \"   . Patient, CG declined Hospice and elected 2003 [facility]. .  Prior Medical History: Altered mental status  51/8/1566    Acute metabolic encephalopathy  12/0/7364    Thrombocytopenia  10/7/2022    Aortic stenosis  10/7/2022    CKD (chronic kidney disease)  10/7/2022    Acute kidney injury superimposed on chronic kidney disease  10/7/2022    Peripheral edema  11/12/2022    Hypoxia  11/12/2022    Acute renal failure superimposed on stage 3 chronic kidney disease  11/12/2022    Papillary renal cell carcinoma  11/23/2022    Cirrhosis 11/23/2022   List of Comorbidities:    Bladder calculus 2015    Chronic kidney disease -  H/O kidney stones    Crohn disease       Foot drop -  left    Hypertension      Kidney calculi    Subjective:   BP was low, reported to Dr. Kevin Dow, spoke with SN Nahomy, who consulted with Dr. Kevin Dow. They advised that Mr. Alarcon Service see Irma Kline today as scheduled, and they will follow up according to the results of hematology visit. That the low blood count may be the cause of the low BP.   Living /Home Situation:   Patient lives in a 1 story home with 1 step to enter, the pathways are clear, no grab bars in bathroom which is small. Caregiver involvement: Spouse is CG and asists with daily care. She is unable to lift him if he falls. PT Evaluation:  Strength:   R hip flexion 3-/5, other hip and knee strength grossly 3+/5 per MMT, sitting. L ankle foot drop with 0/5 to DF foot. ROM: WFL except L ankle DF 0 degrees due to foot drop. Transfers:   Raised recliner, SBA, dining room chair:  Mod/max A, toilet:  Unable to assess today, patient fell recently (slid) from toilet. Gait:  AD:  FWW, Gait pattern: Short step lengths, lifts L hip higher due to foot drop L, PT adjusted walker, Verbal cues needed for: positioning in walker and posture. .  Balance/ Fall risk:  Patient scored 7/28 on Tinetti Balance Test, indicating a high risk for falls. Bed mobility:  Patient needs Mod assistance, may benefot from a bedcane assist bar. Needs further review to instruct in safety. Assessment:    Patient presents with deficits, as described, in activity tolerance, strength, gait and balance that impairs his ability to transfer, navigate his home and that increases his risk for falling and his dependence. HHPT is indicated in order to provide skilled interventions to improve and restore activity tolerance, balance, strength, transfers and gait, that will improve his overall safety and lower his risk for falls and injury. Skilled interventions will include: Instruction in ther-ex, NMR, HEP for strength, balance improvement; Training in gait, transfers, balance; Education in fall prevention, safety, energy conservation, breath control/breathing techniques. PT will monitor vital signs, pain and patient response to therapy. Home exercise program: Get up every 1-2 hours to get pressure off backside. Reclined:  AP, QS, GS, Heel slides, hip abduction, SLR x 5-10 as tolerated.   Sitting:  LAQ x 5-10 as tolerated. Medications review completed. Medications reviewed and all medications are available in the home this visit. The following education was provided regarding medications, medication interactions, and look a like medications: n/a. Medications  are effective at this time  Home health supplies by type and quantity ordered/delivered this visit include: n/a  Patient education provided this visit: Educated patient re: transfer safety and getting COG over COB to prevent falling back. Ed re: pay attention to how you feel and rest as needed when performing activity. Ed re: low BP and falling; that low BP can lead to falls, need to follow MD instructions. Provided info re: bedrail assist bar/bedcane, and recommend grab bars or raised toilet seat in bathroom for safety. Patient response to education:  Verbal understanding. He attempted to improve transfers and needs further training. Patient response to treatment/evaluation:   Patient was fatigued, in no apparent sdistress. He planned to go to his hematology appointment today. Continued need for the following skills: Physical Therapy  PLAN:  PT 2w4, 1w1, Patient and caregiver are in agreement with the plan.

## 2022-12-06 NOTE — HOME HEALTH
SUBJECTIVE: Pt asleep in recliner upon arrival. Pt CG reports he did not sleep well last night and had been difficult keep awake  . CAREGIVER INVOLVEMENT/ASSISTANCE NEEDED FOR: Pt wife assist with ADLs/IADLs as needed  . HOME HEALTH SUPPLIES BY TYPE AND QUANTITY ORDERED/DELIVERED THIS VISIT INCLUDE: Pt would benefit from tub tranfers bench. Order request sent to MD. Pt CG provided with information to order bed rails. STERLING recommenced ensuring bed rail and legs that sit on floor of increased stability  . OBJECTIVE:  See interventions. .  Patient education provided this visit:  Pt educated to using BUE to push up form seat then transitioning hands to FWW to stand. When sitting ensure chair is felt on back of legs and reach back for seat and lower self down . Cristina Miner Patient level of understanding of education provided: Pt demonstrated  understanding of education provided by STERLING with completion of sit to stands implementing STERLING instructions. .  Home exercise program: NO HEP provided  . RESPONSE TO TREATMENT: Pt had a positive response to treatment with no increased complaints of pain or fatigue  . ASSESSMENT OF PROGRESS TOWARD GOALS: Functional mobility training provided. Pt demonstrates ability to complete sit to stands from recliner requiring SBA. Verbal cues required for safety, sequencing and hand placement. Continued reinforcement required due to pt impaired cognition. Pt tolerated standing X  2 min with F balance, using walker for support. Pt demonstrated good foot placement and stance for safety without cues from STERLING  .   CONTINUED NEED FOR THE FOLLOWING SKILLS: HH OT is medically necessary to address decreased functional strength,  decreased independence and safety with functional transfers, decreased independence and safety performing ADL tasks, decreased functional endurance, and impaired balance in order to improve functional independence, obtain set goals, reduce risk of falls, improve quality of life, and return to PLOF. Anirudh Garcia PLAN FOR NEXT VISIT:STERLING will address Functional mobility training.    .   THE FOLLOWING DISCHARGE PLANNING WAS DISCUSSED WITH THE PATIENT/CAREGIVER: Discharge to self and family under MD supervision once all goals have been met or patient has reached maximum potential.  Frequency remaining: 1X1,2X2

## 2022-12-06 NOTE — Clinical Note
Therapy Functional Score Assessment  Question   Score   Grooming  2       Upper Dressing 2      Lower Dressing 3      Bathing  5      Toilet Transfer  4    Transfer  2            Ambulation  3   Dyspnea                     3       Pain Interfering with activity 2  Est number therapy visits      9

## 2022-12-07 ENCOUNTER — HOSPITAL ENCOUNTER (OUTPATIENT)
Dept: INFUSION THERAPY | Age: 82
Discharge: HOME | End: 2022-12-07
Payer: MEDICARE

## 2022-12-07 ENCOUNTER — HOME CARE VISIT (OUTPATIENT)
Dept: SCHEDULING | Facility: HOME HEALTH | Age: 82
End: 2022-12-07
Payer: MEDICARE

## 2022-12-07 ENCOUNTER — HOME CARE VISIT (OUTPATIENT)
Dept: HOME HEALTH SERVICES | Facility: HOME HEALTH | Age: 82
End: 2022-12-07
Payer: MEDICARE

## 2022-12-07 VITALS
DIASTOLIC BLOOD PRESSURE: 50 MMHG | SYSTOLIC BLOOD PRESSURE: 82 MMHG | OXYGEN SATURATION: 99 % | HEART RATE: 71 BPM | TEMPERATURE: 98.3 F | RESPIRATION RATE: 16 BRPM

## 2022-12-07 LAB — HISTORY CHECKED?,CKHIST: NORMAL

## 2022-12-07 PROCEDURE — 86920 COMPATIBILITY TEST SPIN: CPT

## 2022-12-07 PROCEDURE — 86900 BLOOD TYPING SEROLOGIC ABO: CPT

## 2022-12-07 PROCEDURE — G0299 HHS/HOSPICE OF RN EA 15 MIN: HCPCS

## 2022-12-07 PROCEDURE — 36415 COLL VENOUS BLD VENIPUNCTURE: CPT

## 2022-12-08 ENCOUNTER — HOSPITAL ENCOUNTER (OUTPATIENT)
Dept: INFUSION THERAPY | Age: 82
Discharge: HOME | End: 2022-12-08
Payer: MEDICARE

## 2022-12-08 ENCOUNTER — HOME CARE VISIT (OUTPATIENT)
Dept: HOME HEALTH SERVICES | Facility: HOME HEALTH | Age: 82
End: 2022-12-08
Payer: MEDICARE

## 2022-12-08 VITALS
TEMPERATURE: 96.3 F | OXYGEN SATURATION: 98 % | SYSTOLIC BLOOD PRESSURE: 80 MMHG | RESPIRATION RATE: 18 BRPM | DIASTOLIC BLOOD PRESSURE: 50 MMHG | HEART RATE: 74 BPM

## 2022-12-08 VITALS
HEART RATE: 57 BPM | DIASTOLIC BLOOD PRESSURE: 36 MMHG | OXYGEN SATURATION: 100 % | TEMPERATURE: 97.6 F | SYSTOLIC BLOOD PRESSURE: 84 MMHG | RESPIRATION RATE: 18 BRPM

## 2022-12-08 PROCEDURE — 77030013169 SET IV BLD ICUM -A

## 2022-12-08 PROCEDURE — 36430 TRANSFUSION BLD/BLD COMPNT: CPT

## 2022-12-08 PROCEDURE — P9040 RBC LEUKOREDUCED IRRADIATED: HCPCS

## 2022-12-08 PROCEDURE — P9016 RBC LEUKOCYTES REDUCED: HCPCS

## 2022-12-08 PROCEDURE — 74011000258 HC RX REV CODE- 258: Performed by: FAMILY MEDICINE

## 2022-12-08 PROCEDURE — 74011250637 HC RX REV CODE- 250/637: Performed by: FAMILY MEDICINE

## 2022-12-08 RX ORDER — SODIUM CHLORIDE 9 MG/ML
250 INJECTION, SOLUTION INTRAVENOUS AS NEEDED
Status: DISCONTINUED | OUTPATIENT
Start: 2022-12-08 | End: 2022-12-10 | Stop reason: HOSPADM

## 2022-12-08 RX ORDER — ACETAMINOPHEN 325 MG/1
650 TABLET ORAL
Status: COMPLETED | OUTPATIENT
Start: 2022-12-08 | End: 2022-12-08

## 2022-12-08 RX ADMIN — SODIUM CHLORIDE 15 ML: 0.9 INJECTION, SOLUTION INTRAVENOUS at 10:30

## 2022-12-08 RX ADMIN — ACETAMINOPHEN 650 MG: 325 TABLET ORAL at 10:02

## 2022-12-08 NOTE — HOME HEALTH
Skilled reason for visit: RN pt assessment, wound care, Education  Caregiver involvement: present  Medications reviewed and all medications are available in the home this visit. The following education was provided regarding medications: Antihypertensives, Diuretics, BPH  MD notified of any discrepancies/look a-like medications/medication interactions: n/a  Medications are in the home at this time.     Home health supplies by type and quantity ordered/delivered this visit include: n/a  Patient education provided this visit: SAFETY, meds, BPH, CKD, health maintenence  Sharps education provided: n/a  Patient level of understanding of education provided:  needs reinforcement  Skilled Care Performed this visit: Assessment, Education, Wound care   Patient response to procedure performed:  good  Patient's Progress towards personal goals: progressing  Home exercise program: Compliant  Continued need for the following skills: Fall Prevention, Medication Education, Wound Assessment & care  Plan for next visit: as above   Patient and/or caregiver notified and agrees to changes in the Plan of Care YES/NO/NA:    The following discharge planning was discussed with the pt/caregiver:  Discharge when all goals are met

## 2022-12-08 NOTE — PROGRESS NOTES
[facility] Progress Note    Date: 2022    Name: Roger Valdez. MRN: [id number]         : 1940      Mr. Vida Marks arrived to [facility] via wheelchair at 0900 accompanied by wife. Mr. Vida Marks was assessed and education was provided. Discussed risks and benefits of blood transfusion with patient, including risk of transfusion reaction and disease transmission. Patient and wife verbalized understanding and signed consent placed on chart. Noted hypotensive pre transfusion 67/29, 66/38, and 66/36. Patient's wife stated patient usually takes Metoprolol PO this am. Dr. Fabian Velásquez was called and was informed that patient was hypotensive. Provider ordered to continue with blood transfusion and stop metoprolol. Patient's wife was instructed to stop Metoprolol PO per Dr. Swanson Friend and she verbalized understanding. Mr. Nadege Freire vitals were reviewed. Visit Vitals  BP (!) 84/36   Pulse (!) 57   Temp 97.6 °F (36.4 °C)   Resp 18   SpO2 100%       22 g IV inserted in patient's Left forearm left, condition patent and no redness x2 attempt. Positive for blood return and flushes without difficulty. Normal saline initiated at [facility]. Pre-medications (Tylenol 650 mg ) were administered as ordered. First unit of PRBCs initiated @ 75 ml/hr at 1100. Fifteen minutes into infusion, VS stable and pt denied c/o SOB, itching/hives, lip/tongue/facial swelling, CP or other complaints. Infusion rate increased to 100 ml/hr. Fifteen minutes later, VS stable and pt denied complaints; infusion rate increased to 150 ml/hr for the remainder of the transfusion. Unit finished @ 1339. VS stable and no transfusion reaction suspected. Mr. Vida Marks tolerated infusion without complaints. IV removed. No irritation, bleeding, or hematoma noted at site. Gauze and coban applied to site. Patient remained in [facility] for 30 minutes for observation. No signs of allergic reaction noted. Discharge instructions reviewed with pt.  Pt instructed to report SOB, CP, elevated temp, back pain, or other symptoms of transfusion reaction to MD or ED. Pt verbalized understanding. Patient armband removed and shredded    Mr. Joshua Tristan was discharged from [place] [address] in stable condition at 1510. No future appointments at this time.     Twila Ceballos RN  December 8, 2022

## 2022-12-09 ENCOUNTER — HOME CARE VISIT (OUTPATIENT)
Dept: HOME HEALTH SERVICES | Facility: HOME HEALTH | Age: 82
End: 2022-12-09
Payer: MEDICARE

## 2022-12-09 ENCOUNTER — HOME CARE VISIT (OUTPATIENT)
Dept: SCHEDULING | Facility: HOME HEALTH | Age: 82
End: 2022-12-09
Payer: MEDICARE

## 2022-12-09 PROCEDURE — G0158 HHC OT ASSISTANT EA 15: HCPCS

## 2022-12-09 NOTE — CASE COMMUNICATION
Per case communication from [place], [place], pt is requesting no more visits this week and does not want to be seen again until after [address] next week. Today's SN visit is a missed visit.

## 2022-12-10 LAB
ABO + RH BLD: NORMAL
BLD PROD TYP BPU: NORMAL
BLOOD GROUP ANTIBODIES SERPL: NORMAL
BPU ID: NORMAL
CROSSMATCH RESULT,%XM: NORMAL
SPECIMEN EXP DATE BLD: NORMAL
STATUS OF UNIT,%ST: NORMAL
UNIT DIVISION, %UDIV: 0

## 2022-12-12 ENCOUNTER — HOME CARE VISIT (OUTPATIENT)
Dept: SCHEDULING | Facility: HOME HEALTH | Age: 82
End: 2022-12-12
Payer: MEDICARE

## 2022-12-12 VITALS
SYSTOLIC BLOOD PRESSURE: 110 MMHG | HEART RATE: 70 BPM | TEMPERATURE: 98 F | DIASTOLIC BLOOD PRESSURE: 58 MMHG | OXYGEN SATURATION: 98 %

## 2022-12-12 VITALS
SYSTOLIC BLOOD PRESSURE: 119 MMHG | DIASTOLIC BLOOD PRESSURE: 64 MMHG | OXYGEN SATURATION: 96 % | HEART RATE: 61 BPM | RESPIRATION RATE: 16 BRPM | TEMPERATURE: 97.1 F

## 2022-12-12 PROCEDURE — G0495 RN CARE TRAIN/EDU IN HH: HCPCS

## 2022-12-12 NOTE — HOME HEALTH
Subjective: Pt CG reports Pt is having difficulty with word finding this morning  . HOME HEALTH SUPPLIES BY TYPE AND QUANTITY ORDERED/DELIVERED THIS VISIT INCLUDE: Tub transfer bench request has been sent to MD. PT CG has been provided information to order bed rails and shower head cosme  . OBJECTIVE:  See interventions. .  Patient education provided this visit:  Pt educated on safe transfer sequence and keeping walker with him until seated an then placing out of way. .  Patient level of understanding of education provided: Pt verbalized  understanding of education provided by HALLIE by repeating back. .  Home exercise program: BUE- Shoulder flexion, shoulder abduction, horizontal adduction, elbow flexion, elbow extension, Forearm pronation and supination 10 reps X 2 sets with using water bottle  . RESPONSE TO TREATMENT: Pt had a positive response to treatment with no increased complaints of pain  . ASSESSMENT OF PROGRESS TOWARD GOALS: UE HE provided for increased UE strength needed for functional transfers. Pt completed UE therex with moderate visual and cues for proper form and pacing to maximize therapeutic benefit. Continue reinforcement required . Pt required verbal cues to sequence safe transfer and proper hand placement. Pt able to complete sit to stands with Min A for balance and safety  . CONTINUED NEED FOR THE FOLLOWING SKILLS: HH OT is medically necessary to address decreased functional strength,  decreased independence and safety with functional transfers, decreased independence and safety performing ADL/IADL tasks, decreased functional endurance, and impaired balance in order to improve functional independence, obtain set goals, reduce risk of falls, improve quality of life, and return to PLOF. Anirudh Garcia PLAN FOR NEXT VISIT:HALLIE will address ADL training.    .   THE FOLLOWING DISCHARGE PLANNING WAS DISCUSSED WITH THE PATIENT/CAREGIVER: Discharge to self and family under MD supervision once all goals have been met or patient has reached maximum potential.  Frequency remaining: 2X2

## 2022-12-13 NOTE — HOME HEALTH
Skilled reason for visit: Patient was recently hospitalized for CKD, requiring observation by a SN for s/s of decomposition or adverse effects resulting from newly prescribed medications. Skilled observation needed to determine if new medication regimen prescribed requires modifications or other therapeutic interventions considered until pt's clinical condition or treatment has stabilized. Caregiver involvement:  Patient's caregiver is his spouse. Caregiver assists patient with bathing, dressing, walking, bathroom, meal prep and setup, medication management, grocery shopping, household chores, transportation to MD appointment and home exercise program.  Medications reconciled and all medications are available in the home this visit. The following education was provided regarding medications, medication interactions, and look alike modifications. torsemide (DEMADEX) 100 mg tablet       Contact Agency or MD with questions. Medications are effective at this time. Patient states understanding. Patient education provided this visit:  Camilo Torres Disease Management: CKD, bilateral edema and elevation of bilateral legs teaching, wound care, fall precautions, s/s of infection  Patient level of understanding of education provided: Pt verbalized understanding of all education and repeated back teaching   Skilled Care Performed this visit: Disease process teaching, medication teaching, physical assessment and monitoring  Patient response to procedure performed:  Pt verbalized satisfaction with wound care  Sharps Education Provided: NA  Goals/teaching progressing. Patient's goal is to have less edema. Progressing toward goals. Patient has remained free from falls, free from infection; no safety concerns at this time and is ambulating with supervision.   SN to complete education of patient and patient to follow up with any further questions or concerns with Dr Mindy Naik exercise program: PT  Continued need for the following skills: Nursing, PT   Patient and/or caregiver notified and agree to changes in the Plan of Care: No changes   The following discharge planning was discussed with the pt/caregiver:  SN to continue education of patient and discharge patient when teaching and goals are met.

## 2022-12-14 ENCOUNTER — HOME CARE VISIT (OUTPATIENT)
Dept: SCHEDULING | Facility: HOME HEALTH | Age: 82
End: 2022-12-14
Payer: MEDICARE

## 2022-12-14 VITALS
HEART RATE: 67 BPM | RESPIRATION RATE: 16 BRPM | OXYGEN SATURATION: 95 % | DIASTOLIC BLOOD PRESSURE: 50 MMHG | TEMPERATURE: 97.6 F | SYSTOLIC BLOOD PRESSURE: 110 MMHG

## 2022-12-14 VITALS
SYSTOLIC BLOOD PRESSURE: 117 MMHG | HEART RATE: 98 BPM | TEMPERATURE: 97.5 F | RESPIRATION RATE: 16 BRPM | OXYGEN SATURATION: 83 % | DIASTOLIC BLOOD PRESSURE: 70 MMHG

## 2022-12-14 PROCEDURE — G0157 HHC PT ASSISTANT EA 15: HCPCS

## 2022-12-14 PROCEDURE — G0158 HHC OT ASSISTANT EA 15: HCPCS

## 2022-12-15 ENCOUNTER — HOME CARE VISIT (OUTPATIENT)
Dept: SCHEDULING | Facility: HOME HEALTH | Age: 82
End: 2022-12-15
Payer: MEDICARE

## 2022-12-15 ENCOUNTER — HOME CARE VISIT (OUTPATIENT)
Dept: HOME HEALTH SERVICES | Facility: HOME HEALTH | Age: 82
End: 2022-12-15
Payer: MEDICARE

## 2022-12-15 ENCOUNTER — HOSPITAL ENCOUNTER (EMERGENCY)
Age: 82
Discharge: HOME | End: 2022-12-15
Attending: EMERGENCY MEDICINE
Payer: MEDICARE

## 2022-12-15 ENCOUNTER — APPOINTMENT (OUTPATIENT)
Dept: GENERAL RADIOLOGY | Age: 82
End: 2022-12-15
Attending: EMERGENCY MEDICINE
Payer: MEDICARE

## 2022-12-15 ENCOUNTER — APPOINTMENT (OUTPATIENT)
Dept: CT IMAGING | Age: 82
End: 2022-12-15
Attending: EMERGENCY MEDICINE
Payer: MEDICARE

## 2022-12-15 VITALS
TEMPERATURE: 97.1 F | HEART RATE: 63 BPM | DIASTOLIC BLOOD PRESSURE: 62 MMHG | SYSTOLIC BLOOD PRESSURE: 111 MMHG | RESPIRATION RATE: 16 BRPM | OXYGEN SATURATION: 95 %

## 2022-12-15 VITALS
DIASTOLIC BLOOD PRESSURE: 50 MMHG | OXYGEN SATURATION: 99 % | SYSTOLIC BLOOD PRESSURE: 93 MMHG | WEIGHT: 237 LBS | BODY MASS INDEX: 31.41 KG/M2 | TEMPERATURE: 97.8 F | HEART RATE: 85 BPM | HEIGHT: 73 IN | RESPIRATION RATE: 10 BRPM

## 2022-12-15 DIAGNOSIS — N19 UREMIA: ICD-10-CM

## 2022-12-15 DIAGNOSIS — R53.81 PHYSICAL DECONDITIONING: ICD-10-CM

## 2022-12-15 DIAGNOSIS — N17.9 ACUTE RENAL FAILURE SUPERIMPOSED ON CHRONIC KIDNEY DISEASE, UNSPECIFIED CKD STAGE, UNSPECIFIED ACUTE RENAL FAILURE TYPE (HCC): Primary | ICD-10-CM

## 2022-12-15 DIAGNOSIS — E87.6 HYPOKALEMIA: ICD-10-CM

## 2022-12-15 DIAGNOSIS — N18.9 ACUTE RENAL FAILURE SUPERIMPOSED ON CHRONIC KIDNEY DISEASE, UNSPECIFIED CKD STAGE, UNSPECIFIED ACUTE RENAL FAILURE TYPE (HCC): Primary | ICD-10-CM

## 2022-12-15 LAB
ALBUMIN SERPL-MCNC: 4.1 G/DL (ref 3.4–5)
ALBUMIN/GLOB SERPL: 1.4 {RATIO} (ref 0.8–1.7)
ALP SERPL-CCNC: 77 U/L (ref 45–117)
ALT SERPL-CCNC: 26 U/L (ref 16–61)
ANION GAP SERPL CALC-SCNC: 15 MMOL/L (ref 3–18)
APPEARANCE UR: CLEAR
AST SERPL-CCNC: 22 U/L (ref 10–38)
BACTERIA URNS QL MICRO: NEGATIVE /HPF
BASOPHILS # BLD: 0 K/UL (ref 0–0.1)
BASOPHILS NFR BLD: 0 % (ref 0–2)
BILIRUB SERPL-MCNC: 2.2 MG/DL (ref 0.2–1)
BILIRUB UR QL: NEGATIVE
BUN SERPL-MCNC: 156 MG/DL (ref 7–18)
BUN/CREAT SERPL: 20 (ref 12–20)
CALCIUM SERPL-MCNC: 9.2 MG/DL (ref 8.5–10.1)
CALCULATED R AXIS, ECG10: 48 DEGREES
CALCULATED T AXIS, ECG11: -25 DEGREES
CHLORIDE SERPL-SCNC: 94 MMOL/L (ref 100–111)
CO2 SERPL-SCNC: 30 MMOL/L (ref 21–32)
COLOR UR: YELLOW
CREAT SERPL-MCNC: 7.91 MG/DL (ref 0.6–1.3)
DIAGNOSIS, 93000: NORMAL
DIFFERENTIAL METHOD BLD: ABNORMAL
EOSINOPHIL # BLD: 0.1 K/UL (ref 0–0.4)
EOSINOPHIL NFR BLD: 4 % (ref 0–5)
EPITH CASTS URNS QL MICRO: NEGATIVE /LPF (ref 0–5)
ERYTHROCYTE [DISTWIDTH] IN BLOOD BY AUTOMATED COUNT: 15 % (ref 11.6–14.5)
FLUAV RNA SPEC QL NAA+PROBE: NOT DETECTED
FLUBV RNA SPEC QL NAA+PROBE: NOT DETECTED
GLOBULIN SER CALC-MCNC: 3 G/DL (ref 2–4)
GLUCOSE SERPL-MCNC: 145 MG/DL (ref 74–99)
GLUCOSE UR STRIP.AUTO-MCNC: NEGATIVE MG/DL
HCT VFR BLD AUTO: 26.1 % (ref 36–48)
HGB BLD-MCNC: 9 G/DL (ref 13–16)
HGB UR QL STRIP: ABNORMAL
IMM GRANULOCYTES # BLD AUTO: 0 K/UL (ref 0–0.04)
IMM GRANULOCYTES NFR BLD AUTO: 0 % (ref 0–0.5)
KETONES UR QL STRIP.AUTO: NEGATIVE MG/DL
LEUKOCYTE ESTERASE UR QL STRIP.AUTO: NEGATIVE
LYMPHOCYTES # BLD: 0.4 K/UL (ref 0.9–3.6)
LYMPHOCYTES NFR BLD: 16 % (ref 21–52)
MAGNESIUM SERPL-MCNC: 2.1 MG/DL (ref 1.6–2.6)
MCH RBC QN AUTO: 34.6 PG (ref 24–34)
MCHC RBC AUTO-ENTMCNC: 34.5 G/DL (ref 31–37)
MCV RBC AUTO: 100.4 FL (ref 78–100)
MONOCYTES # BLD: 0.2 K/UL (ref 0.05–1.2)
MONOCYTES NFR BLD: 9 % (ref 3–10)
NEUTS SEG # BLD: 1.7 K/UL (ref 1.8–8)
NEUTS SEG NFR BLD: 71 % (ref 40–73)
NITRITE UR QL STRIP.AUTO: NEGATIVE
NRBC # BLD: 0 K/UL (ref 0–0.01)
NRBC BLD-RTO: 0 PER 100 WBC
PH UR STRIP: 5 [PH] (ref 5–8)
PLATELET # BLD AUTO: 71 K/UL (ref 135–420)
PLATELET COMMENTS,PCOM: ABNORMAL
PMV BLD AUTO: 10.4 FL (ref 9.2–11.8)
POTASSIUM SERPL-SCNC: 2.7 MMOL/L (ref 3.5–5.5)
PROT SERPL-MCNC: 7.1 G/DL (ref 6.4–8.2)
PROT UR STRIP-MCNC: NEGATIVE MG/DL
Q-T INTERVAL, ECG07: 396 MS
QRS DURATION, ECG06: 152 MS
QTC CALCULATION (BEZET), ECG08: 502 MS
RBC # BLD AUTO: 2.6 M/UL (ref 4.35–5.65)
RBC #/AREA URNS HPF: NORMAL /HPF (ref 0–5)
RBC MORPH BLD: ABNORMAL
RBC MORPH BLD: ABNORMAL
SARS-COV-2, COV2: NOT DETECTED
SODIUM SERPL-SCNC: 139 MMOL/L (ref 136–145)
SP GR UR REFRACTOMETRY: 1.01 (ref 1–1.03)
TROPONIN-HIGH SENSITIVITY: 100 NG/L (ref 0–78)
UROBILINOGEN UR QL STRIP.AUTO: 0.2 EU/DL (ref 0.2–1)
VENTRICULAR RATE, ECG03: 97 BPM
WBC # BLD AUTO: 2.4 K/UL (ref 4.6–13.2)

## 2022-12-15 PROCEDURE — 74011250637 HC RX REV CODE- 250/637: Performed by: EMERGENCY MEDICINE

## 2022-12-15 PROCEDURE — G0495 RN CARE TRAIN/EDU IN HH: HCPCS

## 2022-12-15 PROCEDURE — 74011250636 HC RX REV CODE- 250/636: Performed by: EMERGENCY MEDICINE

## 2022-12-15 PROCEDURE — 83735 ASSAY OF MAGNESIUM: CPT

## 2022-12-15 PROCEDURE — 96361 HYDRATE IV INFUSION ADD-ON: CPT

## 2022-12-15 PROCEDURE — 81001 URINALYSIS AUTO W/SCOPE: CPT

## 2022-12-15 PROCEDURE — 70450 CT HEAD/BRAIN W/O DYE: CPT

## 2022-12-15 PROCEDURE — 96360 HYDRATION IV INFUSION INIT: CPT

## 2022-12-15 PROCEDURE — 85025 COMPLETE CBC W/AUTO DIFF WBC: CPT

## 2022-12-15 PROCEDURE — 84484 ASSAY OF TROPONIN QUANT: CPT

## 2022-12-15 PROCEDURE — 71045 X-RAY EXAM CHEST 1 VIEW: CPT

## 2022-12-15 PROCEDURE — 99285 EMERGENCY DEPT VISIT HI MDM: CPT

## 2022-12-15 PROCEDURE — 87636 SARSCOV2 & INF A&B AMP PRB: CPT

## 2022-12-15 PROCEDURE — 80053 COMPREHEN METABOLIC PANEL: CPT

## 2022-12-15 RX ADMIN — SODIUM CHLORIDE 500 ML: 9 INJECTION, SOLUTION INTRAVENOUS at 11:40

## 2022-12-15 RX ADMIN — POTASSIUM BICARBONATE 40 MEQ: 782 TABLET, EFFERVESCENT ORAL at 12:40

## 2022-12-15 NOTE — HOME HEALTH
SUBJECTIVE: Patient caregiver reports increased confusion at this time. Therapist witnessed patient with decreased processing and poor carry over of task at this time. PAIN: see pain assessment      OBJECTIVE: see interventions      NEXT MD APPT: 12/20/22 PCP. CAREGIVER ASSISTANCE NEEDED FOR: Caregiver, patients wife, needed for ADLs, IADLs, gait, transfers, stair navigation and transportation to/from medical appointments. ASSESSMENT AND PROGRESS TOWARD GOALS:  Patient demonstrated a poor result to therapy this date as evidenced by decreased processing and carry over of task throughout therapy session. Therapist initiated gait training x 50' RW CGA for safety with noted LLE foot drop and decreased RLE step height with therapist giving verbal cues for correction, however poor carry over and processing noted. Therapist instructed patient in standing therex with noted visual fatigue witness by therapist, discontinuing therex at this time to ensure safe gait to seated surface. Patient required CGA for SPTs with RW requiring max cues for direction and body mechanics for safe descend. Post, therapist assessed BP reading 112/60, improving from initial BP of 110/50. PATIENT RESPONSE TO TREATMENT:  Improved BP to 112/60 post standing therex. PATIENT LEVEL OF UNDERSTANDING OF EDUCATION: Poor carry over and processing of all cues. CONTINUED NEED FOR THE FOLLOWING SKILLS: HH PT is medically necessary to address pain, decreased ROM, decreased strength, increased swelling, impaired bed mobility, decreased independence with functional transfers, impaired gait, impaired stair negotiation, and impaired balance in order to improve functional independence, quality of life, return to PLOF, reduce the risk for falls, and reduce pain. PLAN: Continued strengthening exercises, increased gait distance with improvements to gait pattern.           DISCHARGE PLANNING DISCUSSED: Patient to continue HHPT 2w3, 1w1 at this time.

## 2022-12-15 NOTE — PROGRESS NOTES
CM consult received for assistance w/ hospice. 1353: CM spoke to [facility] and confirmed they have the referral and will call the pt's spouse tonight to set up an admission for tomorrow. CM spoke w/ pt's spouse. Confirmed information on the pt's face sheet. Discussed Hospice at home. She verbalized an understanding that she will be the primary care giver. Discussed hospice goals. She verbalized an understanding. Pt is currently open to [facility]. Mrs. Francesca Payan is in agreement w/ the pt discharging back home today and verbalized an understanding to call [address] if she has an emergency. Discussed [address] for Hospice. She has chosen [facility]. Advised her they will call her today to arrange a time to admit the pt tomorrow. She verbalized an understanding. Discussed d/c home. She feels the pt is too weak for her to get him into the house. Pt will need stretcher transport home. CM spoke w/ Bernice Hayward and informed her of the above and that a hospice order is needed. Bernice Hayward will communicate the above to Dr. Tip Stewart. CM will call [facility].

## 2022-12-15 NOTE — ED NOTES
Case management called, spoke with hospice. Hospice is to call wife tonight to admit patient tomorrow.

## 2022-12-15 NOTE — ED NOTES
I have reviewed discharge instructions with the patient and spouse. The patient and spouse verbalized understanding.  Patient to have hospice call tonight and start tomorrow

## 2022-12-15 NOTE — Clinical Note
Thank you    ----- Message -----  From: Carlos Kent RN  Sent: 12/15/2022   8:04 PM EST  To: Mykel Schmitt, PT, Yolanda Queen PT, *      At today's SN visit, pt was found with an AMS, unable to string a sentence together, had not been able to walk or get up to use the bathroom since yesterday afternoon, bilateral edema present and his wife was very worried about him. After SN evaluating him, I called 911 to have him taken to [address] [place] [address] for re-evaluation there. He was seen in the ER and then sent home this evening, they have now agreed to Hospice. They have chosen [facility].

## 2022-12-15 NOTE — ED TRIAGE NOTES
Patient arrived via ems from home (he lives with hs wife)she reports he woke upon yesterday morning with AMS.  Patient is alert to person,place and knows who the president is year 200

## 2022-12-15 NOTE — ED NOTES
Patient incont of urine wearing a diaper.  Patient cleaned and changed and placed external male condom for urine collection

## 2022-12-16 ENCOUNTER — HOME CARE VISIT (OUTPATIENT)
Dept: HOME HEALTH SERVICES | Facility: HOME HEALTH | Age: 82
End: 2022-12-16
Payer: MEDICARE

## 2022-12-16 NOTE — HOME HEALTH
[facility] non-billable discharge from 12/15/22 SN visit, as patient has been transferred to Hospice as of today. AMS of pt at 12/15/22 SN visit and why 911 was called and patient taken back to [place] ER, this is the reason for decline in all M and GG questions. Buttocks stage 2 pressure ulcer has decerased in size with using protective cream over and around the area. No open areas noted at this time.

## 2022-12-16 NOTE — HOME HEALTH
SUBJECTIVE: Pt caregiver reports pt has been confused this morning with increased difficulty ambulating. CG reports she had to maneuver walker and provided prompting to ambulate to den. MD office notified of pt altered cognitive status  . CAREGIVER INVOLVEMENT/ASSISTANCE NEEDED FOR: Pt wife provides support with ADLs/IADLs  . HOME HEALTH SUPPLIES BY TYPE AND QUANTITY ORDERED/DELIVERED THIS VISIT INCLUDE: Tub transfer bench order request has been sent to MD. Estefani Calvin contacted MD to follow up on order request. Message left with office staff  . OBJECTIVE:  See interventions. .  Patient education provided this visit:  Pt wife educated on pt and CG safety during functional mobility. STERLING instructed caregiver to not attempt to ambulate pt when he is unable to follow follow commands or process proper use of walker. CG verbalized understanding and reports she will provide pt with urinal  .  RESPONSE TO TREATMENT: Pt unable to participate in addressing OT goals this visit. .  ASSESSMENT OF PROGRESS TOWARD GOALS: Pt unable to follow 1 step commands this visit, requiring physical assistance to lift arm to have blood pressure taken and to uncross legs. Pt presents with increased confusion, unable to engage in conversation. Education provided on benefit of use of urinal and limiting ambulation for pt safety and to decrease fall risk. Caregiver verbalized understanding. STERLING provided education on signs and symptoms of UTI that can possibly cause altered cognition (frequent urge to urinate, urine discoloration, pain and burning with urination). MD contacted and made aware of education provided and pts increased difficulty with following directions or sequence task. Pt had MD appointment scheduled for 12/20/22  .   CONTINUED NEED FOR THE FOLLOWING SKILLS: [facility] OT is medically necessary to address decreased functional strength,  decreased independence and safety with functional transfers, decreased independence and safety performing ADL tasks, decreased functional endurance, and impaired balance in order to improve functional independence, obtain set goals, reduce risk of falls, improve quality of life, and return to PLOF. Qiana Murphy PLAN FOR NEXT VISIT:STERLING will address functional transfer  training.    .   THE FOLLOWING DISCHARGE PLANNING WAS DISCUSSED WITH THE PATIENT/CAREGIVER: Discharge to self and family under MD supervision once all goals have been met or patient has reached maximum potential.  Frequency remaining: [place]

## 2022-12-16 NOTE — HOME HEALTH
Skilled reason for visit: Patient was recently hospitalized for CKD, requiring observation by a SN for s/s of decomposition or adverse effects resulting from newly prescribed medications. Skilled observation needed to determine if new medication regimen prescribed requires modifications or other therapeutic interventions considered until pt's clinical condition or treatment has stabilized. Caregiver involvement:  Patient's caregiver is his spouse . Caregiver assists patient with bathing, dressing, walking, bathroom, meal prep and setup, medication management, grocery shopping, household chores, transportation to MD appointment and home exercise program.  Medications reconciled and all medications are available in the home this visit. The following education was provided regarding medications, medication interactions, and look alike modifications. morphine (ROXANOL) 20 mg/mL concentrated solution         Contact Agency or MD with questions. Medications are effective at this time. Patient states understanding. Patient education provided this visit:  Camilo Burt Disease Management: AMS, edema, CKD teaching  Patient level of understanding of education provided: Cg verbalized understanding of all education and repeated back teaching   Skilled Care Performed this visit: Disease process teaching, medication teaching, physical assessment and monitoring  Patient response to procedure performed:  ENOC  Sharps Education Provided: ENOC  Goals/teaching progressing. Patient's/cg's goal is to be able to ambulate. Please see notes about ER  Home exercise program: PT, OT  Continued need for the following skills: Nursing, PT, OT   Patient and/or caregiver notified and agree to changes in the Plan of Care: YES   The following discharge planning was discussed with the pt/caregiver:  SN to continue education of patient and discharge patient when teaching and goals are met.   At today's SN visit, pt was found with an AMS, unable to string a sentence together, had not been able to walk or get up to use the bathroom since yesterday afternoon, bilateral edema present and his wife was very worried about him. After SN evaluating him, I called 911 to have him taken to [address] [place] [address] for re-evaluation there. He was seen in the ER and then sent home this evening, they have now agreed to Hospice. They have chosen [facility].

## 2022-12-16 NOTE — CASE COMMUNICATION
At today's SN visit, pt was found with an AMS, unable to string a sentence together, had not been able to walk or get up to use the bathroom since yesterday afternoon, bilateral edema present and his wife was very worried about him. After SN evaluating him, I called 911 to have him taken to [address] [place] [address] for re-evaluation there. He was seen in the ER and then sent home this evening, they have now agreed to Hospice. They have chosen [facility].

## 2022-12-17 ENCOUNTER — HOME CARE VISIT (OUTPATIENT)
Dept: SCHEDULING | Facility: HOME HEALTH | Age: 82
End: 2022-12-17
Payer: MEDICARE

## 2022-12-17 PROCEDURE — G0299 HHS/HOSPICE OF RN EA 15 MIN: HCPCS

## 2022-12-17 PROCEDURE — G0156 HHCP-SVS OF AIDE,EA 15 MIN: HCPCS

## 2022-12-17 NOTE — HOSPICE
The following standard precautions for infection control were utilized:  [place], [address] provided per established plan of care:  YES    Patient is without complaints during care provided. Patient requests: N/A    Family/Caregiver requests:  N/A    Communicated to , Clinical Manager, or Director regarding patient/family/caregiver/aide findings:  N/A    Status of patient upon end of hospice aide visit:  IN BED, RESTIN COMFORTABLY.

## 2022-12-17 NOTE — HOSPICE
Upon arrival patient resting in bed comfortably, patient tolerated assessment no distress, caregiver present in home, advised caregiver to call 24 hr on call number if needed, questions/concerns answered during visit, will continue to monitor.

## 2022-12-19 ENCOUNTER — HOME CARE VISIT (OUTPATIENT)
Dept: SCHEDULING | Facility: HOME HEALTH | Age: 82
End: 2022-12-19
Payer: MEDICARE

## 2022-12-19 ENCOUNTER — HOME CARE VISIT (OUTPATIENT)
Dept: HOSPICE | Facility: HOSPICE | Age: 82
End: 2022-12-19
Payer: MEDICARE

## 2022-12-19 PROCEDURE — G0299 HHS/HOSPICE OF RN EA 15 MIN: HCPCS

## 2022-12-19 NOTE — ED PROVIDER NOTES
EMERGENCY DEPARTMENT HISTORY AND PHYSICAL EXAM      Date: 12/15/2022  Patient Name: Hayden Hitchcock. History of Presenting Illness     Chief Complaint   Patient presents with    Lethargy       History Provided By: Patient and wife    HPI: Hayden Hitchcock., 78 y.o. male with PMHx as noted below presents to the ED for evaluation of AMS and functional decline. Per family there had been a functional decline since recent hospital discharge. He had recently been discharge from our facility on hospice however family had decided to have home health, that of hospice at the time. They note that patient has had a gradual functional decline however seem to have an acute change in his status yesterday. They note that he was confused yesterday and has stopped getting out of bed, seems less interested in eating or drinking. When he was discharged in the hospital he had a 6-month  prognosis due to his progressive renal failure and decision not to pursue dialysis. They do note that his mental status does seem to be a little better today than yesterday but still seems more confused than baseline and has not been ambulating in the house. Otherwise history is limited due to altered mental status however when asked patient has no complaints at this time    PCP: Vick Wood MD    Current Outpatient Medications   Medication Sig Dispense Refill    doxazosin (CARDURA) 4 mg tablet Take 0.5 Tablets by mouth daily. Indications: enlarged prostate with urination problem      acetaminophen (TYLENOL) 325 mg tablet Take 650 mg by mouth every six (6) hours as needed for Fever or Pain. Indications: fever, pain      acetaminophen (TYLENOL) 650 mg suppository Insert 650 mg into rectum every four (4) hours as needed for Fever or Pain. Indications: fever, pain      eltrombopag (Promacta) 50 mg tablet Take 50 mg by mouth daily.  1 hour before or 2 hours after breakfast  Indications: low platelet count in chronic hepatitis C infection LORazepam (ATIVAN) 0.5 mg tablet Take 1-2 Tablets by mouth every six (6) hours as needed for Agitation or Anxiety (Restlessness). Indications: anxious, difficulty sleeping      eltrombopag (Promacta) 50 mg tablet Take 50 mg by mouth Daily (before breakfast). Lactobacillus Acidoph & Bulgar (FLORANEX) 1 million cell tab tablet Take 2 Tablets by mouth two (2) times a day. 60 Tablet 0    metOLazone (ZAROXOLYN) 10 mg tablet Take 1 Tablet by mouth daily. 30 Tablet 0    torsemide (DEMADEX) 100 mg tablet Take 1 Tablet by mouth two (2) times a day. 60 Tablet 0    morphine (ROXANOL) 20 mg/mL concentrated solution Take 0.25-1 mL by mouth every three (3) hours as needed for Pain or Shortness of Breath. Indications: pain, SOB (Patient not taking: Reported on 12/8/2022)      LORazepam (INTENSOL) 2 mg/mL concentrated solution Take 0.25-1 mL by mouth every four (4) hours as needed for Agitation, Anxiety or Restlessness. Indications: anxious      hyoscyamine SL (LEVSIN/SL) 0.125 mg SL tablet Take 0.125 mg by mouth every four (4) hours as needed for Secretions. Indications: excessive saliva production (Patient not taking: Reported on 12/8/2022)      bisacodyL (DULCOLAX) 10 mg supp Insert 10 mg into rectum daily as needed for Constipation. Indications: constipation      predniSONE (DELTASONE) 20 mg tablet TAKE 4 TABLETS BY MOUTH DAILY. TAKE WITH FOOD TO PREVENT STOMACH UPSET (Patient not taking: Reported on 12/8/2022)      doxazosin (CARDURA) 4 mg tablet Take 1 Tablet by mouth nightly. Indications: enlarged prostate with urination problem, high blood pressure (Patient taking differently: Take 0.5 Tablets by mouth nightly. Indications: enlarged prostate with urination problem, high blood pressure) 20 Tablet 0    magnesium oxide (MAG-OX) 400 mg tablet Take 400 mg by mouth nightly. (Patient not taking: Reported on 12/8/2022)      cholestyramine light (QUESTRAN LITE) 4 gram packet Take 4 g by mouth once.       desloratadine (CLARINEX) 5 mg tablet Take 5 mg by mouth nightly. Indications: ALLERGIC RHINITIS      METOPROLOL SUCCINATE PO Take 25 mg by mouth every evening. ferrous sulfate 325 mg (65 mg iron) tablet Take  by mouth Daily (before breakfast). (Patient not taking: Reported on 2022)         Past History     Past Medical History:  Past Medical History:   Diagnosis Date    Bladder calculus 2015    Chronic kidney disease     H/O kidney stones    Crohn disease ([address]     Foot drop     left    Hypertension     Kidney calculi        Past Surgical History:  Past Surgical History:   Procedure Laterality Date    HX BACK SURGERY      3 back surgery nola and screw    HX CATARACT REMOVAL Bilateral     HX GI      bowel [id number]    HX GI      hemorrhoidectomy    HX UROLOGICAL      kidney stone removal       Family History:  History reviewed. No pertinent family history. Social History:  Social History     Tobacco Use    Smoking status: Former     Types: Cigarettes     Quit date: 2000     Years since quittin.3    Smokeless tobacco: Never   Vaping Use    Vaping Use: Never used   Substance Use Topics    Alcohol use: No    Drug use: No       Allergies:  No Known Allergies      Review of Systems   Review of Systems  Constitutional: Negative for fever, chills, and fatigue. HENT: Negative for congestion, sore throat, rhinorrhea, sneezing and neck stiffness   Eyes: Negative for discharge and redness. Respiratory: Negative for  shortness of breath, wheezing   Cardiovascular: Negative for chest pain, palpitations   Gastrointestinal: Negative for nausea, vomiting, abdominal pain, constipation, diarrhea and blood in stool. Genitourinary: Negative for dysuria, hematuria, flank pain, decreased urine volume, discharge,   Musculoskeletal: Negative for myalgias or joint pain . Skin: Negative for rash or lesions . Neurological: Negative weakness, light-headedness, numbness and headaches.        Physical Exam   Physical Exam    GENERAL: alert no acute distress  EYES: PEERL, No injection, discharge or icterus. ENT: Mucous membranes pink and dry  NECK: Supple  LUNGS: Airway patent. Non-labored respirations. Breath sounds clear with good air entry bilaterally. HEART: Regular rate and rhythm. No peripheral edema  ABDOMEN: Non-distended and non-tender, without guarding or rebound. SKIN:  warm, dry  MSK/EXTREMITIES: Without swelling, tenderness or deformity, symmetric with normal ROM  NEUROLOGICAL: Alert, oriented to person and place, strength is symmetric, cranial nerves II through XII are grossly intact      Diagnostic Study Results     Labs -   No results found for this or any previous visit (from the past 12 hour(s)). Radiologic Studies -   CT HEAD WO CONT   Final Result         1. No acute intracranial abnormality demonstrated. 2. Subcortical and periventricular white matter low-attenuation as can be seen   with sequela of chronic ischemic microvascular change. XR CHEST PORT   Final Result      No acute findings in the chest.         CT Results  (Last 48 hours)      None          CXR Results  (Last 48 hours)      None              Medical Decision Making     I, Erendira Madison MD am the first provider for this patient and am the attending of record for this patient encounter. I reviewed the vital signs, available nursing notes, past medical history, past surgical history, family history and social history. Vital Signs-Reviewed the patient's vital signs. Records Reviewed: Nursing Notes and Old Medical Records    Provider Notes (Medical Decision Making): On presentation, the patient is in no distress, does seem somewhat confused however he is alert he is oriented to person and place, no complaints when asked. His vital signs were notable for some slight hypertension, otherwise reassuring. Differential was broad and included sepsis, stroke, seizure, metabolic abnormality, renal failure, uremia, dehydration among others. Labs were notable for worsening kidney function, likely uremia contributing to his confusion add generalized weakness. Lengthy goals of care discussion with patient's wife and they would like the patient to remain comfort care only at this point I would like to pursue hospice at this time. I consulted with case management and arrangements have been made to start patient on hospice tomorrow. In the interim, the wife would like the patient back at home so transportation arrangements will be made to get patient back to his bed. ED Course:   Initial assessment performed. The patients presenting problems have been discussed, and they are in agreement with the care plan formulated and outlined with them. I have encouraged them to ask questions as they arise throughout their visit. Medications   sodium chloride 0.9 % bolus infusion 500 mL (0 mL IntraVENous IV Completed 12/15/22 1418)   potassium bicarb-citric acid (EFFER-K) tablet 40 mEq (40 mEq Oral Given 12/15/22 1240)         Macario Morris Jr.'s  results have been reviewed with him and his family. They have been counseled regarding his diagnosis. They verbally conveys understanding and agreement with treatment and prognosis. They also agrees with the care-plan and conveys that all of his questions have been answered. I have also put together some discharge instructions for him that include: 1) educational information regarding their diagnosis, 2) how to care for their diagnosis at home, as well a 3) list of reasons why they would want to return to the ED prior to their follow-up appointment, should their condition change. Disposition:  home    PLAN:  1. Discharge Medication List as of 12/15/2022  3:22 PM      Hpspice  2.    Follow-up Information       Follow up With Specialties Details Why [facility] EMERGENCY DEPT Emergency Medicine  As needed [address] [id number]  [phone]          Return to ED if worse     Diagnosis     Clinical Impression:   1. Acute renal failure superimposed on chronic kidney disease, unspecified CKD stage, unspecified acute renal failure type ([facility] [address]    2. Uremia    3. Hypokalemia    4. Physical deconditioning        Please note that this dictation was completed with Dragon, computer voice recognition software. Quite often unanticipated grammatical, syntax, homophones, and other interpretive errors are inadvertently transcribed by the computer software. Please disregard these errors. Additionally, please excuse any errors that have escaped final proofreading.

## 2022-12-20 ENCOUNTER — HOME CARE VISIT (OUTPATIENT)
Dept: HOSPICE | Facility: HOSPICE | Age: 82
End: 2022-12-20
Payer: MEDICARE

## 2022-12-21 ENCOUNTER — HOME CARE VISIT (OUTPATIENT)
Dept: SCHEDULING | Facility: HOME HEALTH | Age: 82
End: 2022-12-21
Payer: MEDICARE

## 2022-12-21 ENCOUNTER — HOME CARE VISIT (OUTPATIENT)
Dept: HOSPICE | Facility: HOSPICE | Age: 82
End: 2022-12-21
Payer: MEDICARE

## 2022-12-21 VITALS
HEART RATE: 84 BPM | TEMPERATURE: 97.7 F | OXYGEN SATURATION: 98 % | SYSTOLIC BLOOD PRESSURE: 113 MMHG | DIASTOLIC BLOOD PRESSURE: 63 MMHG

## 2022-12-21 PROCEDURE — G0299 HHS/HOSPICE OF RN EA 15 MIN: HCPCS

## 2022-12-21 RX ADMIN — MORPHINE SULFATE 0.25 ML: 20 SOLUTION ORAL at 18:10

## 2022-12-21 NOTE — HOSPICE
Patient arouses easily    Patient asks about a condom catheter. Patient then asks about a regular catheter. Wife is leery, she states that sometimes they get issues. Wife states that patient can sometimes stand on the side of the bed other times he can't. Patient denies any pain and discomfort. Patient denies shortness of breath as well. Wife states that patient has slept a fiar amount today. She states that he slept most of the night after taking the anxiety pill. Wife states that she has very few pills for anxiety, she only has like 5-6 pills    Patient makes a face when asked how he is eating. PEr wife he is not eating much. She states that he is eating ice cream, popsicles and cranberry juice. LBM:  Been several days per wife. She said he has had a little bit the last day or two, but not really anything. Patient asks if those little ones count. Writer assured him that they do. Patient clears his throat quite often. His wife states that that is normal.      WIfe states he had some bloody nose over the weekend  She states that he would have intermittent nose bleeds, sometimes they would stop it with ice sometimes they could not. She denies any bloody noses today. Wife sattes that the corners of his mouth are dark, she can wipe it.     if you dont have that alcocer, i won't let you in the door    Reills needed: metolazone    Supplies:  XL diapers, chux, medium gloves, catheter

## 2022-12-21 NOTE — HOSPICE
SW made contact with Shay Shobha, wife of pt to schedule MSW assessment. Mrs. Casey Hoffman was flustered over the phone stating that pt has made a mess and she has a friend coming over to help clean him up. She stated today is just not a good day for a visit and requested that SW call tomorrow and possibly come out for a visit in the afternoon. SW agreed to leave time frame of 1pm on 12/22/22 open to do assessment and promised to call about noon time to make sure the CG is still in agreement with that time. CG is sounding overwhelmed with pt care at this time. SW will follow up with needs during tomorrow's proposed visit.

## 2022-12-22 ENCOUNTER — HOME CARE VISIT (OUTPATIENT)
Dept: SCHEDULING | Facility: HOME HEALTH | Age: 82
End: 2022-12-22
Payer: MEDICARE

## 2022-12-22 PROCEDURE — G0155 HHCP-SVS OF CSW,EA 15 MIN: HCPCS

## 2022-12-22 PROCEDURE — G0156 HHCP-SVS OF AIDE,EA 15 MIN: HCPCS

## 2022-12-22 NOTE — HOSPICE
Visit made as wife called hospice stating that the patient Ruben Mireles been unresponsive all day. His eyes are rolled back in his head and all you can see is the whites of his eyes because his eyes are not closed all the way. He hasn't spoken to me all day today or eaten anything. I can't even get him to sit up. Some neighbors came over to help me clean him up but I don't know if I shoud do it now or wait for you to get here. \" (I advised Pilo Briceno that she could go ahead and clean the patient up or wait for me in our phone conversation prior to my arrival.) Upon arrival to home, Pilo Briceno meets me at the door and states \"maybe I jumped the gun calling you because we cleaned him up and now he is talking to us and is more alert and is doing better. But I just didn't know what to do. \" I assured Pilo Briceno that she did the right thing by calling. Upon entering the room, the patient is resting quietly with eyes closed in no acute distress. He opens his eyes to verbal stimuli and light touch. I asked him how he was doing and he stated \"okay\". Patient denies shortness of breath and shows no s/s of respiratory distress. He also denies pain but several times during my visit he grimaced and jerked as if a sudden pain was hitting him. Even during these episodes, the patient denied pain. I educated Pilo Briceno on s/s to look for such as the facial grimace, clenched fists and guarding that would indicate pain. I also explained to her that if the patient is having pain that he may also being agitated or anxious. I encouraged her to offer him Morphine for the pain and continue with the Lorazepam for anxiety- although she stated that \"I've only been using that at night to help him sleep. \" As our conversation continued, Pilo Briceno said that \"the nurse coming tomorrow was supposed to be bringing a catheter to put in him and that will help with not trying to get him up to go to the bathroom and his diaper being wet. \" I told Pilo Briceno that if she wanted me to, that I could place the catheter tonight while I was there. I also spoke to the patient and he was in agreement to place the catheter during the visit. A 16 French alcocer catheter was placed, using sterile technique without difficulty. Line was patent and draining clear yellow uring. Inital output was approximately 400ml. I demonstrated to Jhony Cruz how to empty the alcocer bag and reminded her to move it with the patient if he was repositioning so that it does not pull against him causing pain. I showed her how to clip it to the chux to prevent pulling. Valencia verbalized understanding of keeping the bag below the patient, but off the floor and to keep any loops out of the tubing so that it would drain properly. Patient tolerated the procedure well. After the alcocer was placed, the patient continued to have several episodes with behavioral indicators that he was experiencing pain. Valencia requested that I show her how to administer the Morphine for pain. I demonstrated how to draw up and administered Morphine 0.25ml to the patient for pain. He was alert and able to swallow it without difficulty. I encouraged Valencia to watch for s/s of pain and ask the patient if he would like medication for pain. I explained that sometimes the patients are more responsive to pain medications being offered than asking directly for the medications. Valencia verbalized undrstanding of the medication administration and had no additional questions about medications. The patient had no additional questions or concerns and was resting quietly upon the completion of my visit. I reminded Jhony Cruz to call the 24-hour number for any additional questions or concerns or for change in patient's condition. She agreed to do so as needed.

## 2022-12-23 ENCOUNTER — HOME CARE VISIT (OUTPATIENT)
Dept: HOME HEALTH SERVICES | Facility: HOME HEALTH | Age: 82
End: 2022-12-23
Payer: MEDICARE

## 2022-12-23 ENCOUNTER — HOME CARE VISIT (OUTPATIENT)
Dept: HOSPICE | Facility: HOSPICE | Age: 82
End: 2022-12-23
Payer: MEDICARE

## 2022-12-23 PROCEDURE — G0155 HHCP-SVS OF CSW,EA 15 MIN: HCPCS

## 2022-12-24 ENCOUNTER — HOME CARE VISIT (OUTPATIENT)
Dept: HOSPICE | Facility: HOSPICE | Age: 82
End: 2022-12-24
Payer: MEDICARE

## 2022-12-24 PROCEDURE — G0299 HHS/HOSPICE OF RN EA 15 MIN: HCPCS

## 2022-12-24 NOTE — HOSPICE
Upon arrival patient resting in bed comfortably, patient tolerated assessment no distress, caregiver present in home, advised caregiver to call 24 hr on call number if needed, reminded caregiver of patients daily visits, questions/concerns answered during visit, will continue to monitor.

## 2022-12-25 ENCOUNTER — HOME CARE VISIT (OUTPATIENT)
Dept: HOSPICE | Facility: HOSPICE | Age: 82
End: 2022-12-25
Payer: MEDICARE

## 2022-12-25 PROCEDURE — G0299 HHS/HOSPICE OF RN EA 15 MIN: HCPCS

## 2022-12-26 ENCOUNTER — HOME CARE VISIT (OUTPATIENT)
Dept: HOSPICE | Facility: HOSPICE | Age: 82
End: 2022-12-26
Payer: MEDICARE

## 2022-12-26 VITALS
SYSTOLIC BLOOD PRESSURE: 115 MMHG | TEMPERATURE: 97.9 F | DIASTOLIC BLOOD PRESSURE: 58 MMHG | HEART RATE: 99 BPM | RESPIRATION RATE: 18 BRPM | OXYGEN SATURATION: 90 %

## 2022-12-26 PROCEDURE — G0299 HHS/HOSPICE OF RN EA 15 MIN: HCPCS

## 2022-12-26 NOTE — CASE COMMUNICATION
OT Discharge: Pt has been seen by home health occupational therapy services to address ADL, balance, functional mobility, functional transfers and BUE strengthening. Pt required minimal assistance with performing functional sit to stand transfers, with instructed/education on proper hand placement, safety sequence and overall technique, with pt requiring verbal cues for redirection and re-orientation. Pt demonstrated decreased safety aw areness when descending into seat, noted with rapid descendents, requiring max verbal cues for safety, however, demonstrated limited carryover. . Pt was instructed/education on trialing tub bench shower, however, tub bench did not obtain prior to discharge. Pt was progress with BUE strengthening exercise. Pt making slow progress with engaging in functional standing level task, tolerating x 2 minutes in standing with support of rolling wa lker, demonstrating fair balance. Pt presented with alterted mental status on last OT visit (12/14/22), demonstrating difficulty with following 1 step commands and with increased confusion. Pt has been seen for 3 OT visits by HALLIE/L, prior to discharge from skilled occupational therapy services.  Pt will transition to hospice per pt and family request.

## 2022-12-26 NOTE — HOSPICE
The following standard precautions for infection control were utilized: Face Mask, Safety Glasses and Gloves. Care provided per established plan of care:  Yes. Patient is without complaints during care provided. Patient requests: N/A    Family/Caregiver requests:  N/A    Communicated to , Clinical Manager, or Director regarding patient/family/caregiver/aide findings:  Communicated with DEENA Mullins. Status of patient upon end of hospice aide visit:  Patient laying in his hospital bed positioned on his right side with his eyes closed. Patient appears to be comfortable.

## 2022-12-26 NOTE — HOME HEALTH
OT Discharge: Pt has been seen by home health occupational therapy services to address ADL, balance, functional mobility, functional transfers and BUE strengthening. Pt required minimal assistance with performing functional sit to stand transfers, with instructed/education on proper hand placement, safety sequence and overall technique, with pt requiring verbal cues for redirection and re-orientation. Pt demonstrated decreased safety awareness when descending into seat, noted with rapid descendents, requiring max verbal cues for safety, however, demonstrated limited carryover. . Pt was instructed/education on trialing tub bench shower, however, tub bench did not obtain prior to discharge. Pt was progress with BUE strengthening exercise. Pt making slow progress with engaging in functional standing level task, tolerating x 2 minutes in standing with support of rolling walker, demonstrating fair balance. Pt presented with alterted mental status on last OT visit (12/14/22), demonstrating difficulty with following 1 step commands and with increased confusion. Pt has been seen for 3 OT visits by HALLIE/L, prior to discharge from skilled occupational therapy services.  Pt will transition to hospice per pt and family request.

## 2022-12-26 NOTE — HOSPICE
Medication refills ordered this visit: none    Medications reconciled and all medications are available in the home this visit. The following education was provided regarding medications, medication interactions, and look alike medications. Response to teaching: appropriate. Medications are effective at this time. Supplies by type and quantity ordered this visit include: NA    Consulted medical director/attending physician regarding: NA    Instructed patient/family/caregiver on 24-hour hospice availability and phone number. Plan for next visit:  daily visits for end of life symptom management    Pt non responsive throughout visit. No signs of pain or discomfort noted. Emotional support provided to spouse, who was teary at times.

## 2022-12-27 ENCOUNTER — HOME CARE VISIT (OUTPATIENT)
Dept: HOSPICE | Facility: HOSPICE | Age: 82
End: 2022-12-27
Payer: MEDICARE

## 2022-12-27 VITALS — RESPIRATION RATE: 12 BRPM | HEART RATE: 88 BPM | TEMPERATURE: 98.2 F | OXYGEN SATURATION: 92 %

## 2022-12-27 PROCEDURE — G0299 HHS/HOSPICE OF RN EA 15 MIN: HCPCS

## 2022-12-27 NOTE — HOSPICE
Patient was received in hospital bed in home in no obvious painful distress. No signs of discomfort observed. Lung sounds diminished. non verbal. abdominal breathing evident, patient calm and relaxed based on observation    No medication refil needed   Medications reconciled and all medications are available in the home this visit. The following education was provided regarding medications, medication interactions, and look a like medications:  Response to teaching: recep. Medications  are effective at this time. Supplies by type and quantity ordered/delivered this visit include: none needed    Consulted attending physician regarding: no consult required during visit.

## 2022-12-27 NOTE — HOSPICE
Pronouncement of death completed by: FERNANDEZ Ybarra, RN and Bertin Augustin, DEENA. Patient pronounced at 5431. .  Agency staff was not present at the time of death. At the time of death the patient was documented as [facility] death pronouncement apneic, pulseless, blood pressure absent. The pt  within his home. The following were notified of the patient's death, patient's wife. Medications were disposed of per [facility] protocol.    28mL Of Morphine  30mL of Ativan  (6) 0.5mg tablets Lorazepam    Family educated about turning medicaitons in to local pharmacy for proper disposal of daily medications. Postmortem care provided to patient. Family did not actively and willingly participated in care. Patient and families postmortem Nondenominational and cultural wishes maintained. The following patient belongings left in the home with his spouse. The following were removed from patient body, None. Eyes gently closed. HOB raised to 30 degrees. Encouraged family/caregiver to spend time with  patient. Provided emotional support. This clinician didt stay with family as well as the  until  home arrived. Familyis appropriately/inappropriately coping/grieving currently. Extra support requested for spouse. [facility] home picked up remains.

## 2022-12-28 PROCEDURE — 0651 HSPC ROUTINE HOME CARE

## 2022-12-28 NOTE — HOSPICE
The following standard precautions for infection control were utilized:  hand . The purpose of today's visit was to complete an initial psychosocial assessment for Darío Saavedra. Darrell Hernández is a 78years old male with an admitting diagnosis of Chronic kidney disease. SW was received at the front door of the single story home by wife and primary cg, Nate Castro. Nate Castro welcomed SW into the home. At the time of the assessment, LITTLE was providing ADL care to the pt. Pt was unaware of care, unable to communicate, and appeared to be transitioning at the time of visit. CG stated that pt has communicated to her since last evening. CG stated yesterday afternoon a neighborhood friend visited with pt and at that time he was awake and communicating. CG states that his transition has been very quick, only being diagnosed around Thanksgiving time. SW provided emotional support as CG discussed pt's condition, history, and their life together. PT/CG have been  for over 60 years, they have no children. CG stated they have lived in the home for about 50 years and their neighbors have become their family. CG reports great support through extended family as well. CG is exhausted with caregiving but has reached out to neighbors to help with ADL care when necessary. LITTLE was able to provide education on ADL care during the visit. CG stated education needs concerning the medication regimen for pt. SW stated she would request RN CM call to discuss comfort meds. SW also believes that pt is transitioning and will request increased RN visits for support and EOL education. CG stated pt has not eaten or drank anything since approximately 2 days ago. SW provided EOL education on nutritional needs. SW also educated on mouth care. CG denied having mouth swabs and SW agreed to bring some by tomorrow. SW went over EOL signs and symptoms with provide hospice material as educational tool. CG voiced understanding.  Emily Lipoma will add PRN visits for additional support as needed. CG agrees to SW POC.

## 2022-12-28 NOTE — HOSPICE
CHRISTIANO was contacted by office staff requesting bereavement support for wife of pt. CHRISTIANO was received at the door by Raffy Denny, wife of pt. CHRISTIANO sat with Raffy Denny while DEENA THOMPSON prepared the body for  home removal.  DELFIN/BC provided emotional support, empathic listening, and education on  home removal and arrangements. Raffy Denny was visibly sad and able to express her emotions openly with CHRISTIANO. She stated gratitude for the hospice team.  CHRISTIANO remained in the home for guidance and support through the removal of body. DELFIN also sat after  home  with the family as they discussed pt's life. CHRISTIANO asked wife if there would be someone available to stay with her in the home tonight and she stated her sister would be there for support and next door neighbor is available whenever she calls also. CHRISTIANO will follow up in about 3 weeks to begin bereavement visits/support.

## 2022-12-28 NOTE — HOSPICE
The following standard precautions for infection control were utilized:  hand . SW made PRN visit to offer emotional support to cg and delivery mouth swabs for pt care. SW was greeted at the door by Jhony Cruz, pt's spouse. SW was welcomed in.  SW noted that there is no change in pt's condition, he still appears to be transitioning. No meaningful conversation, no movement for 2 days now. CG is visibly upset and having a difficult time. She stated to SW that she just doesn't want him to die on [place] day. SW and wife discussed mouth care and SW provided education on use of mouth swabs. SW again educated on EOL signs and symptoms. Jhony Cruz stated she would have vistors and caregivers (family members) over the weekend to help and provide support. DELFIN stated RN CM would become daily due to pt's condition and she welcomed the idea of an RN visit daily. No other needs at this time.

## 2022-12-29 PROCEDURE — 0651 HSPC ROUTINE HOME CARE

## 2022-12-30 PROCEDURE — 0651 HSPC ROUTINE HOME CARE

## 2022-12-31 PROCEDURE — 0651 HSPC ROUTINE HOME CARE

## 2023-01-01 PROCEDURE — 0651 HSPC ROUTINE HOME CARE

## 2023-01-02 PROCEDURE — 0651 HSPC ROUTINE HOME CARE

## 2023-01-02 NOTE — HOSPICE
Hospice Admission Summary  Maritza Dobson is an 78 y.o. male admitted to hospice services with a terminal diagnosis of chronic kidney disease. Patient has elected hospice services and is no longer seeking aggressive treatment. Co-morbidities related to the terminal diagnosis are Debility, Crohn's disease, and Aortic valve stenosis. Patient also has a past medical history of hypertension. Mr. Sage Galloway presented to the emergency room with complaints of increasing lower extremity edema and shortness of breath despite adjusting his diuretic regimen at home. In the emergency room he was found to be in acute kidney failure with increasing creatinine to 5 from a baseline of 2.5. He also had some mild hypokalemia with troponin elevation. 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 A paracentesis was done, and a renal biopsy was performed. 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 He is not good candidate for surgery or dialysis. He was recently hospitalized with metabolic encephalopathy from UTI a month prior to this hospitalization. He has not been able to have his aortic valve surgery due to low platelets. Patient's wife is present, willing, and safely able to provide care and administer medications. She is available 24 hours a day with good family and community support. The patient and his wife participated in goal setting, care planning, and are agreeable to the care plan. Admission booklet reviewed with patient and his wife; services provided under hospice benefit, review of rights and responsibilities, disposal of medications, contact information for , Joint Commission, Medicare, O, and outside resources for independence.   The patient and his wife were educated on IDT and their right to attend meetings. Education provided regarding 24-hour availability of hospice services and on-call number provided. The patient and his wife verbalized understanding.       Attending physician: Dr. Rosaura Man Director:  Dr. Carolina Douglas  Level of Care:  Routine  Advance Directives: POST  :  [employer]  Allergies:  No Known Drug Allergies  LMAC:  23.5 cm  PPS:  30%

## (undated) DEVICE — SINGLE PORT MANIFOLD: Brand: NEPTUNE 2

## (undated) DEVICE — TRAP SPEC COLL POLYP POLYSTYR --

## (undated) DEVICE — TRAY PREP DRY W/ PREM GLV 2 APPL 6 SPNG 2 UNDPD 1 OVERWRAP

## (undated) DEVICE — SOLUTION IRRIG 3000ML 0.9% SOD CHL FLX CONT 0797208] ICU MEDICAL INC]

## (undated) DEVICE — CATH URETH FOL 2W SH 22FRX5ML -- CONVERT TO ITEM 363075

## (undated) DEVICE — SOL IRR STRL H2O 1500ML BTL --

## (undated) DEVICE — KENDALL RADIOLUCENT FOAM MONITORING ELECTRODE RECTANGULAR SHAPE: Brand: KENDALL

## (undated) DEVICE — MOUTHPIECE ENDOSCP 20X27MM --

## (undated) DEVICE — UTILITY MARKER,BLACK WITH LABELS: Brand: DEVON

## (undated) DEVICE — SPONGE GZ W4XL4IN COT 12 PLY TYP VII WVN C FLD DSGN

## (undated) DEVICE — ENDO CARRY-ON PROCEDURE KIT INCLUDES ENZYMATIC SPONGE, GAUZE, BIOHAZARD LABEL, TRAY, LUBRICANT, DIRTY SCOPE LABEL, WATER LABEL, TRAY, DRAWSTRING PAD, AND DEFENDO 4-PIECE KIT.: Brand: ENDO CARRY-ON PROCEDURE KIT

## (undated) DEVICE — SOL IRR STRL H2O 3000ML BG -- USE ITEM 173640

## (undated) DEVICE — PACK,CYSTOSCOPY,PK III,AURORA: Brand: MEDLINE

## (undated) DEVICE — DEVON™ KNEE AND BODY STRAP 60" X 3" (1.5 M X 7.6 CM): Brand: DEVON

## (undated) DEVICE — MAJ-1414 SINGLE USE ADPATER BIOPSY VALV: Brand: SINGLE USE ADAPTOR BIOPSY VALVE

## (undated) DEVICE — SET ADMIN IV PMP 20GTT 117IN -- 2 NDLS INJ SITE

## (undated) DEVICE — TUBING SUCT L12FT DIA0.25IN CLR W/ MAXI-GRIP AND M/M CONN

## (undated) DEVICE — BAG URIN LEG DISPOZ-A-BG 19OZ -- W/18IN EXT TUBING

## (undated) DEVICE — STERILE POLYISOPRENE POWDER-FREE SURGICAL GLOVES: Brand: PROTEXIS

## (undated) DEVICE — BASIC SINGLE BASIN 1-LF: Brand: MEDLINE INDUSTRIES, INC.

## (undated) DEVICE — (D)SYR 10ML 1/5ML GRAD NSAF -- PKGING CHANGE USE ITEM 338027

## (undated) DEVICE — TOWEL SURG W16XL26IN BLU NONFENESTRATED DLX ST 2 PER PK